# Patient Record
Sex: MALE | Race: WHITE | NOT HISPANIC OR LATINO | URBAN - METROPOLITAN AREA
[De-identification: names, ages, dates, MRNs, and addresses within clinical notes are randomized per-mention and may not be internally consistent; named-entity substitution may affect disease eponyms.]

---

## 2019-11-14 ENCOUNTER — INPATIENT (INPATIENT)
Facility: HOSPITAL | Age: 54
LOS: 6 days | Discharge: NURSING, REHAB OR LONG-TERM CARE | DRG: 66 | End: 2019-11-21
Attending: HOSPITALIST | Admitting: HOSPITALIST
Payer: COMMERCIAL

## 2019-11-14 VITALS
TEMPERATURE: 98 F | RESPIRATION RATE: 16 BRPM | WEIGHT: 184.97 LBS | HEART RATE: 88 BPM | OXYGEN SATURATION: 100 % | SYSTOLIC BLOOD PRESSURE: 158 MMHG | HEIGHT: 66 IN | DIASTOLIC BLOOD PRESSURE: 102 MMHG

## 2019-11-14 DIAGNOSIS — R29.90 UNSPECIFIED SYMPTOMS AND SIGNS INVOLVING THE NERVOUS SYSTEM: ICD-10-CM

## 2019-11-14 DIAGNOSIS — I73.9 PERIPHERAL VASCULAR DISEASE, UNSPECIFIED: ICD-10-CM

## 2019-11-14 DIAGNOSIS — I10 ESSENTIAL (PRIMARY) HYPERTENSION: ICD-10-CM

## 2019-11-14 DIAGNOSIS — Z29.9 ENCOUNTER FOR PROPHYLACTIC MEASURES, UNSPECIFIED: ICD-10-CM

## 2019-11-14 DIAGNOSIS — H66.90 OTITIS MEDIA, UNSPECIFIED, UNSPECIFIED EAR: ICD-10-CM

## 2019-11-14 LAB
ALBUMIN SERPL ELPH-MCNC: 3.5 G/DL — SIGNIFICANT CHANGE UP (ref 3.3–5)
ALP SERPL-CCNC: 63 U/L — SIGNIFICANT CHANGE UP (ref 40–120)
ALT FLD-CCNC: 43 U/L — SIGNIFICANT CHANGE UP (ref 12–78)
ANION GAP SERPL CALC-SCNC: 9 MMOL/L — SIGNIFICANT CHANGE UP (ref 5–17)
AST SERPL-CCNC: 25 U/L — SIGNIFICANT CHANGE UP (ref 15–37)
BASOPHILS # BLD AUTO: 0.02 K/UL — SIGNIFICANT CHANGE UP (ref 0–0.2)
BASOPHILS NFR BLD AUTO: 0.3 % — SIGNIFICANT CHANGE UP (ref 0–2)
BILIRUB SERPL-MCNC: 0.8 MG/DL — SIGNIFICANT CHANGE UP (ref 0.2–1.2)
BUN SERPL-MCNC: 23 MG/DL — SIGNIFICANT CHANGE UP (ref 7–23)
CALCIUM SERPL-MCNC: 8.8 MG/DL — SIGNIFICANT CHANGE UP (ref 8.5–10.1)
CHLORIDE SERPL-SCNC: 108 MMOL/L — SIGNIFICANT CHANGE UP (ref 96–108)
CO2 SERPL-SCNC: 23 MMOL/L — SIGNIFICANT CHANGE UP (ref 22–31)
CREAT SERPL-MCNC: 1.3 MG/DL — SIGNIFICANT CHANGE UP (ref 0.5–1.3)
EOSINOPHIL # BLD AUTO: 0 K/UL — SIGNIFICANT CHANGE UP (ref 0–0.5)
EOSINOPHIL NFR BLD AUTO: 0 % — SIGNIFICANT CHANGE UP (ref 0–6)
GLUCOSE SERPL-MCNC: 267 MG/DL — HIGH (ref 70–99)
HCT VFR BLD CALC: 51.6 % — HIGH (ref 39–50)
HGB BLD-MCNC: 18.4 G/DL — HIGH (ref 13–17)
IMM GRANULOCYTES NFR BLD AUTO: 0.1 % — SIGNIFICANT CHANGE UP (ref 0–1.5)
LIDOCAIN IGE QN: 225 U/L — SIGNIFICANT CHANGE UP (ref 73–393)
LYMPHOCYTES # BLD AUTO: 0.74 K/UL — LOW (ref 1–3.3)
LYMPHOCYTES # BLD AUTO: 9.8 % — LOW (ref 13–44)
MCHC RBC-ENTMCNC: 32.7 PG — SIGNIFICANT CHANGE UP (ref 27–34)
MCHC RBC-ENTMCNC: 35.7 GM/DL — SIGNIFICANT CHANGE UP (ref 32–36)
MCV RBC AUTO: 91.7 FL — SIGNIFICANT CHANGE UP (ref 80–100)
MONOCYTES # BLD AUTO: 0.27 K/UL — SIGNIFICANT CHANGE UP (ref 0–0.9)
MONOCYTES NFR BLD AUTO: 3.6 % — SIGNIFICANT CHANGE UP (ref 2–14)
NEUTROPHILS # BLD AUTO: 6.54 K/UL — SIGNIFICANT CHANGE UP (ref 1.8–7.4)
NEUTROPHILS NFR BLD AUTO: 86.2 % — HIGH (ref 43–77)
NRBC # BLD: 0 /100 WBCS — SIGNIFICANT CHANGE UP (ref 0–0)
PLATELET # BLD AUTO: 214 K/UL — SIGNIFICANT CHANGE UP (ref 150–400)
POTASSIUM SERPL-MCNC: 3.6 MMOL/L — SIGNIFICANT CHANGE UP (ref 3.5–5.3)
POTASSIUM SERPL-SCNC: 3.6 MMOL/L — SIGNIFICANT CHANGE UP (ref 3.5–5.3)
PROT SERPL-MCNC: 7.7 G/DL — SIGNIFICANT CHANGE UP (ref 6–8.3)
RBC # BLD: 5.63 M/UL — SIGNIFICANT CHANGE UP (ref 4.2–5.8)
RBC # FLD: 12 % — SIGNIFICANT CHANGE UP (ref 10.3–14.5)
SODIUM SERPL-SCNC: 140 MMOL/L — SIGNIFICANT CHANGE UP (ref 135–145)
WBC # BLD: 7.58 K/UL — SIGNIFICANT CHANGE UP (ref 3.8–10.5)
WBC # FLD AUTO: 7.58 K/UL — SIGNIFICANT CHANGE UP (ref 3.8–10.5)

## 2019-11-14 PROCEDURE — 70496 CT ANGIOGRAPHY HEAD: CPT | Mod: 26

## 2019-11-14 PROCEDURE — 70498 CT ANGIOGRAPHY NECK: CPT | Mod: 26

## 2019-11-14 PROCEDURE — 99285 EMERGENCY DEPT VISIT HI MDM: CPT

## 2019-11-14 PROCEDURE — 93010 ELECTROCARDIOGRAM REPORT: CPT

## 2019-11-14 PROCEDURE — 99223 1ST HOSP IP/OBS HIGH 75: CPT | Mod: GC

## 2019-11-14 RX ORDER — DIAZEPAM 5 MG
5 TABLET ORAL ONCE
Refills: 0 | Status: DISCONTINUED | OUTPATIENT
Start: 2019-11-14 | End: 2019-11-14

## 2019-11-14 RX ORDER — ASPIRIN/CALCIUM CARB/MAGNESIUM 324 MG
325 TABLET ORAL ONCE
Refills: 0 | Status: COMPLETED | OUTPATIENT
Start: 2019-11-14 | End: 2019-11-14

## 2019-11-14 RX ORDER — MECLIZINE HCL 12.5 MG
25 TABLET ORAL ONCE
Refills: 0 | Status: COMPLETED | OUTPATIENT
Start: 2019-11-14 | End: 2019-11-14

## 2019-11-14 RX ORDER — SODIUM CHLORIDE 9 MG/ML
1000 INJECTION INTRAMUSCULAR; INTRAVENOUS; SUBCUTANEOUS ONCE
Refills: 0 | Status: COMPLETED | OUTPATIENT
Start: 2019-11-14 | End: 2019-11-14

## 2019-11-14 RX ORDER — SODIUM CHLORIDE 9 MG/ML
3 INJECTION INTRAMUSCULAR; INTRAVENOUS; SUBCUTANEOUS ONCE
Refills: 0 | Status: COMPLETED | OUTPATIENT
Start: 2019-11-14 | End: 2019-11-14

## 2019-11-14 RX ORDER — ASPIRIN/CALCIUM CARB/MAGNESIUM 324 MG
325 TABLET ORAL DAILY
Refills: 0 | Status: DISCONTINUED | OUTPATIENT
Start: 2019-11-14 | End: 2019-11-21

## 2019-11-14 RX ORDER — SODIUM CHLORIDE 9 MG/ML
1000 INJECTION INTRAMUSCULAR; INTRAVENOUS; SUBCUTANEOUS
Refills: 0 | Status: DISCONTINUED | OUTPATIENT
Start: 2019-11-14 | End: 2019-11-21

## 2019-11-14 RX ADMIN — Medication 325 MILLIGRAM(S): at 23:02

## 2019-11-14 RX ADMIN — SODIUM CHLORIDE 3 MILLILITER(S): 9 INJECTION INTRAMUSCULAR; INTRAVENOUS; SUBCUTANEOUS at 17:59

## 2019-11-14 RX ADMIN — Medication 25 MILLIGRAM(S): at 17:58

## 2019-11-14 RX ADMIN — Medication 125 MILLIGRAM(S): at 17:58

## 2019-11-14 RX ADMIN — SODIUM CHLORIDE 75 MILLILITER(S): 9 INJECTION INTRAMUSCULAR; INTRAVENOUS; SUBCUTANEOUS at 23:41

## 2019-11-14 RX ADMIN — SODIUM CHLORIDE 1000 MILLILITER(S): 9 INJECTION INTRAMUSCULAR; INTRAVENOUS; SUBCUTANEOUS at 17:59

## 2019-11-14 RX ADMIN — Medication 1 TABLET(S): at 23:02

## 2019-11-14 RX ADMIN — Medication 5 MILLIGRAM(S): at 17:58

## 2019-11-14 NOTE — ED PROVIDER NOTE - CLINICAL SUMMARY MEDICAL DECISION MAKING FREE TEXT BOX
Pt is a 55 yo male who presents to the ED with a cc of dizziness.  PMHx of HTN.  Pt reports that symptoms began on Monday.  He reports that he is experiencing right ear pressure/fullness.  He further reports that he feels dizzy like the room is spinning and when he stands he feels like his gait is unstable.  He states that yesterday he developed nausea with several episodes of vomiting since.  Denies fever, chills, D/C, CP, SOB, abd pain.  Denies ext numbness or weakness.  Pt reports one prior similar episode but he reports that this was self limited and resolved within a short period of time. He was seen at urgent care today and was given one dose of Meclizine.  Symptoms did not improve and so pt was sent to the ED for further work up. Concern for BPV vs CVA.  Will obtain screening labs, CT head and medicate for symptoms.  Pt is not a candidate for TPA as symptoms have been ongoing for days. Dysphagia screen passed at bedside

## 2019-11-14 NOTE — H&P ADULT - NSHPSOCIALHISTORY_GEN_ALL_CORE
Occupation:  at [employer]   Lives with: 2 roommates, rents an apartment    Ambulates without assistance, able to use stairs (prior to episode of dizziness)  Does not have a car - walks to destinations     Substance Use (street drugs): denies  Tobacco Usage:  denies  Alcohol Usage: denies

## 2019-11-14 NOTE — H&P ADULT - ASSESSMENT
54M with PMH of HTN presents with dizziness for 4 days admitted for stroke-like symptoms and vertebral artery stenosis/occlusion.

## 2019-11-14 NOTE — H&P ADULT - NSHPPHYSICALEXAM_GEN_ALL_CORE
Height (cm): 167.64 (11-14 @ 16:46)  Weight (kg): 83.9 (11-14 @ 16:46)  BMI (kg/m2): 29.9 (11-14 @ 16:46)  BSA (m2): 1.93 (11-14 @ 16:46)  Vital Signs Last 24 Hrs  T(C): 36.6 (14 Nov 2019 21:10), Max: 36.7 (14 Nov 2019 16:46)  T(F): 97.8 (14 Nov 2019 21:10), Max: 98.1 (14 Nov 2019 16:46)  HR: 105 (14 Nov 2019 23:44) (83 - 105)  BP: 142/94 (14 Nov 2019 23:44) (142/94 - 158/102)  BP(mean): --  RR: 16 (14 Nov 2019 23:44) (15 - 16)  SpO2: 96% (14 Nov 2019 23:44) (96% - 100%)  [ X] room air   [ ] 02    PHYSICAL EXAM:  GENERAL:  No acute distress  HEAD:  AT/NC  ENMT: EOMI, PERRL, dry mucus membranes, vertical and horizontal bilateral nystagmus noted; ?left upper eyelid droop   NECK:  supple  NERVOUS SYSTEM:  Alert & Oriented X3, CN II-VII grossly intact, vertical and horizontal bilateral nystagmus noted  CHEST/LUNG: Clear to auscultation bilaterally  HEART:  Regular rate and rhythm, No murmurs, rubs, or gallops  ABDOMEN:  soft, nontender, nondistended, positive bowel sounds  EXTREMITIES: No clubbing, cyanosis or edema  SKIN: no venous stasis skin changes Height (cm): 167.64 (11-14 @ 16:46)  Weight (kg): 83.9 (11-14 @ 16:46)  BMI (kg/m2): 29.9 (11-14 @ 16:46)  BSA (m2): 1.93 (11-14 @ 16:46)  Vital Signs Last 24 Hrs  T(C): 36.6 (14 Nov 2019 21:10), Max: 36.7 (14 Nov 2019 16:46)  T(F): 97.8 (14 Nov 2019 21:10), Max: 98.1 (14 Nov 2019 16:46)  HR: 105 (14 Nov 2019 23:44) (83 - 105)  BP: 142/94 (14 Nov 2019 23:44) (142/94 - 158/102)  BP(mean): --  RR: 16 (14 Nov 2019 23:44) (15 - 16)  SpO2: 96% (14 Nov 2019 23:44) (96% - 100%)  [ X] room air   [ ] 02    PHYSICAL EXAM:  GENERAL:  No acute distress  HEAD:  AT/NC  ENMT: EOMI, PERRL, dry mucus membranes, vertical and horizontal bilateral nystagmus noted; ?left upper eyelid droop, R tympanic membrane bulging and red   NECK:  supple  NERVOUS SYSTEM:  Alert & Oriented X3, CN II-VII grossly intact, vertical and horizontal bilateral nystagmus noted  CHEST/LUNG: Clear to auscultation bilaterally  HEART:  Regular rate and rhythm, No murmurs, rubs, or gallops  ABDOMEN:  soft, nontender, nondistended, positive bowel sounds  EXTREMITIES: No clubbing, cyanosis or edema  SKIN: no venous stasis skin changes Height (cm): 167.64 (11-14 @ 16:46)  Weight (kg): 83.9 (11-14 @ 16:46)  BMI (kg/m2): 29.9 (11-14 @ 16:46)  BSA (m2): 1.93 (11-14 @ 16:46)  Vital Signs Last 24 Hrs  T(C): 36.6 (14 Nov 2019 21:10), Max: 36.7 (14 Nov 2019 16:46)  T(F): 97.8 (14 Nov 2019 21:10), Max: 98.1 (14 Nov 2019 16:46)  HR: 105 (14 Nov 2019 23:44) (83 - 105)  BP: 142/94 (14 Nov 2019 23:44) (142/94 - 158/102)  BP(mean): --  RR: 16 (14 Nov 2019 23:44) (15 - 16)  SpO2: 96% (14 Nov 2019 23:44) (96% - 100%)  [ X] room air   [ ] 02    PHYSICAL EXAM:  GENERAL:  No acute distress  HEAD:  AT/NC  ENMT: EOMI, PERRL, dry mucus membranes, vertical and horizontal bilateral nystagmus noted; ?left upper eyelid droop, R tympanic membrane bulging and red   NECK:  supple  NERVOUS SYSTEM:  Alert & Oriented X3, CN II-VII grossly intact, vertical and horizontal bilateral nystagmus noted.    CHEST/LUNG: Clear to auscultation bilaterally  HEART:  Regular rate and rhythm, No murmurs, rubs, or gallops  ABDOMEN:  soft, nontender, nondistended, positive bowel sounds  EXTREMITIES: No clubbing, cyanosis or edema  SKIN: no venous stasis skin changes

## 2019-11-14 NOTE — ED ADULT TRIAGE NOTE - CHIEF COMPLAINT QUOTE
dizziness for the past three days went to urgent care and given a pill one hour ago and dizziness did not go away per urgent care sheet pts gait is unbalanced

## 2019-11-14 NOTE — ED ADULT NURSE REASSESSMENT NOTE - NS ED NURSE REASSESS COMMENT FT1
pt evaluated at bedside by Dr Servin IV inserted blood work drawn and sent to lab as ordered.  IVF initiated medicated as ordered CT scan complete.  awaiting results and dispo. ambulated to bathroom with minimal assistance.

## 2019-11-14 NOTE — H&P ADULT - PROBLEM SELECTOR PLAN 2
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  -management as above

## 2019-11-14 NOTE — ED ADULT NURSE NOTE - CHPI ED NUR SYMPTOMS NEG
no vomiting/no fever/no weakness/no numbness/no blurred vision/no change in level of consciousness/no confusion/no nausea/no loss of consciousness

## 2019-11-14 NOTE — H&P ADULT - PROBLEM SELECTOR PLAN 1
Patient with persistent dizziness for 4days. Out of tPA window  -Admit to tele  -Start IVF 75cc/hr x 12 hours. H/H mildly elevated likely due to hemoconcentration as patient reports vomiting and minimal intake today  -Start ASA 325mg daily  -Follow up MRI head, MRA head and neck  -Follow up hemoglobin A1C, TSH, lipid panel  -Speech and swallow evaluation

## 2019-11-14 NOTE — H&P ADULT - PROBLEM SELECTOR PLAN 3
chronic, stable  -Allow for permissive hypertension, hold home medications metoprolol and amlodipine Patient admits to R ear fullness. R tympanic membrane bulging and red   -s/p 1 dose of Augmentin in the ED. Continue Augmentin BID x 7 days

## 2019-11-14 NOTE — H&P ADULT - PROBLEM SELECTOR PLAN 5
IMPROVE VTE Individual Risk Assessment          RISK                                                          Points  [  ] Previous VTE                                                3  [  ] Thrombophilia                                             2  [  ] Lower limb paralysis                                   2        (unable to hold up >15 seconds)    [  ] Current Cancer                                             2         (within 6 months)  [  ] Immobilization > 24 hrs                              1  [  ] ICU/CCU stay > 24 hours                             1  [  ] Age > 60                                                         1    IMPROVE VTE Score: 0  DVT prophylaxis: SCDs  Fall risk

## 2019-11-14 NOTE — ED PROVIDER NOTE - OBJECTIVE STATEMENT
Pt is a 55 yo male who presents to the ED with a cc of dizziness.  PMHx of HTN.  Pt reports that symptoms began on Monday.  He reports that he is experiencing right ear pressure/fullness.  He further reports that he feels dizzy like the room is spinning and when he stands he feels like his gait is unstable.  He states that yesterday he developed nausea with several episodes of vomiting since.  Denies fever, chills, D/C, CP, SOB, abd pain.  Denies ext numbness or weakness.  Pt reports one prior similar episode but he reports that this was self limited and resolved within a short period of time. He was seen at urgent care today and was given one dose of Meclizine.  Symptoms did not improve and so pt was sent to the ED for further work up.

## 2019-11-14 NOTE — ED ADULT NURSE NOTE - OBJECTIVE STATEMENT
pt c/o feeling dizzy was sent from urgent care for persistent dizziness and "unsteady gait" pt c/o feeling dizzy was sent from urgent care for persistent dizziness and "unsteady gait"  left eyelid is "droopy" when asked pt is unsure if he always has a droopy left lid.

## 2019-11-14 NOTE — H&P ADULT - HISTORY OF PRESENT ILLNESS
54M with PMH of HTN presents with dizziness for 4 days. States he feels like the room is spinning and has an unsteady gait.  Admits to right ear pressure. Patient went to Urgent Care today and was given Meclizine.     In the ED: Temp 98.1, HR 88, /102, RR 16, SpO2 100% RA.   H/H: 18.4/51.6, glucose 267.   CT head: No acute hemorrhage or mass effect.  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  NIH Stroke Scale: 0, EKG: NSR 94 54M with PMH of HTN presents with dizziness for 4 days. States he feels like the room is spinning and has an unsteady gait. Reports intermittent dizziness for a 2 days but progressed to constant dizziness, he was unable to stand without holding unto objects near by. Patient walks to the [facility] for work daily and couldn't walk without reaching and leaning on trees. States similar brief episode of dizziness last week but was able to go to work and symptoms resolved after a minute. Patient works as a  in [employer], skins and cuts fish, also does heavy loading. States diet consists of "eat on the go take out," including [employer], bagels, soda, ice tea, deli food and chinese food. Admits to two episodes of non-bloody, non-bilious emesis and was unable to keep food down today, prompting urgent care evaluation. Patient was given Meclizine at the urgent care and was advised ED eval if symptoms persisted. Denies chest pain, palpitations, SOB, FRENCH, abd pain, c/d. Denies ear pain, sore throat. States his form of exercise is walking, as patient does not have a car.     In the ED: Temp 98.1, HR 88, /102, RR 16, SpO2 100% RA.   H/H: 18.4/51.6, glucose 267.   CT head: No acute hemorrhage or mass effect.  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  NIH Stroke Scale: 0, EKG: NSR 94  Received ASA 325mg, Augmentin x1, 1L NS bolus, Valium 5mg x1, Meclizine 25mg x1, Solu-Medrol 125mg IV x1.

## 2019-11-14 NOTE — H&P ADULT - NSHPREVIEWOFSYSTEMS_GEN_ALL_CORE
CONSTITUTIONAL: No fever, No chills  EYES: No eye pain, visual disturbances, or discharge  ENMT:  No ear pain; No sinus or throat pain  NECK: No pain, No stiffness  RESPIRATORY: No cough, wheezing, No hemoptysis; No shortness of breath  CARDIOVASCULAR: No chest pain, palpitations, leg swelling  GASTROINTESTINAL: No abdominal or epigastric pain. positive nausea, positive vomiting  GENITOURINARY: No dysuria, No frequency, No urgency, No hematuria, or incontinence  NEUROLOGICAL: alert and oriented x 3,  No headaches, positive dizziness, No numbness  SKIN:   No itching, burning, rashes, or lesions   MUSCULOSKELETAL: No joint pain or swelling; No muscle pain, No back pain, No extremity pain  PSYCHIATRIC: No depression, anxiety, mood swings, or difficulty sleeping CONSTITUTIONAL: No fever, No chills  EYES: No eye pain, visual disturbances, or discharge  ENMT:  Right ear feels full, but No ear pain; No sinus or throat pain  NECK: No pain, No stiffness  RESPIRATORY: No cough, wheezing, No hemoptysis; No shortness of breath  CARDIOVASCULAR: No chest pain, palpitations, leg swelling  GASTROINTESTINAL: No abdominal or epigastric pain. positive nausea, positive vomiting  GENITOURINARY: No dysuria, No frequency, No urgency, No hematuria, or incontinence  NEUROLOGICAL: alert and oriented x 3,  No headaches, positive dizziness, No numbness  SKIN:   No itching, burning, rashes, or lesions   MUSCULOSKELETAL: No joint pain or swelling; No muscle pain, No back pain, No extremity pain  PSYCHIATRIC: No depression, anxiety, mood swings, or difficulty sleeping

## 2019-11-14 NOTE — PATIENT PROFILE ADULT - VISION (WITH CORRECTIVE LENSES IF THE PATIENT USUALLY WEARS THEM):
Normal vision: sees adequately in most situations; can see medication labels, newsprint/wears contact lenses

## 2019-11-14 NOTE — ED PROVIDER NOTE - PROGRESS NOTE DETAILS
pt is not a TPA candidate as symptoms have been ongoing for days.  Dysphagia screen passed.  Results of CTA noted questionable occlusion vs stenosis of vertebral artery.  Pt has improvement in symptoms but still reporting unsteady gait.  Will admit for MRI and neurology follow up. ASA given

## 2019-11-14 NOTE — H&P ADULT - PROBLEM SELECTOR PLAN 4
IMPROVE VTE Individual Risk Assessment          RISK                                                          Points  [  ] Previous VTE                                                3  [  ] Thrombophilia                                             2  [  ] Lower limb paralysis                                   2        (unable to hold up >15 seconds)    [  ] Current Cancer                                             2         (within 6 months)  [  ] Immobilization > 24 hrs                              1  [  ] ICU/CCU stay > 24 hours                             1  [  ] Age > 60                                                         1    IMPROVE VTE Score: 0  DVT prophylaxis: SCDs  Fall risk chronic, stable  -Allow for permissive hypertension, hold home medications metoprolol and amlodipine

## 2019-11-15 DIAGNOSIS — R42 DIZZINESS AND GIDDINESS: ICD-10-CM

## 2019-11-15 DIAGNOSIS — E11.9 TYPE 2 DIABETES MELLITUS WITHOUT COMPLICATIONS: ICD-10-CM

## 2019-11-15 LAB
ALBUMIN SERPL ELPH-MCNC: 3.5 G/DL — SIGNIFICANT CHANGE UP (ref 3.3–5)
ALP SERPL-CCNC: 65 U/L — SIGNIFICANT CHANGE UP (ref 40–120)
ALT FLD-CCNC: 38 U/L — SIGNIFICANT CHANGE UP (ref 12–78)
ANION GAP SERPL CALC-SCNC: 11 MMOL/L — SIGNIFICANT CHANGE UP (ref 5–17)
AST SERPL-CCNC: 20 U/L — SIGNIFICANT CHANGE UP (ref 15–37)
BASOPHILS # BLD AUTO: 0.01 K/UL — SIGNIFICANT CHANGE UP (ref 0–0.2)
BASOPHILS NFR BLD AUTO: 0.1 % — SIGNIFICANT CHANGE UP (ref 0–2)
BILIRUB SERPL-MCNC: 0.9 MG/DL — SIGNIFICANT CHANGE UP (ref 0.2–1.2)
BUN SERPL-MCNC: 26 MG/DL — HIGH (ref 7–23)
CALCIUM SERPL-MCNC: 8.8 MG/DL — SIGNIFICANT CHANGE UP (ref 8.5–10.1)
CHLORIDE SERPL-SCNC: 108 MMOL/L — SIGNIFICANT CHANGE UP (ref 96–108)
CHOLEST SERPL-MCNC: 310 MG/DL — HIGH (ref 10–199)
CO2 SERPL-SCNC: 22 MMOL/L — SIGNIFICANT CHANGE UP (ref 22–31)
CREAT SERPL-MCNC: 1.4 MG/DL — HIGH (ref 0.5–1.3)
EOSINOPHIL # BLD AUTO: 0 K/UL — SIGNIFICANT CHANGE UP (ref 0–0.5)
EOSINOPHIL NFR BLD AUTO: 0 % — SIGNIFICANT CHANGE UP (ref 0–6)
GLUCOSE SERPL-MCNC: 304 MG/DL — HIGH (ref 70–99)
HBA1C BLD-MCNC: 9.5 % — HIGH (ref 4–5.6)
HCT VFR BLD CALC: 49.7 % — SIGNIFICANT CHANGE UP (ref 39–50)
HCV AB S/CO SERPL IA: 0.12 S/CO — SIGNIFICANT CHANGE UP (ref 0–0.99)
HCV AB SERPL-IMP: SIGNIFICANT CHANGE UP
HDLC SERPL-MCNC: 49 MG/DL — SIGNIFICANT CHANGE UP
HGB BLD-MCNC: 17.6 G/DL — HIGH (ref 13–17)
IMM GRANULOCYTES NFR BLD AUTO: 0.5 % — SIGNIFICANT CHANGE UP (ref 0–1.5)
LIPID PNL WITH DIRECT LDL SERPL: 239 MG/DL — HIGH
LYMPHOCYTES # BLD AUTO: 0.65 K/UL — LOW (ref 1–3.3)
LYMPHOCYTES # BLD AUTO: 7.1 % — LOW (ref 13–44)
MCHC RBC-ENTMCNC: 32.4 PG — SIGNIFICANT CHANGE UP (ref 27–34)
MCHC RBC-ENTMCNC: 35.4 GM/DL — SIGNIFICANT CHANGE UP (ref 32–36)
MCV RBC AUTO: 91.5 FL — SIGNIFICANT CHANGE UP (ref 80–100)
MONOCYTES # BLD AUTO: 0.05 K/UL — SIGNIFICANT CHANGE UP (ref 0–0.9)
MONOCYTES NFR BLD AUTO: 0.5 % — LOW (ref 2–14)
NEUTROPHILS # BLD AUTO: 8.35 K/UL — HIGH (ref 1.8–7.4)
NEUTROPHILS NFR BLD AUTO: 91.8 % — HIGH (ref 43–77)
NRBC # BLD: 0 /100 WBCS — SIGNIFICANT CHANGE UP (ref 0–0)
PLATELET # BLD AUTO: 222 K/UL — SIGNIFICANT CHANGE UP (ref 150–400)
POTASSIUM SERPL-MCNC: 3.5 MMOL/L — SIGNIFICANT CHANGE UP (ref 3.5–5.3)
POTASSIUM SERPL-SCNC: 3.5 MMOL/L — SIGNIFICANT CHANGE UP (ref 3.5–5.3)
PROT SERPL-MCNC: 7.7 G/DL — SIGNIFICANT CHANGE UP (ref 6–8.3)
RBC # BLD: 5.43 M/UL — SIGNIFICANT CHANGE UP (ref 4.2–5.8)
RBC # FLD: 12 % — SIGNIFICANT CHANGE UP (ref 10.3–14.5)
SODIUM SERPL-SCNC: 141 MMOL/L — SIGNIFICANT CHANGE UP (ref 135–145)
T3 SERPL-MCNC: 62 NG/DL — LOW (ref 80–200)
T4 AB SER-ACNC: 7 UG/DL — SIGNIFICANT CHANGE UP (ref 4.6–12)
TOTAL CHOLESTEROL/HDL RATIO MEASUREMENT: 6.3 RATIO — SIGNIFICANT CHANGE UP (ref 3.4–9.6)
TRIGL SERPL-MCNC: 109 MG/DL — SIGNIFICANT CHANGE UP (ref 10–149)
TSH SERPL-MCNC: 0.64 UIU/ML — SIGNIFICANT CHANGE UP (ref 0.36–3.74)
WBC # BLD: 9.11 K/UL — SIGNIFICANT CHANGE UP (ref 3.8–10.5)
WBC # FLD AUTO: 9.11 K/UL — SIGNIFICANT CHANGE UP (ref 3.8–10.5)

## 2019-11-15 PROCEDURE — 70544 MR ANGIOGRAPHY HEAD W/O DYE: CPT | Mod: 26,59

## 2019-11-15 PROCEDURE — 99233 SBSQ HOSP IP/OBS HIGH 50: CPT | Mod: GC

## 2019-11-15 PROCEDURE — 70549 MR ANGIOGRAPH NECK W/O&W/DYE: CPT | Mod: 26

## 2019-11-15 PROCEDURE — 70553 MRI BRAIN STEM W/O & W/DYE: CPT | Mod: 26

## 2019-11-15 RX ORDER — CARBAMIDE PEROXIDE 81.86 MG/ML
1 SOLUTION/ DROPS AURICULAR (OTIC)
Refills: 0 | Status: DISCONTINUED | OUTPATIENT
Start: 2019-11-15 | End: 2019-11-17

## 2019-11-15 RX ORDER — DEXTROSE 50 % IN WATER 50 %
25 SYRINGE (ML) INTRAVENOUS ONCE
Refills: 0 | Status: DISCONTINUED | OUTPATIENT
Start: 2019-11-15 | End: 2019-11-21

## 2019-11-15 RX ORDER — LISINOPRIL 2.5 MG/1
2.5 TABLET ORAL DAILY
Refills: 0 | Status: DISCONTINUED | OUTPATIENT
Start: 2019-11-15 | End: 2019-11-18

## 2019-11-15 RX ORDER — ATORVASTATIN CALCIUM 80 MG/1
40 TABLET, FILM COATED ORAL AT BEDTIME
Refills: 0 | Status: DISCONTINUED | OUTPATIENT
Start: 2019-11-15 | End: 2019-11-15

## 2019-11-15 RX ORDER — INSULIN LISPRO 100/ML
VIAL (ML) SUBCUTANEOUS AT BEDTIME
Refills: 0 | Status: DISCONTINUED | OUTPATIENT
Start: 2019-11-15 | End: 2019-11-21

## 2019-11-15 RX ORDER — DEXTROSE 50 % IN WATER 50 %
12.5 SYRINGE (ML) INTRAVENOUS ONCE
Refills: 0 | Status: DISCONTINUED | OUTPATIENT
Start: 2019-11-15 | End: 2019-11-21

## 2019-11-15 RX ORDER — INSULIN LISPRO 100/ML
VIAL (ML) SUBCUTANEOUS
Refills: 0 | Status: DISCONTINUED | OUTPATIENT
Start: 2019-11-15 | End: 2019-11-15

## 2019-11-15 RX ORDER — SODIUM CHLORIDE 9 MG/ML
1000 INJECTION, SOLUTION INTRAVENOUS
Refills: 0 | Status: DISCONTINUED | OUTPATIENT
Start: 2019-11-15 | End: 2019-11-21

## 2019-11-15 RX ORDER — GLUCAGON INJECTION, SOLUTION 0.5 MG/.1ML
1 INJECTION, SOLUTION SUBCUTANEOUS ONCE
Refills: 0 | Status: DISCONTINUED | OUTPATIENT
Start: 2019-11-15 | End: 2019-11-21

## 2019-11-15 RX ORDER — INSULIN LISPRO 100/ML
VIAL (ML) SUBCUTANEOUS
Refills: 0 | Status: DISCONTINUED | OUTPATIENT
Start: 2019-11-15 | End: 2019-11-21

## 2019-11-15 RX ORDER — ATORVASTATIN CALCIUM 80 MG/1
80 TABLET, FILM COATED ORAL AT BEDTIME
Refills: 0 | Status: DISCONTINUED | OUTPATIENT
Start: 2019-11-15 | End: 2019-11-21

## 2019-11-15 RX ORDER — INSULIN LISPRO 100/ML
3 VIAL (ML) SUBCUTANEOUS
Refills: 0 | Status: DISCONTINUED | OUTPATIENT
Start: 2019-11-15 | End: 2019-11-17

## 2019-11-15 RX ORDER — LISINOPRIL 2.5 MG/1
2.5 TABLET ORAL DAILY
Refills: 0 | Status: DISCONTINUED | OUTPATIENT
Start: 2019-11-15 | End: 2019-11-15

## 2019-11-15 RX ORDER — MECLIZINE HCL 12.5 MG
25 TABLET ORAL EVERY 8 HOURS
Refills: 0 | Status: DISCONTINUED | OUTPATIENT
Start: 2019-11-15 | End: 2019-11-17

## 2019-11-15 RX ORDER — DEXTROSE 50 % IN WATER 50 %
15 SYRINGE (ML) INTRAVENOUS ONCE
Refills: 0 | Status: DISCONTINUED | OUTPATIENT
Start: 2019-11-15 | End: 2019-11-21

## 2019-11-15 RX ADMIN — Medication 6: at 17:09

## 2019-11-15 RX ADMIN — LISINOPRIL 2.5 MILLIGRAM(S): 2.5 TABLET ORAL at 23:45

## 2019-11-15 RX ADMIN — Medication 25 MILLIGRAM(S): at 17:44

## 2019-11-15 RX ADMIN — CARBAMIDE PEROXIDE 1 DROP(S): 81.86 SOLUTION/ DROPS AURICULAR (OTIC) at 12:53

## 2019-11-15 RX ADMIN — Medication 1 TABLET(S): at 11:04

## 2019-11-15 RX ADMIN — Medication 25 MILLIGRAM(S): at 23:44

## 2019-11-15 RX ADMIN — Medication 4: at 12:49

## 2019-11-15 RX ADMIN — CARBAMIDE PEROXIDE 1 DROP(S): 81.86 SOLUTION/ DROPS AURICULAR (OTIC) at 17:45

## 2019-11-15 RX ADMIN — Medication 1 TABLET(S): at 23:45

## 2019-11-15 RX ADMIN — ATORVASTATIN CALCIUM 80 MILLIGRAM(S): 80 TABLET, FILM COATED ORAL at 23:45

## 2019-11-15 RX ADMIN — Medication 325 MILLIGRAM(S): at 12:49

## 2019-11-15 NOTE — PHYSICAL THERAPY INITIAL EVALUATION ADULT - PATIENT/FAMILY/SIGNIFICANT OTHER GOALS STATEMENT, PT EVAL
Per MBSAQIP requirements; Phone call placed. Left message for patient to call for follow up appointment.
"To feel better"

## 2019-11-15 NOTE — SWALLOW BEDSIDE ASSESSMENT ADULT - SWALLOW EVAL: DIAGNOSIS
Pt was given PO trials of puree, regular solids, and thin liquids via single/consecutive cup sips, self-administered. Pt p/w 1. Functional oral phase marked by adequate bolus retrieval and containment, timely mastication/manipulation of bolus and transfer, and adequate oral clearance post swallow. 2. Functional pharyngeal phase marked by suspected timely swallow onset, +laryngeal elevation to palpation, and no overt s/sx of aspiration. 3. Recommend PO diet initiation of regular solids with thin liquids.

## 2019-11-15 NOTE — PHYSICAL THERAPY INITIAL EVALUATION ADULT - PERTINENT HX OF CURRENT PROBLEM, REHAB EVAL
Pt admitted 11/14 after a 4 day h/o dizziness. Pt states the room is spinning and has had unsteady gait requiring him to lean on things to walk. MR (-) for acute pathology. Pt is suspected to have a right ear infection.

## 2019-11-15 NOTE — SWALLOW BEDSIDE ASSESSMENT ADULT - COMMENTS
Upon arrival, pt sleeping in bed, easily arousable to voice. Pt maintained adequate alertness for remainder of session, followed commands consistently. Pt's vocal quality/intensity deemed WFL. Pt's speech production was fluent with precise contact of labio-lingual articulators.    CT brain: "No acute hemorrhage or mass effect."  MR brain pending.  CXR has not been administered this admission. Pt's WBC is WFL, no fever.

## 2019-11-15 NOTE — DISCHARGE NOTE NURSING/CASE MANAGEMENT/SOCIAL WORK - NSDCPEPTSTRK_GEN_ALL_CORE
Stroke warning signs and symptoms/Signs and symptoms of stroke/Call 911 for stroke/Need for follow up after discharge/Risk factors for stroke/Stroke education booklet/Stroke support groups for patients, families, and friends/Prescribed medications

## 2019-11-15 NOTE — PROGRESS NOTE ADULT - PROBLEM SELECTOR PLAN 2
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  -management as above CTA head and neck: Diffusely hypoplastic right vertebral artery with calcifications, irregularity and intermittent enhancement of the right V4 segment, suspicious for stenosis and/or occlusion. MRI not showing acute disease, likely chronic complete stenosis.   -management as above CTA head and neck: Diffusely hypoplastic right vertebral artery with calcifications, irregularity and intermittent enhancement of the right V4 segment, suspicious for stenosis and/or occlusion. MRI not showing acute disease, likely chronic complete stenosis.

## 2019-11-15 NOTE — PROGRESS NOTE ADULT - PROBLEM SELECTOR PLAN 1
Patient with persistent dizziness for 4days. Out of tPA window  -Admit to tele  -Start IVF 75cc/hr x 12 hours. H/H mildly elevated likely due to hemoconcentration as patient reports vomiting and minimal intake today  -Start ASA 325mg daily  -Follow up MRI head, MRA head and neck  -Follow up hemoglobin A1C, TSH, lipid panel  -Speech and swallow evaluation Patient with persistent dizziness for 4days. Out of tPA window  -Admit to tele  -IVF started 75cc/hr x 12 hours. H/H mildly elevated likely due to hemoconcentration as patient reports vomiting and minimal intake today  -Start ASA 325mg daily  -MRA head and neck shows vertebral artery calcifications and possible stenosis  -Hemoglobin A1C- 9.5  -TSH- 0.64  -Cholesterol- 310  -LDL- 239  -HDL- 49  -Speech and swallow evaluation- recommended regular solids with thin liquids Patient with persistent dizziness for 4days. Out of tPA window  -Admit to tele r/o CVA  -IVF started 75cc/hr x 12 hours. H/H mildly elevated likely due to hemoconcentration as patient reports vomiting and minimal intake today  -Start ASA 325mg daily  -MRA head and neck shows vertebral artery calcifications and near complete stenosis  -MRI showing no acute disease, near complete occlusion likely congenital or chronic  -Hemoglobin A1C- 9.5  -TSH- 0.64  -Cholesterol- 310, LDL- 239, HDL- 49  -Speech and swallow evaluation- recommended regular solids with thin liquids Patient with persistent dizziness for 4days- antivert   -ruled out cva  and tia .   -IVF started 75cc/hr x 12 hours. H/H mildly elevated likely due to hemoconcentration as patient reports vomiting and minimal intake today  -Start ASA 325mg daily  -MRA head and neck shows vertebral artery calcifications and near complete stenosis  -MRI showing no acute disease, near complete occlusion likely congenital or chronic  -Hemoglobin A1C- 9.5  -TSH- 0.64  -Cholesterol- 310, LDL- 239, HDL- 49  -Speech and swallow evaluation- recommended regular solids with thin liquids

## 2019-11-15 NOTE — PROGRESS NOTE ADULT - SUBJECTIVE AND OBJECTIVE BOX
Patient is a 54y old  Male who presents with a chief complaint of possible stroke (14 Nov 2019 22:53)      HPI:  54M with PMH of HTN presents with dizziness for 4 days. States he feels like the room is spinning and has an unsteady gait. Reports intermittent dizziness for a 2 days but progressed to constant dizziness, he was unable to stand without holding unto objects near by. Patient walks to the [facility] for work daily and couldn't walk without reaching and leaning on trees. States similar brief episode of dizziness last week but was able to go to work and symptoms resolved after a minute. Patient works as a  in [employer], skins and cuts fish, also does heavy loading. States diet consists of "eat on the go take out," including 7/11, bagels, soda, ice tea, deli food and chinese food. Admits to two episodes of non-bloody, non-bilious emesis and was unable to keep food down today, prompting urgent care evaluation. Patient was given Meclizine at the urgent care and was advised ED eval if symptoms persisted. Denies chest pain, palpitations, SOB, FRENCH, abd pain, c/d. Denies ear pain, sore throat. States his form of exercise is walking, as patient does not have a car.     In the ED: Temp 98.1, HR 88, /102, RR 16, SpO2 100% RA.   H/H: 18.4/51.6, glucose 267.   CT head: No acute hemorrhage or mass effect.  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  NIH Stroke Scale: 0, EKG: NSR 94  Received ASA 325mg, Augmentin x1, 1L NS bolus, Valium 5mg x1, Meclizine 25mg x1, Solu-Medrol 125mg IV x1. (14 Nov 2019 22:53)      INTERVAL HPI/OVERNIGHT EVENTS:  T(C): 36.9 (11-15-19 @ 07:46), Max: 37 (11-15-19 @ 04:53)  HR: 101 (11-15-19 @ 10:36) (83 - 105)  BP: 154/105 (11-15-19 @ 10:36) (138/93 - 158/102)  RR: 18 (11-15-19 @ 07:46) (15 - 18)  SpO2: 97% (11-15-19 @ 10:36) (92% - 100%)  Wt(kg): --  I&O's Summary    14 Nov 2019 07:01  -  15 Nov 2019 07:00  --------------------------------------------------------  IN: 525 mL / OUT: 0 mL / NET: 525 mL        REVIEW OF SYSTEMS:  CONSTITUTIONAL: No fever, weight loss, or fatigue  EYES: No eye pain, visual disturbances, or discharge  ENMT:  No difficulty hearing, tinnitus, vertigo; No sinus or throat pain  NECK: No pain or stiffness  BREASTS: No pain, no masses,   RESPIRATORY: No cough, wheezing, chills or hemoptysis; No shortness of breath  CARDIOVASCULAR: No chest pain, palpitations, dizziness, or leg swelling  GASTROINTESTINAL: No abdominal or epigastric pain. No nausea, vomiting, or hematemesis; No diarrhea or constipation. No melena or hematochezia.  GENITOURINARY: No dysuria, frequency, hematuria, or incontinence  NEUROLOGICAL: No headaches, memory loss, loss of strength, numbness, or tremors  SKIN: No itching, burning, rashes, or lesions   LYMPH NODES: No enlarged glands  MUSCULOSKELETAL: No joint pain or swelling; No muscle, back, or extremity pain  PSYCHIATRIC: No depression, anxiety, mood swings, or difficulty sleeping  ALLERY No hives or eczema    PHYSICAL EXAM:  GENERAL: NAD, well-groomed, well-developed  HEAD:  Atraumatic, Normocephalic  EYES: EOMI, PERRLA, conjunctiva and sclera clear  ENMT: No tonsillar erythema, exudates, or enlargement; Moist mucous membranes, Good dentition, No lesions  NECK: Supple, No JVD, Normal thyroid  NERVOUS SYSTEM:  Alert & Oriented X3, Good concentration; Motor Strength 5/5 B/L upper and lower extremities; DTRs 2+ intact and symmetric  CHEST/LUNG: Clear to percussion bilaterally; No rales, rhonchi, wheezing, or rubs  HEART: Regular rate and rhythm; No murmurs, rubs, or gallops  ABDOMEN: Soft, Nontender, Nondistended; Bowel sounds present  EXTREMITIES:  2+ Peripheral Pulses, No clubbing, cyanosis, or edema  LYMPH: No lymphadenopathy noted  SKIN: No rashes or lesions    MEDICATIONS  (STANDING):  amoxicillin  875 milliGRAM(s)/clavulanate 1 Tablet(s) Oral two times a day  aspirin 325 milliGRAM(s) Oral daily  atorvastatin 80 milliGRAM(s) Oral at bedtime  carbamide peroxide Otic Solution 1 Drop(s) Left Ear two times a day  dextrose 5%. 1000 milliLiter(s) (50 mL/Hr) IV Continuous <Continuous>  dextrose 50% Injectable 12.5 Gram(s) IV Push once  dextrose 50% Injectable 25 Gram(s) IV Push once  dextrose 50% Injectable 25 Gram(s) IV Push once  insulin lispro (HumaLOG) corrective regimen sliding scale   SubCutaneous Before meals and at bedtime  sodium chloride 0.9%. 1000 milliLiter(s) (75 mL/Hr) IV Continuous <Continuous>    MEDICATIONS  (PRN):  dextrose 40% Gel 15 Gram(s) Oral once PRN Blood Glucose LESS THAN 70 milliGRAM(s)/deciliter  glucagon  Injectable 1 milliGRAM(s) IntraMuscular once PRN Glucose LESS THAN 70 milligrams/deciliter      LABS:                        17.6   9.11  )-----------( 222      ( 15 Nov 2019 04:43 )             49.7     11-15    141  |  108  |  26<H>  ----------------------------<  304<H>  3.5   |  22  |  1.40<H>    Ca    8.8      15 Nov 2019 05:00    TPro  7.7  /  Alb  3.5  /  TBili  0.9  /  DBili  x   /  AST  20  /  ALT  38  /  AlkPhos  65  11-15        CAPILLARY BLOOD GLUCOSE                  RADIOLOGY & ADDITIONAL TESTS:    Imaging Personally Reviewed:       Advance Directives:      Palliative Care: Patient is a 54y old  Male who presents with a chief complaint of possible stroke. Pt was admitted for 4 days of dizziness, during which there was intermittent dizziness for two days and constant dizziness for the next two days. Of note, the pt also has right ear fullness and experienced two episodes of NBNB emesis. No acute events overnight. Pt was seen and examined at bedside. Pt still reports feeling dizzy. Was able to walk through the hallway with assistance. Pt also complains of his right ear feeling full, however, on exam his left ear was impacted with wax. Otherwise, pt denies SOB, tachycardia, abdominal pain, constipation, and diarrhea.  (14 Nov 2019 22:53)      HPI:  54M with PMH of HTN presents with dizziness for 4 days. States he feels like the room is spinning and has an unsteady gait. Reports intermittent dizziness for a 2 days but progressed to constant dizziness, he was unable to stand without holding unto objects near by. Patient walks to the [facility] for work daily and couldn't walk without reaching and leaning on trees. States similar brief episode of dizziness last week but was able to go to work and symptoms resolved after a minute. Patient works as a  in [employer], skins and cuts fish, also does heavy loading. States diet consists of "eat on the go take out," including 7/11, bagels, soda, ice tea, deli food and chinese food. Admits to two episodes of non-bloody, non-bilious emesis and was unable to keep food down today, prompting urgent care evaluation. Patient was given Meclizine at the urgent care and was advised ED eval if symptoms persisted. Denies chest pain, palpitations, SOB, FRENCH, abd pain, c/d. Denies ear pain, sore throat. States his form of exercise is walking, as patient does not have a car.     In the ED: Temp 98.1, HR 88, /102, RR 16, SpO2 100% RA.   H/H: 18.4/51.6, glucose 267.   CT head: No acute hemorrhage or mass effect.  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  NIH Stroke Scale: 0, EKG: NSR 94  Received ASA 325mg, Augmentin x1, 1L NS bolus, Valium 5mg x1, Meclizine 25mg x1, Solu-Medrol 125mg IV x1. (14 Nov 2019 22:53)      INTERVAL HPI/OVERNIGHT EVENTS:  T(C): 36.9 (11-15-19 @ 07:46), Max: 37 (11-15-19 @ 04:53)  HR: 101 (11-15-19 @ 10:36) (83 - 105)  BP: 154/105 (11-15-19 @ 10:36) (138/93 - 158/102)  RR: 18 (11-15-19 @ 07:46) (15 - 18)  SpO2: 97% (11-15-19 @ 10:36) (92% - 100%)  Wt(kg): --  I&O's Summary    14 Nov 2019 07:01  -  15 Nov 2019 07:00  --------------------------------------------------------  IN: 525 mL / OUT: 0 mL / NET: 525 mL        REVIEW OF SYSTEMS:  CONSTITUTIONAL: + dizziness. No fever, weight loss, or fatigue  EYES: No eye pain, visual disturbances, or discharge  ENMT:  +ear fullness, No difficulty hearing, tinnitus, vertigo; No sinus or throat pain  NECK: No pain or stiffness  BREASTS: No pain, no masses,   RESPIRATORY: No cough, wheezing, chills or hemoptysis; No shortness of breath  CARDIOVASCULAR: No chest pain, palpitations, dizziness, or leg swelling  GASTROINTESTINAL: No abdominal or epigastric pain. No nausea, vomiting, or hematemesis; No diarrhea or constipation. No melena or hematochezia.  GENITOURINARY: No dysuria, frequency, hematuria, or incontinence  NEUROLOGICAL: No headaches, memory loss, loss of strength, numbness, or tremors  SKIN: No itching, burning, rashes, or lesions   LYMPH NODES: No enlarged glands  MUSCULOSKELETAL: No joint pain or swelling; No muscle, back, or extremity pain  PSYCHIATRIC: No depression, anxiety, mood swings, or difficulty sleeping  ALLERY No hives or eczema    PHYSICAL EXAM:  GENERAL: NAD, well-groomed, well-developed  HEAD:  Atraumatic, Normocephalic  EYES: EOMI, PERRLA, conjunctiva and sclera clear  ENMT: +wax impaction in left ear. No tonsillar erythema, exudates, or enlargement; Moist mucous membranes, Good dentition, No lesions  NECK: Supple, No JVD, Normal thyroid  NERVOUS SYSTEM:  Alert & Oriented X3, Good concentration; Motor Strength 5/5 B/L upper and lower extremities; DTRs 2+ intact and symmetric  CHEST/LUNG: Clear to percussion bilaterally; No rales, rhonchi, wheezing, or rubs  HEART: Regular rate and rhythm; No murmurs, rubs, or gallops  ABDOMEN: Soft, Nontender, Nondistended; Bowel sounds present  EXTREMITIES:  2+ Peripheral Pulses, No clubbing, cyanosis, or edema  LYMPH: No lymphadenopathy noted  SKIN: No rashes or lesions    MEDICATIONS  (STANDING):  amoxicillin  875 milliGRAM(s)/clavulanate 1 Tablet(s) Oral two times a day  aspirin 325 milliGRAM(s) Oral daily  atorvastatin 80 milliGRAM(s) Oral at bedtime  carbamide peroxide Otic Solution 1 Drop(s) Left Ear two times a day  dextrose 5%. 1000 milliLiter(s) (50 mL/Hr) IV Continuous <Continuous>  dextrose 50% Injectable 12.5 Gram(s) IV Push once  dextrose 50% Injectable 25 Gram(s) IV Push once  dextrose 50% Injectable 25 Gram(s) IV Push once  insulin lispro (HumaLOG) corrective regimen sliding scale   SubCutaneous Before meals and at bedtime  sodium chloride 0.9%. 1000 milliLiter(s) (75 mL/Hr) IV Continuous <Continuous>    MEDICATIONS  (PRN):  dextrose 40% Gel 15 Gram(s) Oral once PRN Blood Glucose LESS THAN 70 milliGRAM(s)/deciliter  glucagon  Injectable 1 milliGRAM(s) IntraMuscular once PRN Glucose LESS THAN 70 milligrams/deciliter      LABS:                        17.6   9.11  )-----------( 222      ( 15 Nov 2019 04:43 )             49.7     11-15    141  |  108  |  26<H>  ----------------------------<  304<H>  3.5   |  22  |  1.40<H>    Ca    8.8      15 Nov 2019 05:00    TPro  7.7  /  Alb  3.5  /  TBili  0.9  /  DBili  x   /  AST  20  /  ALT  38  /  AlkPhos  65  11-15        CAPILLARY BLOOD GLUCOSE                  RADIOLOGY & ADDITIONAL TESTS:    Imaging Personally Reviewed:     < from: CT Angio Neck w/ IV Cont (11.14.19 @ 21:14) >  IMPRESSION:   CT head: No acute hemorrhage or mass effect.    CTA head and neck: Diffusely hypoplastic right vertebral artery with   calcifications, irregularity and intermittent enhancement of the right V4   segment, suspicious for stenosis and/or occlusion.    Consider further evaluation with brain MRI if clinically warranted and if   there are no contraindications. Patient is a 54y old  Male who presents with a chief complaint of possible stroke. 14 Nov 2019 22:53)      HPI:  54M with PMH of HTN presents with dizziness for 4 days. States he feels like the room is spinning and has an unsteady gait. Reports intermittent dizziness for a 2 days but progressed to constant dizziness, he was unable to stand without holding unto objects near by. Patient [facility] the [facility] daily and couldn't walk without reaching and leaning on trees. States similar brief episode of dizziness last week but was able to go to work and symptoms resolved after a minute. Patient works as a  in [employer], skins and cuts fish, also does heavy loading. States diet consists of "eat on the go take out," including 7/11, bagels, soda, ice tea, deli food and chinese food. Admits to two episodes of non-bloody, non-bilious emesis and was unable to keep food down today, prompting urgent care evaluation. Patient was given Meclizine at the urgent care and was advised ED eval if symptoms persisted. Denies chest pain, palpitations, SOB, FRENCH, abd pain, c/d. Denies ear pain, sore throat. States his form of exercise is walking, as patient does not have a car.     In the ED: Temp 98.1, HR 88, /102, RR 16, SpO2 100% RA.   H/H: 18.4/51.6, glucose 267.   CT head: No acute hemorrhage or mass effect.  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  NIH Stroke Scale: 0, EKG: NSR 94  Received ASA 325mg, Augmentin x1, 1L NS bolus, Valium 5mg x1, Meclizine 25mg x1, Solu-Medrol 125mg IV x1. (14 Nov 2019 22:53)      INTERVAL HPI/OVERNIGHT EVENTS:  Pt was admitted for 4 days of dizziness, during which there was intermittent dizziness for two days and constant dizziness for the next two days. Of note, the pt also has right ear fullness and experienced two episodes of NBNB emesis. No acute events overnight. Pt was seen and examined at bedside. Pt still reports feeling dizzy. Was able to walk through the hallway with assistance. Pt also complains of his right ear feeling full, however, on exam his left ear was impacted with wax. Otherwise, pt denies SOB, tachycardia, abdominal pain, constipation, and diarrhea.     T(C): 36.9 (11-15-19 @ 07:46), Max: 37 (11-15-19 @ 04:53)  HR: 101 (11-15-19 @ 10:36) (83 - 105)  BP: 154/105 (11-15-19 @ 10:36) (138/93 - 158/102)  RR: 18 (11-15-19 @ 07:46) (15 - 18)  SpO2: 97% (11-15-19 @ 10:36) (92% - 100%)  Wt(kg): --  I&O's Summary    14 Nov 2019 07:01  -  15 Nov 2019 07:00  --------------------------------------------------------  IN: 525 mL / OUT: 0 mL / NET: 525 mL        REVIEW OF SYSTEMS:  CONSTITUTIONAL: + dizziness. No fever, weight loss, or fatigue  EYES: No eye pain, visual disturbances, or discharge  ENMT:  +ear fullness, No difficulty hearing, tinnitus, vertigo; No sinus or throat pain  NECK: No pain or stiffness  BREASTS: No pain, no masses,   RESPIRATORY: No cough, wheezing, chills or hemoptysis; No shortness of breath  CARDIOVASCULAR: No chest pain, palpitations, dizziness, or leg swelling  GASTROINTESTINAL: No abdominal or epigastric pain. No nausea, vomiting, or hematemesis; No diarrhea or constipation. No melena or hematochezia.  GENITOURINARY: No dysuria, frequency, hematuria, or incontinence  NEUROLOGICAL: No headaches, memory loss, loss of strength, numbness, or tremors  SKIN: No itching, burning, rashes, or lesions   LYMPH NODES: No enlarged glands  MUSCULOSKELETAL: No joint pain or swelling; No muscle, back, or extremity pain  PSYCHIATRIC: No depression, anxiety, mood swings, or difficulty sleeping  ALLERY No hives or eczema    PHYSICAL EXAM:  GENERAL: NAD, well-groomed, well-developed  HEAD:  Atraumatic, Normocephalic  EYES: EOMI, conjunctiva and sclera clear  ENMT: +wax impaction in left ear. No tonsillar erythema, exudates, or enlargement; Moist mucous membranes, Good dentition, No lesions  NECK: Supple, No JVD, Normal thyroid  NERVOUS SYSTEM:  Alert & Oriented X3, Good concentration; Motor Strength 5/5 B/L upper and lower extremities  CHEST/LUNG: Clear to auscultation bilaterally; No rales, rhonchi, wheezing, or rubs  HEART: Regular rate and rhythm; No murmurs, rubs, or gallops  ABDOMEN: Soft, Nontender, Nondistended; Bowel sounds present  EXTREMITIES:  2+ Peripheral Pulses, No clubbing, cyanosis, or edema  LYMPH: No lymphadenopathy noted  SKIN: No rashes or lesions    MEDICATIONS  (STANDING):  amoxicillin  875 milliGRAM(s)/clavulanate 1 Tablet(s) Oral two times a day  aspirin 325 milliGRAM(s) Oral daily  atorvastatin 80 milliGRAM(s) Oral at bedtime  carbamide peroxide Otic Solution 1 Drop(s) Left Ear two times a day  dextrose 5%. 1000 milliLiter(s) (50 mL/Hr) IV Continuous <Continuous>  dextrose 50% Injectable 12.5 Gram(s) IV Push once  dextrose 50% Injectable 25 Gram(s) IV Push once  dextrose 50% Injectable 25 Gram(s) IV Push once  insulin lispro (HumaLOG) corrective regimen sliding scale   SubCutaneous Before meals and at bedtime  sodium chloride 0.9%. 1000 milliLiter(s) (75 mL/Hr) IV Continuous <Continuous>    MEDICATIONS  (PRN):  dextrose 40% Gel 15 Gram(s) Oral once PRN Blood Glucose LESS THAN 70 milliGRAM(s)/deciliter  glucagon  Injectable 1 milliGRAM(s) IntraMuscular once PRN Glucose LESS THAN 70 milligrams/deciliter      LABS:                        17.6   9.11  )-----------( 222      ( 15 Nov 2019 04:43 )             49.7     11-15    141  |  108  |  26<H>  ----------------------------<  304<H>  3.5   |  22  |  1.40<H>    Ca    8.8      15 Nov 2019 05:00        TPro  7.7  /  Alb  3.5  /  TBili  0.9  /  DBili  x   /  AST  20  /  ALT  38  /  AlkPhos  65  11-15        CAPILLARY BLOOD GLUCOSE                  RADIOLOGY & ADDITIONAL TESTS:      Imaging Personally Reviewed:       < end of copied text >  < from: MR Angio Neck w/wo IV Cont (11.15.19 @ 12:44) >  IMPRESSION:    MRI BRAIN: No acute intracranial findings. Mild to moderate chronic   microvascular ischemic disease.    MRA NECK: No vessel narrowing or occlusion in the neck. Dominant left   vertebral artery.    MRA HEAD: Multifocal loss of flow-related signal within the right   intradural vertebral artery, findings compatible with severe   stenosis/near-complete vessel occlusion.   ----------------------------------------------------------------------  NASCET criteria for internal carotid artery stenosis:  Mild: 0% to 49%  Moderate: 50% to 69%  Severe: 70% to 99%  Complete Occlusion          < from: CT Angio Neck w/ IV Cont (11.14.19 @ 21:14) >  IMPRESSION:   CT head: No acute hemorrhage or mass effect.    CTA head and neck: Diffusely hypoplastic right vertebral artery with   calcifications, irregularity and intermittent enhancement of the right V4   segment, suspicious for stenosis and/or occlusion.    Consider further evaluation with brain MRI if clinically warranted and if   there are no contraindications. Patient is a 54y old  Male who presents with a chief complaint of possible stroke. 14 Nov 2019 22:53)      HPI:  54M with PMH of HTN presents with dizziness for 4 days. States he feels like the room is spinning and has an unsteady gait. Reports intermittent dizziness for a 2 days but progressed to constant dizziness, he was unable to stand without holding unto objects near by. [facility] and couldn't walk without reaching and leaning on trees. States similar brief episode of dizziness last week but was able to go to work and symptoms resolved after a minute. Patient works as a  in [employer], skins and cuts fish, also does heavy loading. States diet consists of "eat on the go take out," including 7/11, bagels, soda, ice tea, deli food and chinese food. Admits to two episodes of non-bloody, non-bilious emesis and was unable to keep food down today, prompting urgent care evaluation. Patient was given Meclizine at the urgent care and was advised ED eval if symptoms persisted. Denies chest pain, palpitations, SOB, FRENCH, abd pain, c/d. Denies ear pain, sore throat. States his form of exercise is walking, as patient does not have a car.        admitted for stroke-like symptoms and vertebral artery stenosis/occlusion -     INTERVAL HPI/OVERNIGHT EVENTS:  Pt was admitted for 4 days of dizziness, during which there was intermittent dizziness for two days and constant dizziness for the next two days. Of note, the pt also has right ear fullness and experienced two episodes of NBNB emesis. No acute events overnight. Pt was seen and examined at bedside. Pt still reports feeling dizzy. Was able to walk through the hallway with assistance. Pt also complains of his right ear feeling full, however, on exam his left ear was impacted with wax. Otherwise, pt denies SOB, tachycardia, abdominal pain, constipation, and diarrhea.     T(C): 36.9 (11-15-19 @ 07:46), Max: 37 (11-15-19 @ 04:53)  HR: 101 (11-15-19 @ 10:36) (83 - 105)  BP: 154/105 (11-15-19 @ 10:36) (138/93 - 158/102)  RR: 18 (11-15-19 @ 07:46) (15 - 18)  SpO2: 97% (11-15-19 @ 10:36) (92% - 100%)  Wt(kg): --  I&O's Summary    14 Nov 2019 07:01  -  15 Nov 2019 07:00  --------------------------------------------------------  IN: 525 mL / OUT: 0 mL / NET: 525 mL        REVIEW OF SYSTEMS:  CONSTITUTIONAL: + dizziness. No fever, weight loss, or fatigue  EYES: No eye pain, visual disturbances, or discharge  ENMT:  +ear fullness, No difficulty hearing, tinnitus, +  vertigo; No sinus or throat pain  NECK: No pain or stiffness  RESPIRATORY: No cough, wheezing, chills ; No shortness of breath  CARDIOVASCULAR: No chest pain, palpitations, dizziness, or leg swelling  GASTROINTESTINAL: No abdominal or epigastric pain. No nausea, vomiting, No diarrhea or constipation.  GENITOURINARY: No dysuria, frequency, hematuria, or incontinence  NEUROLOGICAL: No headaches, memory loss, loss of strength, numbness, or tremors  SKIN: No itching, burning, rashes, or lesions   MUSCULOSKELETAL: No joint pain or swelling; No muscle, back, or extremity pain      PHYSICAL EXAM:  GENERAL: NAD, well-groomed, well-developed  HEAD:  Atraumatic, Normocephalic  EYES: EOMI, conjunctiva and sclera clear  ENMT: +wax impaction in left ear. Moist mucous membranes, No lesions  NECK: Supple, No JVD,   NERVOUS SYSTEM:  Alert & Oriented X3, Good concentration; Motor Strength 5/5 B/L upper and lower extremities  CHEST/LUNG:  auscultation bilaterally; No rales, rhonchi, wheezing,   HEART: Regular rate and rhythm; No murmurs, no tachy   ABDOMEN: Soft, Nontender, Nondistended; Bowel sounds present  EXTREMITIES:  2+ Peripheral Pulses, No clubbing, cyanosis, or edema  SKIN: No rashes or lesions    MEDICATIONS  (STANDING):  amoxicillin  875 milliGRAM(s)/clavulanate 1 Tablet(s) Oral two times a day  aspirin 325 milliGRAM(s) Oral daily  atorvastatin 80 milliGRAM(s) Oral at bedtime  carbamide peroxide Otic Solution 1 Drop(s) Left Ear two times a day  dextrose 5%. 1000 milliLiter(s) (50 mL/Hr) IV Continuous <Continuous>  dextrose 50% Injectable 12.5 Gram(s) IV Push once  dextrose 50% Injectable 25 Gram(s) IV Push once  dextrose 50% Injectable 25 Gram(s) IV Push once  insulin lispro (HumaLOG) corrective regimen sliding scale   SubCutaneous Before meals and at bedtime  sodium chloride 0.9%. 1000 milliLiter(s) (75 mL/Hr) IV Continuous <Continuous>    MEDICATIONS  (PRN):  dextrose 40% Gel 15 Gram(s) Oral once PRN Blood Glucose LESS THAN 70 milliGRAM(s)/deciliter  glucagon  Injectable 1 milliGRAM(s) IntraMuscular once PRN Glucose LESS THAN 70 milligrams/deciliter      LABS:                        17.6   9.11  )-----------( 222      ( 15 Nov 2019 04:43 )             49.7     11-15    141  |  108  |  26<H>  ----------------------------<  304<H>  3.5   |  22  |  1.40<H>    Ca    8.8      15 Nov 2019 05:00        TPro  7.7  /  Alb  3.5  /  TBili  0.9  /  DBili  x   /  AST  20  /  ALT  38  /  AlkPhos  65  11-15        CAPILLARY BLOOD GLUCOSE                  RADIOLOGY & ADDITIONAL TESTS:      Imaging Personally Reviewed:       < end of copied text >  < from: MR Angio Neck w/wo IV Cont (11.15.19 @ 12:44) >  IMPRESSION:    MRI BRAIN: No acute intracranial findings. Mild to moderate chronic   microvascular ischemic disease.    MRA NECK: No vessel narrowing or occlusion in the neck. Dominant left   vertebral artery.    MRA HEAD: Multifocal loss of flow-related signal within the right   intradural vertebral artery, findings compatible with severe   stenosis/near-complete vessel occlusion.   ----------------------------------------------------------------------  NASCET criteria for internal carotid artery stenosis:  Mild: 0% to 49%  Moderate: 50% to 69%  Severe: 70% to 99%  Complete Occlusion          < from: CT Angio Neck w/ IV Cont (11.14.19 @ 21:14) >  IMPRESSION:   CT head: No acute hemorrhage or mass effect.    CTA head and neck: Diffusely hypoplastic right vertebral artery with   calcifications, irregularity and intermittent enhancement of the right V4   segment, suspicious for stenosis and/or occlusion.    Consider further evaluation with brain MRI if clinically warranted and if   there are no contraindications.

## 2019-11-15 NOTE — DISCHARGE NOTE NURSING/CASE MANAGEMENT/SOCIAL WORK - NSDCPELOVENOX_GEN_ALL_CORE
Enoxaparin/Lovenox - Dietary Advice/Enoxaparin/Lovenox - Follow up monitoring/Enoxaparin/Lovenox - Potential for adverse drug reactions and interactions/Enoxaparin/Lovenox - Compliance

## 2019-11-15 NOTE — PROGRESS NOTE ADULT - PROBLEM SELECTOR PLAN 4
chronic, stable  -Allow for permissive hypertension, hold home medications metoprolol and amlodipine HbA1c 9.5  no History of DM, patient denies family history  Moderate dose Insulin sliding scale, accuchecks, hypoglycemia protocol HbA1c 9.5 - new dm type2 controlled   no History of DM, patient denies family history  Moderate dose Insulin sliding scale, accuchecks, hypoglycemia protocol

## 2019-11-15 NOTE — PROGRESS NOTE ADULT - PROBLEM SELECTOR PLAN 5
IMPROVE VTE Individual Risk Assessment          RISK                                                          Points  [  ] Previous VTE                                                3  [  ] Thrombophilia                                             2  [  ] Lower limb paralysis                                   2        (unable to hold up >15 seconds)    [  ] Current Cancer                                             2         (within 6 months)  [  ] Immobilization > 24 hrs                              1  [  ] ICU/CCU stay > 24 hours                             1  [  ] Age > 60                                                         1    IMPROVE VTE Score: 0  DVT prophylaxis: SCDs  Fall risk Patient admits to R ear fullness. R tympanic membrane bulging and red on admission  - repeat exam with clear TM, no fullness  -s/p 1 dose of Augmentin in the ED. Continue Augmentin BID x 7 days  -Ordered debrox- 1 drop twice daily to L ear wax impaction

## 2019-11-15 NOTE — PHYSICAL THERAPY INITIAL EVALUATION ADULT - ADDITIONAL COMMENTS
Pt independent in all ADL and amb. Pt took train to work, does not currently drive Pt independent in all ADL and amb. Pt took train to work, does not currently drive. Mobility score = 18, MRS= 3

## 2019-11-15 NOTE — DISCHARGE NOTE NURSING/CASE MANAGEMENT/SOCIAL WORK - PATIENT PORTAL LINK FT
You can access the FollowMyHealth Patient Portal offered by [facility] by registering at the following website: [email or web address]. By joining [facility] [employer] [facility]’s FollowMyHealth portal, you will also be able to view your health information using other applications (apps) compatible with our system.

## 2019-11-15 NOTE — SWALLOW BEDSIDE ASSESSMENT ADULT - SLP PERTINENT HISTORY OF CURRENT PROBLEM
Per charting, 54M with PMH of HTN presents with dizziness for 4 days admitted for stroke-like symptoms and vertebral artery stenosis/occlusion.

## 2019-11-15 NOTE — PROGRESS NOTE ADULT - PROBLEM SELECTOR PLAN 6
chronic, stable  -Allow for permissive hypertension, hold home medications metoprolol and amlodipine chronic, stable  cont bp meds chronic, stable  - Started Lisinopril 2.5 daily

## 2019-11-15 NOTE — SWALLOW BEDSIDE ASSESSMENT ADULT - SWALLOW EVAL: RECOMMENDED FEEDING/EATING TECHNIQUES
position upright (90 degrees)/maintain upright posture during/after eating for 30 mins/alternate food with liquid/oral hygiene/allow for swallow between intakes

## 2019-11-15 NOTE — PROGRESS NOTE ADULT - PROBLEM SELECTOR PLAN 3
Patient admits to R ear fullness. R tympanic membrane bulging and red   -s/p 1 dose of Augmentin in the ED. Continue Augmentin BID x 7 days Patient admits to R ear fullness. R tympanic membrane bulging and red   -s/p 1 dose of Augmentin in the ED. Continue Augmentin BID x 7 days  -Ordered debrox- 1 drop twice daily likely 2/2 vertebral artery disease, could be BPPV keeping Meniere's in mind, hearing loss, vertigo symptoms in R ear  - continue antivert for vertigo. likely 2/2 vertebral artery disease, could be BPPV keeping Meniere's in mind, hearing loss, vertigo symptoms in R ear  - continue Antivert for vertigo.

## 2019-11-15 NOTE — SWALLOW BEDSIDE ASSESSMENT ADULT - ASR SWALLOW ASPIRATION MONITOR
position upright (90Y)/change of breathing pattern/gurgly voice/oral hygiene/pneumonia/upper respiratory infection/fever/throat clearing/cough

## 2019-11-16 LAB
ANION GAP SERPL CALC-SCNC: 9 MMOL/L — SIGNIFICANT CHANGE UP (ref 5–17)
BUN SERPL-MCNC: 35 MG/DL — HIGH (ref 7–23)
CALCIUM SERPL-MCNC: 8.2 MG/DL — LOW (ref 8.5–10.1)
CHLORIDE SERPL-SCNC: 108 MMOL/L — SIGNIFICANT CHANGE UP (ref 96–108)
CO2 SERPL-SCNC: 24 MMOL/L — SIGNIFICANT CHANGE UP (ref 22–31)
CREAT SERPL-MCNC: 1.3 MG/DL — SIGNIFICANT CHANGE UP (ref 0.5–1.3)
GLUCOSE SERPL-MCNC: 245 MG/DL — HIGH (ref 70–99)
HCT VFR BLD CALC: 49 % — SIGNIFICANT CHANGE UP (ref 39–50)
HGB BLD-MCNC: 16.9 G/DL — SIGNIFICANT CHANGE UP (ref 13–17)
MCHC RBC-ENTMCNC: 32.3 PG — SIGNIFICANT CHANGE UP (ref 27–34)
MCHC RBC-ENTMCNC: 34.5 GM/DL — SIGNIFICANT CHANGE UP (ref 32–36)
MCV RBC AUTO: 93.5 FL — SIGNIFICANT CHANGE UP (ref 80–100)
NRBC # BLD: 0 /100 WBCS — SIGNIFICANT CHANGE UP (ref 0–0)
PLATELET # BLD AUTO: 217 K/UL — SIGNIFICANT CHANGE UP (ref 150–400)
POTASSIUM SERPL-MCNC: 3.4 MMOL/L — LOW (ref 3.5–5.3)
POTASSIUM SERPL-SCNC: 3.4 MMOL/L — LOW (ref 3.5–5.3)
RBC # BLD: 5.24 M/UL — SIGNIFICANT CHANGE UP (ref 4.2–5.8)
RBC # FLD: 12 % — SIGNIFICANT CHANGE UP (ref 10.3–14.5)
SODIUM SERPL-SCNC: 141 MMOL/L — SIGNIFICANT CHANGE UP (ref 135–145)
WBC # BLD: 13.41 K/UL — HIGH (ref 3.8–10.5)
WBC # FLD AUTO: 13.41 K/UL — HIGH (ref 3.8–10.5)

## 2019-11-16 PROCEDURE — 99233 SBSQ HOSP IP/OBS HIGH 50: CPT | Mod: GC

## 2019-11-16 RX ORDER — METFORMIN HYDROCHLORIDE 850 MG/1
1 TABLET ORAL
Qty: 60 | Refills: 0
Start: 2019-11-16 | End: 2019-12-15

## 2019-11-16 RX ORDER — INSULIN NPH HUM/REG INSULIN HM 70-30/ML
6 VIAL (ML) SUBCUTANEOUS
Qty: 27 | Refills: 0
Start: 2019-11-16 | End: 2019-12-15

## 2019-11-16 RX ORDER — METFORMIN HYDROCHLORIDE 850 MG/1
1 TABLET ORAL
Qty: 30 | Refills: 0
Start: 2019-11-16 | End: 2019-12-15

## 2019-11-16 RX ORDER — METOPROLOL TARTRATE 50 MG
50 TABLET ORAL DAILY
Refills: 0 | Status: DISCONTINUED | OUTPATIENT
Start: 2019-11-16 | End: 2019-11-17

## 2019-11-16 RX ORDER — POTASSIUM CHLORIDE 20 MEQ
40 PACKET (EA) ORAL ONCE
Refills: 0 | Status: COMPLETED | OUTPATIENT
Start: 2019-11-16 | End: 2019-11-16

## 2019-11-16 RX ADMIN — ATORVASTATIN CALCIUM 80 MILLIGRAM(S): 80 TABLET, FILM COATED ORAL at 21:13

## 2019-11-16 RX ADMIN — Medication 4: at 08:35

## 2019-11-16 RX ADMIN — Medication 40 MILLIEQUIVALENT(S): at 19:22

## 2019-11-16 RX ADMIN — Medication 1 TABLET(S): at 17:52

## 2019-11-16 RX ADMIN — Medication 3 UNIT(S): at 17:48

## 2019-11-16 RX ADMIN — Medication 25 MILLIGRAM(S): at 21:13

## 2019-11-16 RX ADMIN — CARBAMIDE PEROXIDE 1 DROP(S): 81.86 SOLUTION/ DROPS AURICULAR (OTIC) at 07:13

## 2019-11-16 RX ADMIN — Medication 50 MILLIGRAM(S): at 17:52

## 2019-11-16 RX ADMIN — Medication 2: at 12:56

## 2019-11-16 RX ADMIN — Medication 25 MILLIGRAM(S): at 07:08

## 2019-11-16 RX ADMIN — Medication 3 UNIT(S): at 08:36

## 2019-11-16 RX ADMIN — Medication 3 UNIT(S): at 12:57

## 2019-11-16 RX ADMIN — Medication 25 MILLIGRAM(S): at 13:00

## 2019-11-16 RX ADMIN — CARBAMIDE PEROXIDE 1 DROP(S): 81.86 SOLUTION/ DROPS AURICULAR (OTIC) at 17:55

## 2019-11-16 RX ADMIN — LISINOPRIL 2.5 MILLIGRAM(S): 2.5 TABLET ORAL at 07:08

## 2019-11-16 RX ADMIN — Medication 325 MILLIGRAM(S): at 13:00

## 2019-11-16 RX ADMIN — Medication 1 TABLET(S): at 07:08

## 2019-11-16 RX ADMIN — Medication 4: at 17:48

## 2019-11-16 NOTE — PROGRESS NOTE ADULT - SUBJECTIVE AND OBJECTIVE BOX
Neurology follow up note  COVERING DR MICHAEL GIMENEZ54yMale      Interval History:    Patient still with vertigo overall better     MEDICATIONS    amoxicillin  875 milliGRAM(s)/clavulanate 1 Tablet(s) Oral two times a day  aspirin 325 milliGRAM(s) Oral daily  atorvastatin 80 milliGRAM(s) Oral at bedtime  carbamide peroxide Otic Solution 1 Drop(s) Left Ear two times a day  dextrose 40% Gel 15 Gram(s) Oral once PRN  dextrose 5%. 1000 milliLiter(s) IV Continuous <Continuous>  dextrose 50% Injectable 12.5 Gram(s) IV Push once  dextrose 50% Injectable 25 Gram(s) IV Push once  dextrose 50% Injectable 25 Gram(s) IV Push once  glucagon  Injectable 1 milliGRAM(s) IntraMuscular once PRN  insulin lispro (HumaLOG) corrective regimen sliding scale   SubCutaneous three times a day before meals  insulin lispro (HumaLOG) corrective regimen sliding scale   SubCutaneous at bedtime  insulin lispro Injectable (HumaLOG) 3 Unit(s) SubCutaneous three times a day with meals  lisinopril 2.5 milliGRAM(s) Oral daily  meclizine 25 milliGRAM(s) Oral every 8 hours  sodium chloride 0.9%. 1000 milliLiter(s) IV Continuous <Continuous>      Allergies    No Known Allergies    Intolerances            Vital Signs Last 24 Hrs  T(C): 36.9 (16 Nov 2019 12:11), Max: 36.9 (16 Nov 2019 12:11)  T(F): 98.4 (16 Nov 2019 12:11), Max: 98.4 (16 Nov 2019 12:11)  HR: 97 (16 Nov 2019 12:11) (76 - 97)  BP: 149/109 (16 Nov 2019 12:11) (138/96 - 165/99)  BP(mean): --  RR: 18 (16 Nov 2019 12:11) (18 - 19)  SpO2: 95% (16 Nov 2019 12:11) (94% - 95%)      REVIEW OF SYSTEMS:     Constitutional: No fever, chills, fatigue, weakness  Eyes: no eye pain, visual disturbances, or discharge  ENT:  No difficulty hearing, tinnitus, vertigo; No sinus or throat pain  Neck: No pain or stiffness  Respiratory: No cough, dyspnea, wheezing   Cardiovascular: No chest pain, palpitations,   Gastrointestinal: No abdominal or epigastric pain. No nausea, vomiting  No diarrhea or constipation.   Genitourinary: No dysuria, frequency, hematuria or incontinence  Neurological: No headaches, lightheadedness, less vertigo, numbness or tremors  Psychiatric: No depression, anxiety, mood swings or difficulty sleeping  Musculoskeletal: No joint pain or swelling; No muscle, back or extremity pain  Skin: No itching, burning, rashes or lesions   Lymph Nodes: No enlarged glands  Endocrine: No heat or cold intolerance; No hair loss   Allergy and Immunologic: No hives or eczema    On Neurological Examination:    Mental Status - Patient is alert, awake, oriented X3.       Follow simple commands  Follow complex commands    Speech -   Fluent           Cranial Nerves - Pupils 3 mm equal and reactive to light,   extraocular eye movements intact.   smile symmetric  intact bilateral NLF    Motor Exam -   Right upper 4/5  Left upper 4/5  Right lower 4/5  Left lower  4/5    Muscle tone - is normal all over.  No asymmetry is seen.      Sensory    Bilateral intact to light touch    GENERAL Exam: Nontoxic , No Acute Distress   	  HEENT:  normocephalic, atraumatic  		  LUNGS: Clear bilaterally    	  HEART: Normal S1S2   No murmur RRR        	  GI/ ABDOMEN:  Soft  Non tender    EXTREMITIES:   No Edema  No Clubbing  No Cyanosis     MUSCULOSKELETAL: Normal Range of Motion  	   SKIN: Normal  No Ecchymosis               LABS:  CBC Full  -  ( 16 Nov 2019 07:03 )  WBC Count : 13.41 K/uL  RBC Count : 5.24 M/uL  Hemoglobin : 16.9 g/dL  Hematocrit : 49.0 %  Platelet Count - Automated : 217 K/uL  Mean Cell Volume : 93.5 fl  Mean Cell Hemoglobin : 32.3 pg  Mean Cell Hemoglobin Concentration : 34.5 gm/dL  Auto Neutrophil # : x  Auto Lymphocyte # : x  Auto Monocyte # : x  Auto Eosinophil # : x  Auto Basophil # : x  Auto Neutrophil % : x  Auto Lymphocyte % : x  Auto Monocyte % : x  Auto Eosinophil % : x  Auto Basophil % : x      11-16    141  |  108  |  35<H>  ----------------------------<  245<H>  3.4<L>   |  24  |  1.30    Ca    8.2<L>      16 Nov 2019 07:03    TPro  7.7  /  Alb  3.5  /  TBili  0.9  /  DBili  x   /  AST  20  /  ALT  38  /  AlkPhos  65  11-15    Hemoglobin A1C:     LIVER FUNCTIONS - ( 15 Nov 2019 05:00 )  Alb: 3.5 g/dL / Pro: 7.7 g/dL / ALK PHOS: 65 U/L / ALT: 38 U/L / AST: 20 U/L / GGT: x           Vitamin B12         RADIOLOGY  ASSESSMENT AND PLAN     vertigo mri negative inner ear dysfunction  Antivert 25 tid  antiemetics   mra noted antiplatelets and statin   NS follow up       Physical therapy evaluation if possible and as tolerated .  OOB to chair/ambulation with assistance only.    40 minutes spent on total encounter; more than 50% of the visit was spent counseling and/or coordinating care by the attending physician.

## 2019-11-16 NOTE — PROGRESS NOTE ADULT - PROBLEM SELECTOR PLAN 2
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 MRI not showing acute disease, likely chronic complete stenosis.

## 2019-11-16 NOTE — PROGRESS NOTE ADULT - PROBLEM SELECTOR PLAN 4
HbA1c 9.5 - new dm type2 controlled   no History of DM, patient denies family history  Moderate dose Insulin sliding scale, accuchecks, hypoglycemia protocol

## 2019-11-16 NOTE — PROGRESS NOTE ADULT - PROBLEM SELECTOR PLAN 5
Patient admits to R ear fullness. R tympanic membrane bulging and red on admission  - repeat exam with clear TM, no fullness  -s/p 1 dose of Augmentin in the ED. Continue Augmentin BID x 7 days  -Ordered debrox- 1 drop twice daily to L ear wax impaction

## 2019-11-16 NOTE — PROGRESS NOTE ADULT - SUBJECTIVE AND OBJECTIVE BOX
Patient is a 54y old  Male who presents with a chief complaint of possible stroke (16 Nov 2019 14:04)      HPI:  54M with PMH of HTN presents with dizziness for 4 days. States he feels like the room is spinning and has an unsteady gait. Reports intermittent dizziness for a 2 days but progressed to constant dizziness, he was unable to stand without holding unto objects near by. Patient walks to the [facility] for work daily and couldn't walk without reaching and leaning on trees. States similar brief episode of dizziness last week but was able to go to work and symptoms resolved after a minute. Patient works as a  in [employer], skins and cuts fish, also does heavy loading. States diet consists of "eat on the go take out," including 7/11, bagels, soda, ice tea, deli food and chinese food. Admits to two episodes of non-bloody, non-bilious emesis and was unable to keep food down today, prompting urgent care evaluation. Patient was given Meclizine at the urgent care and was advised ED eval if symptoms persisted. Denies chest pain, palpitations, SOB, FRENCH, abd pain, c/d. Denies ear pain, sore throat. States his form of exercise is walking, as patient does not have a car.      admitted for stroke-like symptoms and vertebral artery stenosis/occlusion.-   INTERVAL HPI/OVERNIGHT EVENTS:  . Pt was seen and examined at bedside.  alert awake  Pt still reports feeling dizzy.   Was able to walk through the hallway with assistance.   INTERVAL HPI/OVERNIGHT EVENTS:  T(C): 36.9 (11-16-19 @ 12:11), Max: 36.9 (11-16-19 @ 12:11)  HR: 97 (11-16-19 @ 12:11) (76 - 97)  BP: 149/109 (11-16-19 @ 12:11) (138/96 - 165/99)  RR: 18 (11-16-19 @ 12:11) (18 - 19)  SpO2: 95% (11-16-19 @ 12:11) (94% - 95%)  Wt(kg): --  I&O's Summary          REVIEW OF SYSTEMS:  CONSTITUTIONAL: + dizziness. No fever, weight loss, or fatigue  EYES: No eye pain, visual disturbances, or discharge  ENMT:  +ear fullness, No difficulty hearing, tinnitus, +  vertigo; No sinus or throat pain  NECK: No pain or stiffness  RESPIRATORY: No cough, wheezing, chills ; No shortness of breath  CARDIOVASCULAR: No chest pain, palpitations, dizziness, or leg swelling  GASTROINTESTINAL: No abdominal or epigastric pain. No nausea, vomiting, No diarrhea or constipation.  GENITOURINARY: No dysuria, frequency, hematuria, or incontinence  NEUROLOGICAL: No headaches, memory loss, loss of strength, numbness, or tremors  SKIN: No itching, burning, rashes, or lesions   MUSCULOSKELETAL: No joint pain or swelling; No muscle, back, or extremity pain      PHYSICAL EXAM:  GENERAL: NAD, well-groomed, well-developed  HEAD:  Atraumatic, Normocephalic  EYES: EOMI, conjunctiva and sclera clear  ENMT: +wax impaction in left ear. Moist mucous membranes, No lesions  NECK: Supple, No JVD,   NERVOUS SYSTEM:  Alert & Oriented X3, Good concentration; Motor Strength 5/5 B/L upper and lower extremities  CHEST/LUNG:  auscultation bilaterally; No rales, rhonchi, wheezing,   HEART: Regular rate and rhythm; No murmurs, no tachy   ABDOMEN: Soft, Nontender, Nondistended; Bowel sounds present  EXTREMITIES:  2+ Peripheral Pulses, No clubbing, cyanosis, or edema  SKIN: No rashes or lesions      MEDICATIONS  (STANDING):  amoxicillin  875 milliGRAM(s)/clavulanate 1 Tablet(s) Oral two times a day  aspirin 325 milliGRAM(s) Oral daily  atorvastatin 80 milliGRAM(s) Oral at bedtime  carbamide peroxide Otic Solution 1 Drop(s) Left Ear two times a day  dextrose 5%. 1000 milliLiter(s) (50 mL/Hr) IV Continuous <Continuous>  dextrose 50% Injectable 12.5 Gram(s) IV Push once  dextrose 50% Injectable 25 Gram(s) IV Push once  dextrose 50% Injectable 25 Gram(s) IV Push once  insulin lispro (HumaLOG) corrective regimen sliding scale   SubCutaneous three times a day before meals  insulin lispro (HumaLOG) corrective regimen sliding scale   SubCutaneous at bedtime  insulin lispro Injectable (HumaLOG) 3 Unit(s) SubCutaneous three times a day with meals  lisinopril 2.5 milliGRAM(s) Oral daily  meclizine 25 milliGRAM(s) Oral every 8 hours  sodium chloride 0.9%. 1000 milliLiter(s) (75 mL/Hr) IV Continuous <Continuous>    MEDICATIONS  (PRN):  dextrose 40% Gel 15 Gram(s) Oral once PRN Blood Glucose LESS THAN 70 milliGRAM(s)/deciliter  glucagon  Injectable 1 milliGRAM(s) IntraMuscular once PRN Glucose LESS THAN 70 milligrams/deciliter      LABS:                        16.9   13.41 )-----------( 217      ( 16 Nov 2019 07:03 )             49.0     11-16    141  |  108  |  35<H>  ----------------------------<  245<H>  3.4<L>   |  24  |  1.30    Ca    8.2<L>      16 Nov 2019 07:03    TPro  7.7  /  Alb  3.5  /  TBili  0.9  /  DBili  x   /  AST  20  /  ALT  38  /  AlkPhos  65  11-15        CAPILLARY BLOOD GLUCOSE      POCT Blood Glucose.: 193 mg/dL (16 Nov 2019 12:04)  POCT Blood Glucose.: 226 mg/dL (16 Nov 2019 08:22)  POCT Blood Glucose.: 243 mg/dL (15 Nov 2019 22:35)  POCT Blood Glucose.: 294 mg/dL (15 Nov 2019 16:45)              RADIOLOGY & ADDITIONAL TESTS:    Imaging Personally Reviewed:   no new  test     Advance Directives:  full code     Palliative Care:

## 2019-11-17 PROCEDURE — 99233 SBSQ HOSP IP/OBS HIGH 50: CPT | Mod: GC

## 2019-11-17 RX ORDER — CARBAMIDE PEROXIDE 81.86 MG/ML
1 SOLUTION/ DROPS AURICULAR (OTIC)
Refills: 0 | Status: DISCONTINUED | OUTPATIENT
Start: 2019-11-17 | End: 2019-11-21

## 2019-11-17 RX ORDER — CARBAMIDE PEROXIDE 81.86 MG/ML
1 SOLUTION/ DROPS AURICULAR (OTIC)
Refills: 0 | Status: DISCONTINUED | OUTPATIENT
Start: 2019-11-17 | End: 2019-11-17

## 2019-11-17 RX ORDER — INSULIN LISPRO 100/ML
4 VIAL (ML) SUBCUTANEOUS
Refills: 0 | Status: DISCONTINUED | OUTPATIENT
Start: 2019-11-17 | End: 2019-11-21

## 2019-11-17 RX ORDER — METOPROLOL TARTRATE 50 MG
50 TABLET ORAL
Refills: 0 | Status: DISCONTINUED | OUTPATIENT
Start: 2019-11-17 | End: 2019-11-18

## 2019-11-17 RX ORDER — MECLIZINE HCL 12.5 MG
37.5 TABLET ORAL EVERY 8 HOURS
Refills: 0 | Status: DISCONTINUED | OUTPATIENT
Start: 2019-11-17 | End: 2019-11-21

## 2019-11-17 RX ORDER — MECLIZINE HCL 12.5 MG
35.5 TABLET ORAL EVERY 8 HOURS
Refills: 0 | Status: DISCONTINUED | OUTPATIENT
Start: 2019-11-17 | End: 2019-11-17

## 2019-11-17 RX ADMIN — Medication 4 UNIT(S): at 17:34

## 2019-11-17 RX ADMIN — ATORVASTATIN CALCIUM 80 MILLIGRAM(S): 80 TABLET, FILM COATED ORAL at 22:13

## 2019-11-17 RX ADMIN — Medication 50 MILLIGRAM(S): at 05:02

## 2019-11-17 RX ADMIN — Medication 37.5 MILLIGRAM(S): at 22:13

## 2019-11-17 RX ADMIN — Medication 37.5 MILLIGRAM(S): at 15:22

## 2019-11-17 RX ADMIN — Medication 1 TABLET(S): at 17:38

## 2019-11-17 RX ADMIN — CARBAMIDE PEROXIDE 1 DROP(S): 81.86 SOLUTION/ DROPS AURICULAR (OTIC) at 17:39

## 2019-11-17 RX ADMIN — Medication 3 UNIT(S): at 12:41

## 2019-11-17 RX ADMIN — Medication 4: at 12:40

## 2019-11-17 RX ADMIN — Medication 50 MILLIGRAM(S): at 19:00

## 2019-11-17 RX ADMIN — Medication 3 UNIT(S): at 08:21

## 2019-11-17 RX ADMIN — Medication 1 TABLET(S): at 05:02

## 2019-11-17 RX ADMIN — Medication 2: at 17:35

## 2019-11-17 RX ADMIN — Medication 25 MILLIGRAM(S): at 05:02

## 2019-11-17 RX ADMIN — Medication 4: at 08:21

## 2019-11-17 RX ADMIN — CARBAMIDE PEROXIDE 1 DROP(S): 81.86 SOLUTION/ DROPS AURICULAR (OTIC) at 05:01

## 2019-11-17 RX ADMIN — Medication 325 MILLIGRAM(S): at 12:15

## 2019-11-17 RX ADMIN — LISINOPRIL 2.5 MILLIGRAM(S): 2.5 TABLET ORAL at 05:02

## 2019-11-17 NOTE — PROGRESS NOTE ADULT - PROBLEM SELECTOR PLAN 5
Patient admits to R ear fullness. R tympanic membrane bulging and red on admission  - repeat exam with clear TM, no fullness  -s/p 1 dose of Augmentin in the ED. Continue Augmentin BID x 7 days  -Ordered debrox- 1 drop twice daily to L and rt  ear wax impaction

## 2019-11-17 NOTE — PROGRESS NOTE ADULT - PROBLEM SELECTOR PLAN 4
HbA1c 9.5 - new dm type2 un controlled   no History of DM, patient denies family history , on Humalog dose adjusted   Moderate dose Insulin sliding scale, accuchecks, hypoglycemia protocol

## 2019-11-17 NOTE — PROGRESS NOTE ADULT - SUBJECTIVE AND OBJECTIVE BOX
Neurology follow up note  COVERING DR MICHAEL GIMENEZ54yMale      Interval History:    Patient feels ok no new complaints.    MEDICATIONS    amoxicillin  875 milliGRAM(s)/clavulanate 1 Tablet(s) Oral two times a day  aspirin 325 milliGRAM(s) Oral daily  atorvastatin 80 milliGRAM(s) Oral at bedtime  carbamide peroxide Otic Solution 1 Drop(s) Both Ears two times a day  dextrose 40% Gel 15 Gram(s) Oral once PRN  dextrose 5%. 1000 milliLiter(s) IV Continuous <Continuous>  dextrose 50% Injectable 12.5 Gram(s) IV Push once  dextrose 50% Injectable 25 Gram(s) IV Push once  dextrose 50% Injectable 25 Gram(s) IV Push once  glucagon  Injectable 1 milliGRAM(s) IntraMuscular once PRN  insulin lispro (HumaLOG) corrective regimen sliding scale   SubCutaneous three times a day before meals  insulin lispro (HumaLOG) corrective regimen sliding scale   SubCutaneous at bedtime  insulin lispro Injectable (HumaLOG) 4 Unit(s) SubCutaneous three times a day with meals  lisinopril 2.5 milliGRAM(s) Oral daily  meclizine 37.5 milliGRAM(s) Oral every 8 hours  metoprolol tartrate 50 milliGRAM(s) Oral two times a day  sodium chloride 0.9%. 1000 milliLiter(s) IV Continuous <Continuous>      Allergies    No Known Allergies    Intolerances            Vital Signs Last 24 Hrs  T(C): 36.7 (17 Nov 2019 11:58), Max: 36.9 (16 Nov 2019 20:01)  T(F): 98 (17 Nov 2019 11:58), Max: 98.4 (16 Nov 2019 20:01)  HR: 71 (17 Nov 2019 11:58) (70 - 100)  BP: 148/108 (17 Nov 2019 11:58) (148/103 - 154/104)  BP(mean): --  RR: 19 (17 Nov 2019 11:58) (17 - 19)  SpO2: 97% (17 Nov 2019 11:58) (95% - 97%)      REVIEW OF SYSTEMS:     Constitutional: No fever, chills, fatigue, weakness  Eyes: no eye pain, visual disturbances, or discharge  ENT:  No difficulty hearing, tinnitus, vertigo; No sinus or throat pain  Neck: No pain or stiffness  Respiratory: No cough, dyspnea, wheezing   Cardiovascular: No chest pain, palpitations,   Gastrointestinal: No abdominal or epigastric pain. No nausea, vomiting  No diarrhea or constipation.   Genitourinary: No dysuria, frequency, hematuria or incontinence  Neurological: No headaches, lightheadedness, less vertigo, numbness or tremors  Psychiatric: No depression, anxiety, mood swings or difficulty sleeping  Musculoskeletal: No joint pain or swelling; No muscle, back or extremity pain  Skin: No itching, burning, rashes or lesions   Lymph Nodes: No enlarged glands  Endocrine: No heat or cold intolerance; No hair loss   Allergy and Immunologic: No hives or eczema    On Neurological Examination:    Mental Status - Patient is alert, awake, oriented X3.       Follow simple commands  Follow complex commands    Speech -   Fluent           Cranial Nerves - Pupils 3 mm equal and reactive to light,   extraocular eye movements intact.   smile symmetric  intact bilateral NLF    Motor Exam -   Right upper 4/5  Left upper 4/5  Right lower 4/5  Left lower  4/5  gait was able to walk with me no significant ataxia     Muscle tone - is normal all over.  No asymmetry is seen.      Sensory    Bilateral intact to light touch    GENERAL Exam: Nontoxic , No Acute Distress   	  HEENT:  normocephalic, atraumatic  		  LUNGS: Clear bilaterally    	  HEART: Normal S1S2   No murmur RRR        	  GI/ ABDOMEN:  Soft  Non tender    EXTREMITIES:   No Edema  No Clubbing  No Cyanosis     MUSCULOSKELETAL: Normal Range of Motion  	   SKIN: Normal  No Ecchymosis             LABS:  CBC Full  -  ( 16 Nov 2019 07:03 )  WBC Count : 13.41 K/uL  RBC Count : 5.24 M/uL  Hemoglobin : 16.9 g/dL  Hematocrit : 49.0 %  Platelet Count - Automated : 217 K/uL  Mean Cell Volume : 93.5 fl  Mean Cell Hemoglobin : 32.3 pg  Mean Cell Hemoglobin Concentration : 34.5 gm/dL  Auto Neutrophil # : x  Auto Lymphocyte # : x  Auto Monocyte # : x  Auto Eosinophil # : x  Auto Basophil # : x  Auto Neutrophil % : x  Auto Lymphocyte % : x  Auto Monocyte % : x  Auto Eosinophil % : x  Auto Basophil % : x      11-16    141  |  108  |  35<H>  ----------------------------<  245<H>  3.4<L>   |  24  |  1.30    Ca    8.2<L>      16 Nov 2019 07:03      Hemoglobin A1C:       Vitamin B12         RADIOLOGY    ASSESSMENT AND PLAN     vertigo mri negative inner ear dysfunction  increase Antivert 37.5 tid  antiemetics   mra noted antiplatelets and statin   NS follow up   will need ENT follow up     Physical therapy evaluation if possible and as tolerated .  OOB to chair/ambulation with assistance only.    40 minutes spent on total encounter; more than 50% of the visit was spent counseling and/or coordinating care by the attending physician.

## 2019-11-17 NOTE — PROGRESS NOTE ADULT - PROBLEM SELECTOR PLAN 3
likely 2/2 vertebral artery disease, could be BPPV   - continue Antivert for vertigo  increased dose  today .

## 2019-11-17 NOTE — PROGRESS NOTE ADULT - PROBLEM SELECTOR PLAN 1
Patient with persistent dizziness for 4days- Antivert now on 37.5 mg tid.  -ruled out cva  and tia .   -IVF started 75cc/hr x 12 hours. H/H mildly elevated likely due to hemoconcentration as patient reports vomiting and minimal intake today  -Start ASA 325mg daily  -MRA head and neck shows vertebral artery calcifications and near complete stenosis  -MRI showing no acute disease, near complete occlusion likely congenital or chronic  -Hemoglobin A1C- 9.5  -TSH- 0.64  -Cholesterol- 310, LDL- 239, HDL- 49  -Speech and swallow evaluation- recommended regular solids with thin liquids

## 2019-11-17 NOTE — PROGRESS NOTE ADULT - SUBJECTIVE AND OBJECTIVE BOX
Patient is a 54y old  Male who presents with a chief complaint of possible stroke (16 Nov 2019 14:53)      HPI:  54M with PMH of HTN presents with dizziness for 4 days. States he feels like the room is spinning and has an unsteady gait. Reports intermittent dizziness for a 2 days but progressed to constant dizziness, he was unable to stand without holding unto objects near by. Patient walks to the [facility] for work daily and couldn't walk without reaching and leaning on trees. States similar brief episode of dizziness last week but was able to go to work and symptoms resolved after a minute. Patient works as a  in [employer], skins and cuts fish, also does heavy loading. States diet consists of "eat on the go take out," including 7/11, bagels, soda, ice tea, deli food and chinese food. Admits to two episodes of non-bloody, non-bilious emesis and was unable to keep food down today, prompting urgent care evaluation. Patient was given Meclizine at the urgent care and was advised ED eval if symptoms persisted. Denies chest pain, palpitations, SOB, FRENCH, abd pain, c/d. Denies ear pain, sore throat. States his form of exercise is walking, as patient does not have a car.        admitted for stroke-like symptoms and vertebral artery stenosis/occlusion.-   INTERVAL HPI/OVERNIGHT EVENTS:  . Pt was seen and examined at bedside.  alert awake  Pt still reports feeling   dizzy but did try to walk , no headache , no pass out , no distress , tolerating po .   INTERVAL HPI/OVERNIGHT EVENTS:  T(C): 36.7 (11-17-19 @ 11:58), Max: 36.9 (11-16-19 @ 20:01)  HR: 71 (11-17-19 @ 11:58) (70 - 100)  BP: 148/108 (11-17-19 @ 11:58) (148/103 - 154/104)  RR: 19 (11-17-19 @ 11:58) (17 - 19)  SpO2: 97% (11-17-19 @ 11:58) (95% - 97%)  Wt(kg): --  I&O's Summary              REVIEW OF SYSTEMS:  CONSTITUTIONAL: + dizziness. No fever, weight loss, or fatigue  EYES: No eye pain, visual disturbances, or discharge  ENMT:  +ear fullness, No difficulty hearing, tinnitus, +  vertigo; No sinus or throat pain  NECK: No pain or stiffness  RESPIRATORY: No cough, wheezing, chills ; No shortness of breath  CARDIOVASCULAR: No chest pain, palpitations, dizziness, or leg swelling  GASTROINTESTINAL: No abdominal or epigastric pain. No nausea, vomiting, No diarrhea or constipation.  GENITOURINARY: No dysuria, frequency, hematuria, or incontinence  NEUROLOGICAL: No headaches, memory loss, loss of strength, numbness, or tremors  SKIN: No itching, burning, rashes, or lesions   MUSCULOSKELETAL: No joint pain or swelling; No muscle, back, or extremity pain      PHYSICAL EXAM:  GENERAL: NAD, well-groomed, well-developed  HEAD:  Atraumatic, Normocephalic  EYES: EOMI, conjunctiva and sclera clear  ENMT: +wax impaction in left ear. Moist mucous membranes, No lesions  NECK: Supple, No JVD,   NERVOUS SYSTEM:  Alert & Oriented X3, Good concentration; Motor Strength 5/5 B/L upper and lower extremities  CHEST/LUNG:  auscultation bilaterally; No rales, rhonchi, wheezing,   HEART: Regular rate and rhythm; No murmurs, no tachy   ABDOMEN: Soft, Nontender, Nondistended; Bowel sounds present  EXTREMITIES:  2+ Peripheral Pulses, No clubbing, cyanosis, or edema  SKIN: No rashes or lesions        MEDICATIONS  (STANDING):  amoxicillin  875 milliGRAM(s)/clavulanate 1 Tablet(s) Oral two times a day  aspirin 325 milliGRAM(s) Oral daily  atorvastatin 80 milliGRAM(s) Oral at bedtime  carbamide peroxide Otic Solution 1 Drop(s) Left Ear two times a day  dextrose 5%. 1000 milliLiter(s) (50 mL/Hr) IV Continuous <Continuous>  dextrose 50% Injectable 12.5 Gram(s) IV Push once  dextrose 50% Injectable 25 Gram(s) IV Push once  dextrose 50% Injectable 25 Gram(s) IV Push once  insulin lispro (HumaLOG) corrective regimen sliding scale   SubCutaneous three times a day before meals  insulin lispro (HumaLOG) corrective regimen sliding scale   SubCutaneous at bedtime  insulin lispro Injectable (HumaLOG) 3 Unit(s) SubCutaneous three times a day with meals  lisinopril 2.5 milliGRAM(s) Oral daily  meclizine 25 milliGRAM(s) Oral every 8 hours  metoprolol succinate ER 50 milliGRAM(s) Oral daily  sodium chloride 0.9%. 1000 milliLiter(s) (75 mL/Hr) IV Continuous <Continuous>    MEDICATIONS  (PRN):  dextrose 40% Gel 15 Gram(s) Oral once PRN Blood Glucose LESS THAN 70 milliGRAM(s)/deciliter  glucagon  Injectable 1 milliGRAM(s) IntraMuscular once PRN Glucose LESS THAN 70 milligrams/deciliter      LABS:                        16.9   13.41 )-----------( 217      ( 16 Nov 2019 07:03 )             49.0     11-16    141  |  108  |  35<H>  ----------------------------<  245<H>  3.4<L>   |  24  |  1.30    Ca    8.2<L>      16 Nov 2019 07:03          CAPILLARY BLOOD GLUCOSE      POCT Blood Glucose.: 227 mg/dL (17 Nov 2019 12:35)  POCT Blood Glucose.: 219 mg/dL (17 Nov 2019 07:59)  POCT Blood Glucose.: 224 mg/dL (16 Nov 2019 22:09)  POCT Blood Glucose.: 219 mg/dL (16 Nov 2019 17:07)              RADIOLOGY & ADDITIONAL TESTS:    Imaging Personally Reviewed:   no new test     Advance Directives:    full code   Palliative Care:  appropriate

## 2019-11-18 LAB
ANION GAP SERPL CALC-SCNC: 6 MMOL/L — SIGNIFICANT CHANGE UP (ref 5–17)
BASOPHILS # BLD AUTO: 0.04 K/UL — SIGNIFICANT CHANGE UP (ref 0–0.2)
BASOPHILS NFR BLD AUTO: 0.4 % — SIGNIFICANT CHANGE UP (ref 0–2)
BUN SERPL-MCNC: 35 MG/DL — HIGH (ref 7–23)
CALCIUM SERPL-MCNC: 8.9 MG/DL — SIGNIFICANT CHANGE UP (ref 8.5–10.1)
CHLORIDE SERPL-SCNC: 107 MMOL/L — SIGNIFICANT CHANGE UP (ref 96–108)
CO2 SERPL-SCNC: 26 MMOL/L — SIGNIFICANT CHANGE UP (ref 22–31)
CREAT SERPL-MCNC: 1.4 MG/DL — HIGH (ref 0.5–1.3)
EOSINOPHIL # BLD AUTO: 0.15 K/UL — SIGNIFICANT CHANGE UP (ref 0–0.5)
EOSINOPHIL NFR BLD AUTO: 1.4 % — SIGNIFICANT CHANGE UP (ref 0–6)
GLUCOSE SERPL-MCNC: 225 MG/DL — HIGH (ref 70–99)
HCT VFR BLD CALC: 49.1 % — SIGNIFICANT CHANGE UP (ref 39–50)
HGB BLD-MCNC: 17.4 G/DL — HIGH (ref 13–17)
IMM GRANULOCYTES NFR BLD AUTO: 0.8 % — SIGNIFICANT CHANGE UP (ref 0–1.5)
LYMPHOCYTES # BLD AUTO: 2.34 K/UL — SIGNIFICANT CHANGE UP (ref 1–3.3)
LYMPHOCYTES # BLD AUTO: 21.3 % — SIGNIFICANT CHANGE UP (ref 13–44)
MCHC RBC-ENTMCNC: 32.8 PG — SIGNIFICANT CHANGE UP (ref 27–34)
MCHC RBC-ENTMCNC: 35.4 GM/DL — SIGNIFICANT CHANGE UP (ref 32–36)
MCV RBC AUTO: 92.5 FL — SIGNIFICANT CHANGE UP (ref 80–100)
MONOCYTES # BLD AUTO: 0.81 K/UL — SIGNIFICANT CHANGE UP (ref 0–0.9)
MONOCYTES NFR BLD AUTO: 7.4 % — SIGNIFICANT CHANGE UP (ref 2–14)
NEUTROPHILS # BLD AUTO: 7.58 K/UL — HIGH (ref 1.8–7.4)
NEUTROPHILS NFR BLD AUTO: 68.7 % — SIGNIFICANT CHANGE UP (ref 43–77)
NRBC # BLD: 0 /100 WBCS — SIGNIFICANT CHANGE UP (ref 0–0)
PLATELET # BLD AUTO: 195 K/UL — SIGNIFICANT CHANGE UP (ref 150–400)
POTASSIUM SERPL-MCNC: 4 MMOL/L — SIGNIFICANT CHANGE UP (ref 3.5–5.3)
POTASSIUM SERPL-SCNC: 4 MMOL/L — SIGNIFICANT CHANGE UP (ref 3.5–5.3)
RBC # BLD: 5.31 M/UL — SIGNIFICANT CHANGE UP (ref 4.2–5.8)
RBC # FLD: 11.8 % — SIGNIFICANT CHANGE UP (ref 10.3–14.5)
SODIUM SERPL-SCNC: 139 MMOL/L — SIGNIFICANT CHANGE UP (ref 135–145)
WBC # BLD: 11.01 K/UL — HIGH (ref 3.8–10.5)
WBC # FLD AUTO: 11.01 K/UL — HIGH (ref 3.8–10.5)

## 2019-11-18 PROCEDURE — 70551 MRI BRAIN STEM W/O DYE: CPT | Mod: 26

## 2019-11-18 PROCEDURE — 99233 SBSQ HOSP IP/OBS HIGH 50: CPT | Mod: GC

## 2019-11-18 RX ORDER — INSULIN NPH HUM/REG INSULIN HM 70-30/ML
6 VIAL (ML) SUBCUTANEOUS
Qty: 27 | Refills: 0
Start: 2019-11-18 | End: 2019-12-17

## 2019-11-18 RX ORDER — ENOXAPARIN SODIUM 100 MG/ML
40 INJECTION SUBCUTANEOUS DAILY
Refills: 0 | Status: DISCONTINUED | OUTPATIENT
Start: 2019-11-18 | End: 2019-11-21

## 2019-11-18 RX ADMIN — Medication 4 UNIT(S): at 17:28

## 2019-11-18 RX ADMIN — Medication 2: at 12:16

## 2019-11-18 RX ADMIN — ATORVASTATIN CALCIUM 80 MILLIGRAM(S): 80 TABLET, FILM COATED ORAL at 21:25

## 2019-11-18 RX ADMIN — Medication 1 TABLET(S): at 17:30

## 2019-11-18 RX ADMIN — Medication 325 MILLIGRAM(S): at 12:18

## 2019-11-18 RX ADMIN — Medication 4: at 08:08

## 2019-11-18 RX ADMIN — Medication 37.5 MILLIGRAM(S): at 15:34

## 2019-11-18 RX ADMIN — Medication 1 TABLET(S): at 05:37

## 2019-11-18 RX ADMIN — CARBAMIDE PEROXIDE 1 DROP(S): 81.86 SOLUTION/ DROPS AURICULAR (OTIC) at 05:37

## 2019-11-18 RX ADMIN — Medication 50 MILLIGRAM(S): at 05:37

## 2019-11-18 RX ADMIN — LISINOPRIL 2.5 MILLIGRAM(S): 2.5 TABLET ORAL at 05:37

## 2019-11-18 RX ADMIN — CARBAMIDE PEROXIDE 1 DROP(S): 81.86 SOLUTION/ DROPS AURICULAR (OTIC) at 17:31

## 2019-11-18 RX ADMIN — Medication 4 UNIT(S): at 12:17

## 2019-11-18 RX ADMIN — Medication 2: at 17:27

## 2019-11-18 RX ADMIN — Medication 37.5 MILLIGRAM(S): at 21:25

## 2019-11-18 RX ADMIN — Medication 37.5 MILLIGRAM(S): at 05:37

## 2019-11-18 RX ADMIN — Medication 4 UNIT(S): at 08:09

## 2019-11-18 NOTE — PROGRESS NOTE ADULT - PROBLEM SELECTOR PLAN 4
HbA1c 9.5 - new dm type2 un controlled   no History of DM, patient denies family history , on Humalog dose adjusted   Moderate dose Insulin sliding scale, accuchecks, hypoglycemia protocol  - f/u DM NP consult

## 2019-11-18 NOTE — CHART NOTE - NSCHARTNOTEFT_GEN_A_CORE
Called by RN to evaluate patient who had purell accidentally splashed in his left eye. Patient seen and examined at bedside. Denies any pain or burning in the eye. RN states she flushed it out with normal saline for at least 15 minutes. Denies any blurred vision.    T(C): 36.7 (11-19-19 @ 23:48), Max: 37.1 (11-19-19 @ 19:58)  HR: 78 (11-19-19 @ 23:48) (72 - 90)  BP: 152/114 (11-19-19 @ 23:48) (134/92 - 156/90)  RR: 17 (11-19-19 @ 23:48) (16 - 20)  SpO2: 96% (11-19-19 @ 23:48) (93% - 96%)  Wt(kg): --    Physical Exam:  Gen: well appearing, NAD  HEENT: NCAT, PEERLA b/l, EOMI b/l, left eye with diffuse conjunctival erythema, no discharge  Cardio: regular rate and rhythm, +s1s2, no murmurs, rubs, or gallops  Pulm: CTA b/l, no wheezes, rales or rhonchi    Assessment/Plan  54M with PMH of HTN presents with dizziness for 4 days admitted for stroke-like symptoms and vertebral artery stenosis/occlusion  with new  subacute   vs acute  rt cerebellar  ischemic cva     1. Eye irritation  - mild diffuse conjunctival erythema in left eye due to irritation from purell  - no pain or difficulty opening eye, doubt any corneal abrasions, no need for topical antibiotics at this time  - eye was thoroughly irrigated with normal saline after incident by RN, no need for further irrigation  - will give artificial tear eye drops for affected eye  - RN to continue to monitor and call with any changes

## 2019-11-18 NOTE — PROGRESS NOTE ADULT - PROBLEM SELECTOR PLAN 3
likely 2/2 vertebral artery disease, could be BPPV   - continue Antivert for vertigo  - ENT consulted

## 2019-11-18 NOTE — PROGRESS NOTE ADULT - PROBLEM SELECTOR PLAN 1
Patient with persistent dizziness for 4days for prior  to admission - Antivert 37.5 mg tid.  -ruled out cva  and tia  by 1st mri    but persistent  dizziness and vertigo   so  repeated second mri   brain and mra   found  acute vs subacute rt cerebellar cva -  nih 2 ,   , Start ASA 325mg daily , neurocheck  , echo , dvt prophy , pt evaluation , lipid profile and hba1c done   -MRA head and neck shows vertebral artery calcifications and near complete stenosis  -MRI showing no acute disease, near complete occlusion likely congenital or chronic  -Hemoglobin A1C- 9.5  -TSH- 0.64  -Cholesterol- 310, LDL- 239, HDL- 49  -Speech and swallow evaluation- recommended regular solids with thin liquids

## 2019-11-18 NOTE — PROGRESS NOTE ADULT - PROBLEM SELECTOR PLAN 1
Patient with persistent dizziness for 4days- Antivert now on 37.5 mg tid.  -IVF started 75cc/hr x 12 hours(11/14). H/H mildly elevated likely due to hemoconcentration as patient reports vomiting and minimal intake today  -c/w ASA 325mg daily  -MRA head and neck shows vertebral artery calcifications and near complete stenosis  -MRI showing no acute disease, near complete occlusion likely congenital or chronic  -Hemoglobin A1C- 9.5  -TSH- 0.64  -Cholesterol- 310, LDL- 239, HDL- 49  -Speech and swallow evaluation- recommended regular solids with thin liquids  -F/U repeat MRI due to worsening gait  -PT Eval: ERMA

## 2019-11-18 NOTE — PROGRESS NOTE ADULT - SUBJECTIVE AND OBJECTIVE BOX
Patient is a 54y old  Male who presents with a chief complaint of possible stroke (18 Nov 2019 11:58)    HPI:  54M with PMH of HTN presents with dizziness for 4 days. States he feels like the room is spinning and has an unsteady gait. Reports intermittent dizziness for a 2 days but progressed to constant dizziness, he was unable to stand without holding unto objects near by. Patient walks to the [employer] for work daily and couldn't walk without reaching and leaning on trees. States similar brief episode of dizziness last week but was able to go to work and symptoms resolved after a minute. Patient works as a  in [employer], skins and cuts fish, also does heavy loading. States diet consists of "eat on the go take out," including 7/11, bagels, soda, ice tea, deli food and chinese food. Admits to two episodes of non-bloody, non-bilious emesis and was unable to keep food down today, prompting urgent care evaluation. Patient was given Meclizine at the urgent care and was advised ED eval if symptoms persisted. Denies chest pain, palpitations, SOB, FRENCH, abd pain, c/d. Denies ear pain, sore throat. States his form of exercise is walking, as patient does not have a car.       admitted for stroke-like symptoms and vertebral artery stenosis/occlusion.-    INTERVAL HPI/OVERNIGHT EVENTS: Pt seen and examined at bedside. Pt states he feels some improvement of his dizziness and unsteady gait since being admitted, but feels the same as he did yesterday. Admits to ongoing right ear decreased hearing that has not improved. Denies cp, sob, weakness, n/v, fevers/chills, headache. tolerating PO    MEDICATIONS  (STANDING):  amoxicillin  875 milliGRAM(s)/clavulanate 1 Tablet(s) Oral two times a day  aspirin 325 milliGRAM(s) Oral daily  atorvastatin 80 milliGRAM(s) Oral at bedtime  carbamide peroxide Otic Solution 1 Drop(s) Both Ears two times a day  dextrose 5%. 1000 milliLiter(s) (50 mL/Hr) IV Continuous <Continuous>  dextrose 50% Injectable 12.5 Gram(s) IV Push once  dextrose 50% Injectable 25 Gram(s) IV Push once  dextrose 50% Injectable 25 Gram(s) IV Push once  insulin lispro (HumaLOG) corrective regimen sliding scale   SubCutaneous three times a day before meals  insulin lispro (HumaLOG) corrective regimen sliding scale   SubCutaneous at bedtime  insulin lispro Injectable (HumaLOG) 4 Unit(s) SubCutaneous three times a day with meals  lisinopril 2.5 milliGRAM(s) Oral daily  meclizine 37.5 milliGRAM(s) Oral every 8 hours  metoprolol tartrate 50 milliGRAM(s) Oral two times a day  sodium chloride 0.9%. 1000 milliLiter(s) (75 mL/Hr) IV Continuous <Continuous>    MEDICATIONS  (PRN):  dextrose 40% Gel 15 Gram(s) Oral once PRN Blood Glucose LESS THAN 70 milliGRAM(s)/deciliter  glucagon  Injectable 1 milliGRAM(s) IntraMuscular once PRN Glucose LESS THAN 70 milligrams/deciliter      Allergies    No Known Allergies    Intolerances        REVIEW OF SYSTEMS:  CONSTITUTIONAL: No fever or chills  HEENT:  No headache, no sore throat  RESPIRATORY: No cough, wheezing, or shortness of breath  CARDIOVASCULAR: No chest pain, palpitations  GASTROINTESTINAL: No abd pain, nausea, vomiting, or diarrhea  GENITOURINARY: No dysuria, frequency, or hematuria  NEUROLOGICAL: no focal weakness or dizziness  MUSCULOSKELETAL: no myalgias     Vital Signs Last 24 Hrs  T(C): 36.3 (18 Nov 2019 11:24), Max: 36.8 (17 Nov 2019 16:02)  T(F): 97.3 (18 Nov 2019 11:24), Max: 98.3 (17 Nov 2019 16:02)  HR: 61 (18 Nov 2019 11:24) (61 - 72)  BP: 158/103 (18 Nov 2019 11:26) (140/96 - 158/104)  BP(mean): --  RR: 20 (18 Nov 2019 11:26) (18 - 20)  SpO2: 95% (18 Nov 2019 11:26) (94% - 95%)    PHYSICAL EXAM:  GENERAL: NAD  HEENT:  anicteric, moist mucous membranes  CHEST/LUNG:  CTA b/l, no rales, wheezes, or rhonchi  HEART:  RRR, S1, S2  ABDOMEN:  BS+, soft, nontender, nondistended  EXTREMITIES: no edema, cyanosis, or calf tenderness  NERVOUS SYSTEM: answers questions and follows commands appropriately    LABS:                        17.4   11.01 )-----------( 195      ( 18 Nov 2019 05:12 )             49.1     CBC Full  -  ( 18 Nov 2019 05:12 )  WBC Count : 11.01 K/uL  Hemoglobin : 17.4 g/dL  Hematocrit : 49.1 %  Platelet Count - Automated : 195 K/uL  Mean Cell Volume : 92.5 fl  Mean Cell Hemoglobin : 32.8 pg  Mean Cell Hemoglobin Concentration : 35.4 gm/dL  Auto Neutrophil # : 7.58 K/uL  Auto Lymphocyte # : 2.34 K/uL  Auto Monocyte # : 0.81 K/uL  Auto Eosinophil # : 0.15 K/uL  Auto Basophil # : 0.04 K/uL  Auto Neutrophil % : 68.7 %  Auto Lymphocyte % : 21.3 %  Auto Monocyte % : 7.4 %  Auto Eosinophil % : 1.4 %  Auto Basophil % : 0.4 %    18 Nov 2019 05:12    139    |  107    |  35     ----------------------------<  225    4.0     |  26     |  1.40     Ca    8.9        18 Nov 2019 05:12          CAPILLARY BLOOD GLUCOSE      POCT Blood Glucose.: 168 mg/dL (18 Nov 2019 11:57)  POCT Blood Glucose.: 208 mg/dL (18 Nov 2019 07:57)  POCT Blood Glucose.: 242 mg/dL (17 Nov 2019 21:41)  POCT Blood Glucose.: 154 mg/dL (17 Nov 2019 16:43)          RADIOLOGY & ADDITIONAL TESTS:    Personally reviewed.     Consultant(s) Notes Reviewed:  [x] YES  [ ] NO Patient is a 54y old  Male who presents with a chief complaint of possible stroke (18 Nov 2019 11:58)    HPI:  54M with PMH of HTN presents with dizziness for 4 days. States he feels like the room is spinning and has an unsteady gait. Reports intermittent dizziness for a 2 days but progressed to constant dizziness, he was unable to stand without holding unto objects near by. [facility] and couldn't walk without reaching and leaning on trees. States similar brief episode of dizziness last week but was able to go to work and symptoms resolved after a minute. Patient works as a  in [employer], skins and cuts fish, also does heavy loading. States diet consists of "eat on the go take out," including 7/11, bagels, soda, ice tea, deli food and chinese food. Admits to two episodes of non-bloody, non-bilious emesis and was unable to keep food down today, prompting urgent care evaluation. Patient was given Meclizine at the urgent care and was advised ED eval if symptoms persisted. Denies chest pain, palpitations, SOB, FRENCH, abd pain, c/d. Denies ear pain, sore throat. States his form of exercise is walking, as patient does not have a car.       admitted for stroke-like symptoms and vertebral artery stenosis/occlusion.-    INTERVAL HPI/OVERNIGHT EVENTS: Pt seen and examined at bedside. Pt states he feels some improvement of his dizziness and unsteady gait since being admitted, but feels the same as he did yesterday. Admits to ongoing right ear decreased hearing that has not improved. Gait unstable. Denies cp, sob, weakness, n/v, fevers/chills, headache. Tolerating PO    MEDICATIONS  (STANDING):  amoxicillin  875 milliGRAM(s)/clavulanate 1 Tablet(s) Oral two times a day  aspirin 325 milliGRAM(s) Oral daily  atorvastatin 80 milliGRAM(s) Oral at bedtime  carbamide peroxide Otic Solution 1 Drop(s) Both Ears two times a day  dextrose 5%. 1000 milliLiter(s) (50 mL/Hr) IV Continuous <Continuous>  dextrose 50% Injectable 12.5 Gram(s) IV Push once  dextrose 50% Injectable 25 Gram(s) IV Push once  dextrose 50% Injectable 25 Gram(s) IV Push once  insulin lispro (HumaLOG) corrective regimen sliding scale   SubCutaneous three times a day before meals  insulin lispro (HumaLOG) corrective regimen sliding scale   SubCutaneous at bedtime  insulin lispro Injectable (HumaLOG) 4 Unit(s) SubCutaneous three times a day with meals  lisinopril 2.5 milliGRAM(s) Oral daily  meclizine 37.5 milliGRAM(s) Oral every 8 hours  metoprolol tartrate 50 milliGRAM(s) Oral two times a day  sodium chloride 0.9%. 1000 milliLiter(s) (75 mL/Hr) IV Continuous <Continuous>    MEDICATIONS  (PRN):  dextrose 40% Gel 15 Gram(s) Oral once PRN Blood Glucose LESS THAN 70 milliGRAM(s)/deciliter  glucagon  Injectable 1 milliGRAM(s) IntraMuscular once PRN Glucose LESS THAN 70 milligrams/deciliter      Allergies    No Known Allergies    Intolerances        REVIEW OF SYSTEMS:  CONSTITUTIONAL: No fever or chills  HEENT:  No headache, no sore throat, admits continuing right ear hearing loss  RESPIRATORY: No cough, wheezing, or shortness of breath  CARDIOVASCULAR: No chest pain, palpitations  GASTROINTESTINAL: No abd pain, nausea, vomiting, or diarrhea  GENITOURINARY: No dysuria, frequency, or hematuria  NEUROLOGICAL: admits dizziness and unstable gait, denies focal weakness  MUSCULOSKELETAL: no myalgias     Vital Signs Last 24 Hrs  T(C): 36.3 (18 Nov 2019 11:24), Max: 36.8 (17 Nov 2019 16:02)  T(F): 97.3 (18 Nov 2019 11:24), Max: 98.3 (17 Nov 2019 16:02)  HR: 61 (18 Nov 2019 11:24) (61 - 72)  BP: 158/103 (18 Nov 2019 11:26) (140/96 - 158/104)  BP(mean): --  RR: 20 (18 Nov 2019 11:26) (18 - 20)  SpO2: 95% (18 Nov 2019 11:26) (94% - 95%)    PHYSICAL EXAM:  GENERAL: NAD  HEENT:  anicteric, moist mucous membranes  CHEST/LUNG:  CTA b/l, no rales, wheezes, or rhonchi  HEART:  RRR, S1, S2  ABDOMEN:  BS+, soft, nontender, nondistended  EXTREMITIES: no edema, cyanosis, or calf tenderness  NERVOUS SYSTEM: CNII-XII grossly intact; Muscle strength 5/5 UE and LE b/l, answers questions and follows commands appropriately    LABS:                        17.4   11.01 )-----------( 195      ( 18 Nov 2019 05:12 )             49.1     CBC Full  -  ( 18 Nov 2019 05:12 )  WBC Count : 11.01 K/uL  Hemoglobin : 17.4 g/dL  Hematocrit : 49.1 %  Platelet Count - Automated : 195 K/uL  Mean Cell Volume : 92.5 fl  Mean Cell Hemoglobin : 32.8 pg  Mean Cell Hemoglobin Concentration : 35.4 gm/dL  Auto Neutrophil # : 7.58 K/uL  Auto Lymphocyte # : 2.34 K/uL  Auto Monocyte # : 0.81 K/uL  Auto Eosinophil # : 0.15 K/uL  Auto Basophil # : 0.04 K/uL  Auto Neutrophil % : 68.7 %  Auto Lymphocyte % : 21.3 %  Auto Monocyte % : 7.4 %  Auto Eosinophil % : 1.4 %  Auto Basophil % : 0.4 %    18 Nov 2019 05:12    139    |  107    |  35     ----------------------------<  225    4.0     |  26     |  1.40     Ca    8.9        18 Nov 2019 05:12          CAPILLARY BLOOD GLUCOSE      POCT Blood Glucose.: 168 mg/dL (18 Nov 2019 11:57)  POCT Blood Glucose.: 208 mg/dL (18 Nov 2019 07:57)  POCT Blood Glucose.: 242 mg/dL (17 Nov 2019 21:41)  POCT Blood Glucose.: 154 mg/dL (17 Nov 2019 16:43)          RADIOLOGY & ADDITIONAL TESTS:    Personally reviewed.     Consultant(s) Notes Reviewed:  [x] YES  [ ] NO

## 2019-11-18 NOTE — DIETITIAN INITIAL EVALUATION ADULT. - ADD RECOMMEND
Pt provided with written and verbal nutrition education on heart healthy consistent carbohydrate diet. Topics discussed include: which foods contain carbohydrates, reading food labels, inclusion of whole grains and fiber, plate method, checking fingersticks regularly with goal  mg/dl, and limiting concentrated sweets. Pt with poor teach back at this time given newly diagnosed DM. Could benefit from working with Diabetes NP in house as needs extensive teaching. Pt provided with written and verbal nutrition education on heart healthy consistent carbohydrate diet. Topics discussed include: which foods contain carbohydrates, reading food labels, inclusion of whole grains and fiber, plate method, checking fingersticks regularly with goal  mg/dl, and limiting concentrated sweets. Pt with poor teach back at this time given newly diagnosed DM. Could benefit from working with Diabetes NP in house as needs extensive teaching. MD Thompson made aware.

## 2019-11-18 NOTE — DIETITIAN INITIAL EVALUATION ADULT. - ENERGY NEEDS
Wt: 185 pounds, Ht: 66 inches, BMI: 29.9 kg/m2, IBW: 142 pounds  +/-10%, %IBW: 130%  no edema or pressure injuries

## 2019-11-18 NOTE — PROGRESS NOTE ADULT - SUBJECTIVE AND OBJECTIVE BOX
Neurology Follow up note    ERASTO [id number]    HPI:  54M with PMH of HTN presents with dizziness for 4 days. States he feels like the room is spinning and has an unsteady gait. Reports intermittent dizziness for a 2 days but progressed to constant dizziness, he was unable to stand without holding unto objects near by. Patient walks to the [facility] for work daily and couldn't walk without reaching and leaning on trees. States similar brief episode of dizziness last week but was able to go to work and symptoms resolved after a minute. Patient works as a  in [employer], skins and cuts fish, also does heavy loading. States diet consists of "eat on the go take out," including 7/11, bagels, soda, ice tea, deli food and chinese food. Admits to two episodes of non-bloody, non-bilious emesis and was unable to keep food down today, prompting urgent care evaluation. Patient was given Meclizine at the urgent care and was advised ED eval if symptoms persisted. Denies chest pain, palpitations, SOB, FRENCH, abd pain, c/d. Denies ear pain, sore throat. States his form of exercise is walking, as patient does not have a car.     In the ED: Temp 98.1, HR 88, /102, RR 16, SpO2 100% RA.   H/H: 18.4/51.6, glucose 267.   CT head: No acute hemorrhage or mass effect.  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  NIH Stroke Scale: 0, EKG: NSR 94  Received ASA 325mg, Augmentin x1, 1L NS bolus, Valium 5mg x1, Meclizine 25mg x1, Solu-Medrol 125mg IV x1. (14 Nov 2019 22:53)      Interval History - I am still dizzy.    Patient is seen, chart was reviewed and case was discussed with the treatment team.  Pt is not in any distress.   Lying on bed comfortably.   No events reported overnight.   No clinical seizure was reported.  Sitting on chair bed comfortably.    is at bedside.    Vital Signs Last 24 Hrs  T(C): 36.3 (18 Nov 2019 11:24), Max: 36.8 (17 Nov 2019 16:02)  T(F): 97.3 (18 Nov 2019 11:24), Max: 98.3 (17 Nov 2019 16:02)  HR: 61 (18 Nov 2019 11:24) (61 - 72)  BP: 158/103 (18 Nov 2019 11:26) (140/96 - 158/104)  BP(mean): --  RR: 20 (18 Nov 2019 11:26) (18 - 20)  SpO2: 95% (18 Nov 2019 11:26) (94% - 95%)        REVIEW OF SYSTEMS:    Constitutional: No fever, weight loss or fatigue  Eyes: No eye pain, visual disturbances, or discharge  ENT:  No sinus or throat pain  Neck: No pain or stiffness  Respiratory: No cough, wheezing, chills or hemoptysis  Cardiovascular: No chest pain, palpitations,   Gastrointestinal: No abdominal or epigastric pain.   Genitourinary: No dysuria, frequency, hematuria or incontinence  Neurological: No headaches, memory loss, loss of strength,  Psychiatric: No depression, anxiety, mood swings or difficulty sleeping  Musculoskeletal: No joint pain or swelling;   Skin: No itching, burning, rashes or lesions   Lymph Nodes: No enlarged glands  Endocrine: No heat or cold intolerance;   Allergy and Immunologic: No hives or eczema    On Neurological Examination:    Mental Status - Pt is alert, awake, oriented X3. Follows commands well and able to answer questions appropriately  .Mood and affect  normal    Speech -  Normal.     Cranial Nerves - Pupils 3 mm equal and reactive to light, extraocular eye movements intact.   left beating horizontal.  Pt has no facial asymmetry. Facial sensation is intact.  Tongue - is in midline.    Muscle tone - is normal     Motor Exam - UE; 5/5      Sensory Exam - Pt withdraws all extremities equally on stimulation. No asymmetry seen. No complaints of tingling, numbness.    Gait - Able to stand and walk unassisted.        coordination:    Finger to nose: normal    Deep tendon Reflexes - 2 plus all over.         Neck Supple -  Yes.     MEDICATIONS    amoxicillin  875 milliGRAM(s)/clavulanate 1 Tablet(s) Oral two times a day  aspirin 325 milliGRAM(s) Oral daily  atorvastatin 80 milliGRAM(s) Oral at bedtime  carbamide peroxide Otic Solution 1 Drop(s) Both Ears two times a day  dextrose 40% Gel 15 Gram(s) Oral once PRN  dextrose 5%. 1000 milliLiter(s) IV Continuous <Continuous>  dextrose 50% Injectable 12.5 Gram(s) IV Push once  dextrose 50% Injectable 25 Gram(s) IV Push once  dextrose 50% Injectable 25 Gram(s) IV Push once  glucagon  Injectable 1 milliGRAM(s) IntraMuscular once PRN  insulin lispro (HumaLOG) corrective regimen sliding scale   SubCutaneous three times a day before meals  insulin lispro (HumaLOG) corrective regimen sliding scale   SubCutaneous at bedtime  insulin lispro Injectable (HumaLOG) 4 Unit(s) SubCutaneous three times a day with meals  lisinopril 2.5 milliGRAM(s) Oral daily  meclizine 37.5 milliGRAM(s) Oral every 8 hours  metoprolol tartrate 50 milliGRAM(s) Oral two times a day  sodium chloride 0.9%. 1000 milliLiter(s) IV Continuous <Continuous>      Allergies    No Known Allergies    Intolerances        LABS:  CBC Full  -  ( 18 Nov 2019 05:12 )  WBC Count : 11.01 K/uL  RBC Count : 5.31 M/uL  Hemoglobin : 17.4 g/dL  Hematocrit : 49.1 %  Platelet Count - Automated : 195 K/uL  Mean Cell Volume : 92.5 fl  Mean Cell Hemoglobin : 32.8 pg  Mean Cell Hemoglobin Concentration : 35.4 gm/dL  Auto Neutrophil # : 7.58 K/uL  Auto Lymphocyte # : 2.34 K/uL        11-18    139  |  107  |  35<H>  ----------------------------<  225<H>  4.0   |  26  |  1.40<H>    Ca    8.9      18 Nov 2019 05:12      Hemoglobin A1C:     Vitamin B12     RADIOLOGY    ASSESSMENT AND PLAN:      presented  with vertigo/ataxic gait;  brain mri reported normal, however there is restricted diffusion involving   right cerebellum cw subacute infarct which can cause above sx.  occlusion of right V4      would repeat brain mri  continue ap/statin.  Physical therapy evaluation.  Pain is accessed and addressed.  Would continue to follow.

## 2019-11-18 NOTE — ADVANCED PRACTICE NURSE CONSULT - ASSESSMENT
Vital Signs Last 24 Hrs  T(C): 36.5 (18 Nov 2019 15:45), Max: 36.8 (17 Nov 2019 23:59)  T(F): 97.7 (18 Nov 2019 15:45), Max: 98.3 (17 Nov 2019 23:59)  HR: 65 (18 Nov 2019 15:45) (61 - 71)  BP: 150/107 (18 Nov 2019 15:45) (140/96 - 158/104)  BP(mean): --  RR: 19 (18 Nov 2019 15:45) (18 - 20)  SpO2: 96% (18 Nov 2019 15:45) (94% - 96%)    Hemoglobin A1C, Whole Blood: 9.5 % (11-15-19 @ 08:34)   eGFR if Non African American: 57 mL/min/1.73M2 (11-18-19 @ 05:12)  eGFR if African American: 66 mL/min/1.73M2 (11-18-19 @ 05:12)  eGFR if Non African American: 62 mL/min/1.73M2 (11-16-19 @ 07:03)  eGFR if African American: 72 mL/min/1.73M2 (11-16-19 @ 07:03)      CAPILLARY BLOOD GLUCOSE      POCT Blood Glucose.: 168 mg/dL (18 Nov 2019 11:57)  POCT Blood Glucose.: 208 mg/dL (18 Nov 2019 07:57)  POCT Blood Glucose.: 242 mg/dL (17 Nov 2019 21:41)      DIET: CC  %

## 2019-11-18 NOTE — DIETITIAN INITIAL EVALUATION ADULT. - OTHER INFO
54 year old male with PMH of HTN admitted for r/o CVA. Found to have undiagnosed type 2 DM, plan to start novolog and metformin upon d/c.    Pt diet recall as follows - whatever I can grab on the run. After much probing, states breakfast is usually sauer/egg sandwich from 7/11 and lunch is candy bar, and dinner is takeout food - pizza, chinese. Beverage consumption is typically juice, soda, or iced tea.   pounds, no change recently. Denied N/V/diarrhea/constipation, last BM yesterday. No difficulties chewing/swallowing, seen by SLP on this admission with regular diet recommended.

## 2019-11-18 NOTE — ADVANCED PRACTICE NURSE CONSULT - RECOMMEDATIONS
Type 2 A1c 9.5% s/p CVA  FU appt: [facility]NOLAN DSC plan to [facility]  Diabetes wellness program info given- will reinforce  Diabetes support group info given  Goal 100-180 mg/dL

## 2019-11-18 NOTE — ADVANCED PRACTICE NURSE CONSULT - REASON FOR CONSULT
54y    Male    Patient is a 54y old  Male who presents with a chief complaint of possible stroke (18 Nov 2019 14:09)      Type: 2  newly DX. Never been told has diabetes. Adm. with CVA- Has a cardiologist in [place]- last seen 2 mos. ago.     HPI:  54M with PMH of HTN presents with dizziness for 4 days. States he feels like the room is spinning and has an unsteady gait. Reports intermittent dizziness for a 2 days but progressed to constant dizziness, he was unable to stand without holding unto objects near by. Patient walks to the [facility] for work daily and couldn't walk without reaching and leaning on trees. States similar brief episode of dizziness last week but was able to go to work and symptoms resolved after a minute. Patient works as a  in [employer], skins and cuts fish, also does heavy loading. States diet consists of "eat on the go take out," including 7/11, bagels, soda, ice tea, deli food and chinese food. Admits to two episodes of non-bloody, non-bilious emesis and was unable to keep food down today, prompting urgent care evaluation. Patient was given Meclizine at the urgent care and was advised ED eval if symptoms persisted. Denies chest pain, palpitations, SOB, FRENCH, abd pain, c/d. Denies ear pain, sore throat. States his form of exercise is walking, as patient does not have a car.     In the ED: Temp 98.1, HR 88, /102, RR 16, SpO2 100% RA.   H/H: 18.4/51.6, glucose 267.   CT head: No acute hemorrhage or mass effect.  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  NIH Stroke Scale: 0, EKG: NSR 94  Received ASA 325mg, Augmentin x1, 1L NS bolus, Valium 5mg x1, Meclizine 25mg x1, Solu-Medrol 125mg IV x1. (14 Nov 2019 22:53)      PAST MEDICAL & SURGICAL HISTORY:  HTN (hypertension)  No significant past surgical history      MEDICATIONS  (STANDING):  amoxicillin  875 milliGRAM(s)/clavulanate 1 Tablet(s) Oral two times a day  aspirin 325 milliGRAM(s) Oral daily  atorvastatin 80 milliGRAM(s) Oral at bedtime  carbamide peroxide Otic Solution 1 Drop(s) Both Ears two times a day  dextrose 5%. 1000 milliLiter(s) (50 mL/Hr) IV Continuous <Continuous>  dextrose 50% Injectable 12.5 Gram(s) IV Push once  dextrose 50% Injectable 25 Gram(s) IV Push once  dextrose 50% Injectable 25 Gram(s) IV Push once  enoxaparin Injectable 40 milliGRAM(s) SubCutaneous daily  insulin lispro (HumaLOG) corrective regimen sliding scale   SubCutaneous three times a day before meals  insulin lispro (HumaLOG) corrective regimen sliding scale   SubCutaneous at bedtime  insulin lispro Injectable (HumaLOG) 4 Unit(s) SubCutaneous three times a day with meals  meclizine 37.5 milliGRAM(s) Oral every 8 hours  sodium chloride 0.9%. 1000 milliLiter(s) (75 mL/Hr) IV Continuous <Continuous>

## 2019-11-18 NOTE — DIETITIAN INITIAL EVALUATION ADULT. - PROBLEM SELECTOR PLAN 4
chronic, stable  -Allow for permissive hypertension, hold home medications metoprolol and amlodipine

## 2019-11-18 NOTE — PROGRESS NOTE ADULT - ASSESSMENT
54M with PMH of HTN presents with dizziness for 4 days admitted for stroke-like symptoms and vertebral artery stenosis/occlusion  with new  subacute   vs acute  rt cerebellar  ischemic  cva .

## 2019-11-18 NOTE — DIETITIAN INITIAL EVALUATION ADULT. - PROBLEM SELECTOR PLAN 3
Patient admits to R ear fullness. R tympanic membrane bulging and red   -s/p 1 dose of Augmentin in the ED. Continue Augmentin BID x 7 days

## 2019-11-18 NOTE — PROGRESS NOTE ADULT - SUBJECTIVE AND OBJECTIVE BOX
Patient is a 54y old  Male who presents with a chief complaint of possible stroke (18 Nov 2019 13:09)      HPI:  54M with PMH of HTN presents with dizziness for 4 days. States he feels like the room is spinning and has an unsteady gait. Reports intermittent dizziness for a 2 days but progressed to constant dizziness, he was unable to stand without holding unto objects near by. [facility] and couldn't walk without reaching and leaning on trees. States similar brief episode of dizziness last week but was able to go to work and symptoms resolved after a minute. Patient works as a  in [employer], skins and cuts fish, also does heavy loading. States diet consists of "eat on the go take out," including 7/11, bagels, soda, ice tea, deli food and chinese food. Admits to two episodes of non-bloody, non-bilious emesis and was unable to keep food down today, prompting urgent care evaluation. Patient was given Meclizine at the urgent care and was advised ED eval if symptoms persisted. Denies chest pain, palpitations, SOB, FRENCH,    admitted for stroke-like symptoms and vertebral artery stenosis/occlusion  now acute vs subacute cva .-     INTERVAL HPI/OVERNIGHT EVENTS:  . Pt was seen and examined at bedside.  alert awake  but   unsteady gait  noticed  Pt still reports feeling     dizzy  +  ,  no headache  no distress , tolerating po .     INTERVAL HPI/OVERNIGHT EVENTS:  T(C): 36.3 (11-18-19 @ 11:24), Max: 36.8 (11-17-19 @ 16:02)  HR: 61 (11-18-19 @ 11:24) (61 - 72)  BP: 158/103 (11-18-19 @ 11:26) (140/96 - 158/104)  RR: 20 (11-18-19 @ 11:26) (18 - 20)  SpO2: 95% (11-18-19 @ 11:26) (94% - 95%)  Wt(kg): --  I&O's Summary        REVIEW OF SYSTEMS:  CONSTITUTIONAL: + dizziness. No fever, weight loss, or fatigue  EYES: No eye pain, visual disturbances, or discharge  ENMT:  +ear fullness, No difficulty hearing, tinnitus, +  vertigo; No sinus or throat pain  NECK: No pain or stiffness  RESPIRATORY: No cough, wheezing, chills ; No shortness of breath  CARDIOVASCULAR: No chest pain, palpitations,   + dizziness, no leg swelling  GASTROINTESTINAL: No abdominal or epigastric pain. No nausea, vomiting, No diarrhea or constipation.  GENITOURINARY: No dysuria, frequency, hematuria, or incontinence  NEUROLOGICAL: No headaches, memory loss, loss of strength, numbness,   SKIN: No itching, burning, rashes, or lesions   MUSCULOSKELETAL: No joint pain or swelling; No muscle, back, or extremity pain      PHYSICAL EXAM:  GENERAL: NAD, well-groomed, well-developed  HEAD:  Atraumatic, Normocephalic  EYES: EOMI, conjunctiva and sclera clear  ENMT: +wax impaction in left ear. Moist mucous membranes,  NECK: Supple, No JVD,   NERVOUS SYSTEM:  Alert & Oriented X3, Good concentration; Motor Strength 5/5 B/L upper and lower extremities  CHEST/LUNG:   bilaterally; No rales, rhonchi, wheezing,   HEART: Regular rate and rhythm; No murmurs, no tachy   ABDOMEN: Soft, Nontender, Nondistended; Bowel sounds present  EXTREMITIES:  2+ Peripheral Pulses, No clubbing, cyanosis, or edema  SKIN: No rashes or lesions        MEDICATIONS  (STANDING):  amoxicillin  875 milliGRAM(s)/clavulanate 1 Tablet(s) Oral two times a day  aspirin 325 milliGRAM(s) Oral daily  atorvastatin 80 milliGRAM(s) Oral at bedtime  carbamide peroxide Otic Solution 1 Drop(s) Both Ears two times a day  dextrose 5%. 1000 milliLiter(s) (50 mL/Hr) IV Continuous <Continuous>  dextrose 50% Injectable 12.5 Gram(s) IV Push once  dextrose 50% Injectable 25 Gram(s) IV Push once  dextrose 50% Injectable 25 Gram(s) IV Push once  insulin lispro (HumaLOG) corrective regimen sliding scale   SubCutaneous three times a day before meals  insulin lispro (HumaLOG) corrective regimen sliding scale   SubCutaneous at bedtime  insulin lispro Injectable (HumaLOG) 4 Unit(s) SubCutaneous three times a day with meals  meclizine 37.5 milliGRAM(s) Oral every 8 hours  sodium chloride 0.9%. 1000 milliLiter(s) (75 mL/Hr) IV Continuous <Continuous>    MEDICATIONS  (PRN):  dextrose 40% Gel 15 Gram(s) Oral once PRN Blood Glucose LESS THAN 70 milliGRAM(s)/deciliter  glucagon  Injectable 1 milliGRAM(s) IntraMuscular once PRN Glucose LESS THAN 70 milligrams/deciliter      LABS:                        17.4   11.01 )-----------( 195      ( 18 Nov 2019 05:12 )             49.1     11-18    139  |  107  |  35<H>  ----------------------------<  225<H>  4.0   |  26  |  1.40<H>    Ca    8.9      18 Nov 2019 05:12          CAPILLARY BLOOD GLUCOSE      POCT Blood Glucose.: 168 mg/dL (18 Nov 2019 11:57)  POCT Blood Glucose.: 208 mg/dL (18 Nov 2019 07:57)  POCT Blood Glucose.: 242 mg/dL (17 Nov 2019 21:41)  POCT Blood Glucose.: 154 mg/dL (17 Nov 2019 16:43)              RADIOLOGY & ADDITIONAL TESTS:    Imaging Personally Reviewed:     mri brain pending   Advance Directives:    full code   Palliative Care:

## 2019-11-19 LAB
ANION GAP SERPL CALC-SCNC: 6 MMOL/L — SIGNIFICANT CHANGE UP (ref 5–17)
BASOPHILS # BLD AUTO: 0.05 K/UL — SIGNIFICANT CHANGE UP (ref 0–0.2)
BASOPHILS NFR BLD AUTO: 0.5 % — SIGNIFICANT CHANGE UP (ref 0–2)
BUN SERPL-MCNC: 34 MG/DL — HIGH (ref 7–23)
CALCIUM SERPL-MCNC: 8.6 MG/DL — SIGNIFICANT CHANGE UP (ref 8.5–10.1)
CHLORIDE SERPL-SCNC: 106 MMOL/L — SIGNIFICANT CHANGE UP (ref 96–108)
CO2 SERPL-SCNC: 25 MMOL/L — SIGNIFICANT CHANGE UP (ref 22–31)
CREAT SERPL-MCNC: 1.4 MG/DL — HIGH (ref 0.5–1.3)
EOSINOPHIL # BLD AUTO: 0.19 K/UL — SIGNIFICANT CHANGE UP (ref 0–0.5)
EOSINOPHIL NFR BLD AUTO: 1.8 % — SIGNIFICANT CHANGE UP (ref 0–6)
GLUCOSE SERPL-MCNC: 202 MG/DL — HIGH (ref 70–99)
HCT VFR BLD CALC: 50.3 % — HIGH (ref 39–50)
HGB BLD-MCNC: 17.8 G/DL — HIGH (ref 13–17)
IMM GRANULOCYTES NFR BLD AUTO: 0.8 % — SIGNIFICANT CHANGE UP (ref 0–1.5)
LYMPHOCYTES # BLD AUTO: 1.92 K/UL — SIGNIFICANT CHANGE UP (ref 1–3.3)
LYMPHOCYTES # BLD AUTO: 18 % — SIGNIFICANT CHANGE UP (ref 13–44)
MCHC RBC-ENTMCNC: 32.5 PG — SIGNIFICANT CHANGE UP (ref 27–34)
MCHC RBC-ENTMCNC: 35.4 GM/DL — SIGNIFICANT CHANGE UP (ref 32–36)
MCV RBC AUTO: 91.8 FL — SIGNIFICANT CHANGE UP (ref 80–100)
MONOCYTES # BLD AUTO: 0.73 K/UL — SIGNIFICANT CHANGE UP (ref 0–0.9)
MONOCYTES NFR BLD AUTO: 6.9 % — SIGNIFICANT CHANGE UP (ref 2–14)
NEUTROPHILS # BLD AUTO: 7.68 K/UL — HIGH (ref 1.8–7.4)
NEUTROPHILS NFR BLD AUTO: 72 % — SIGNIFICANT CHANGE UP (ref 43–77)
NRBC # BLD: 0 /100 WBCS — SIGNIFICANT CHANGE UP (ref 0–0)
PLATELET # BLD AUTO: 200 K/UL — SIGNIFICANT CHANGE UP (ref 150–400)
POTASSIUM SERPL-MCNC: 3.9 MMOL/L — SIGNIFICANT CHANGE UP (ref 3.5–5.3)
POTASSIUM SERPL-SCNC: 3.9 MMOL/L — SIGNIFICANT CHANGE UP (ref 3.5–5.3)
RBC # BLD: 5.48 M/UL — SIGNIFICANT CHANGE UP (ref 4.2–5.8)
RBC # FLD: 11.9 % — SIGNIFICANT CHANGE UP (ref 10.3–14.5)
SODIUM SERPL-SCNC: 137 MMOL/L — SIGNIFICANT CHANGE UP (ref 135–145)
WBC # BLD: 10.65 K/UL — HIGH (ref 3.8–10.5)
WBC # FLD AUTO: 10.65 K/UL — HIGH (ref 3.8–10.5)

## 2019-11-19 PROCEDURE — 99222 1ST HOSP IP/OBS MODERATE 55: CPT

## 2019-11-19 PROCEDURE — 93306 TTE W/DOPPLER COMPLETE: CPT | Mod: 26

## 2019-11-19 PROCEDURE — 99233 SBSQ HOSP IP/OBS HIGH 50: CPT | Mod: GC

## 2019-11-19 RX ORDER — MECLIZINE HCL 12.5 MG
3 TABLET ORAL
Qty: 0 | Refills: 0 | DISCHARGE
Start: 2019-11-19

## 2019-11-19 RX ORDER — SODIUM CHLORIDE 9 MG/ML
500 INJECTION INTRAMUSCULAR; INTRAVENOUS; SUBCUTANEOUS
Refills: 0 | Status: DISCONTINUED | OUTPATIENT
Start: 2019-11-19 | End: 2019-11-19

## 2019-11-19 RX ORDER — SODIUM CHLORIDE 9 MG/ML
1000 INJECTION INTRAMUSCULAR; INTRAVENOUS; SUBCUTANEOUS
Refills: 0 | Status: DISCONTINUED | OUTPATIENT
Start: 2019-11-19 | End: 2019-11-21

## 2019-11-19 RX ORDER — ASPIRIN/CALCIUM CARB/MAGNESIUM 324 MG
1 TABLET ORAL
Qty: 0 | Refills: 0 | DISCHARGE
Start: 2019-11-19

## 2019-11-19 RX ORDER — SODIUM CHLORIDE 9 MG/ML
500 INJECTION INTRAMUSCULAR; INTRAVENOUS; SUBCUTANEOUS ONCE
Refills: 0 | Status: COMPLETED | OUTPATIENT
Start: 2019-11-19 | End: 2019-11-19

## 2019-11-19 RX ORDER — ATORVASTATIN CALCIUM 80 MG/1
1 TABLET, FILM COATED ORAL
Qty: 0 | Refills: 0 | DISCHARGE
Start: 2019-11-19

## 2019-11-19 RX ADMIN — CARBAMIDE PEROXIDE 1 DROP(S): 81.86 SOLUTION/ DROPS AURICULAR (OTIC) at 18:04

## 2019-11-19 RX ADMIN — Medication 1 TABLET(S): at 05:27

## 2019-11-19 RX ADMIN — Medication 4 UNIT(S): at 12:54

## 2019-11-19 RX ADMIN — SODIUM CHLORIDE 500 MILLILITER(S): 9 INJECTION INTRAMUSCULAR; INTRAVENOUS; SUBCUTANEOUS at 10:59

## 2019-11-19 RX ADMIN — Medication 325 MILLIGRAM(S): at 12:53

## 2019-11-19 RX ADMIN — Medication 37.5 MILLIGRAM(S): at 13:30

## 2019-11-19 RX ADMIN — ENOXAPARIN SODIUM 40 MILLIGRAM(S): 100 INJECTION SUBCUTANEOUS at 12:56

## 2019-11-19 RX ADMIN — Medication 1 DROP(S): at 22:50

## 2019-11-19 RX ADMIN — Medication 1 TABLET(S): at 18:04

## 2019-11-19 RX ADMIN — ATORVASTATIN CALCIUM 80 MILLIGRAM(S): 80 TABLET, FILM COATED ORAL at 22:49

## 2019-11-19 RX ADMIN — Medication 37.5 MILLIGRAM(S): at 22:49

## 2019-11-19 RX ADMIN — Medication 4 UNIT(S): at 09:30

## 2019-11-19 RX ADMIN — Medication 2: at 13:00

## 2019-11-19 RX ADMIN — Medication 4 UNIT(S): at 18:05

## 2019-11-19 RX ADMIN — CARBAMIDE PEROXIDE 1 DROP(S): 81.86 SOLUTION/ DROPS AURICULAR (OTIC) at 05:28

## 2019-11-19 RX ADMIN — Medication 4: at 09:29

## 2019-11-19 RX ADMIN — Medication 37.5 MILLIGRAM(S): at 05:27

## 2019-11-19 RX ADMIN — SODIUM CHLORIDE 100 MILLILITER(S): 9 INJECTION INTRAMUSCULAR; INTRAVENOUS; SUBCUTANEOUS at 18:07

## 2019-11-19 NOTE — PROGRESS NOTE ADULT - PROBLEM SELECTOR PLAN 7
IMPROVE VTE Individual Risk Assessment          RISK                                                          Points  [  ] Previous VTE                                                3  [  ] Thrombophilia                                             2  [  ] Lower limb paralysis                                   2        (unable to hold up >15 seconds)    [  ] Current Cancer                                             2         (within 6 months)  [  ] Immobilization > 24 hrs                              1  [  ] ICU/CCU stay > 24 hours                             1  [  ] Age > 60                                                         1    IMPROVE VTE Score: 0  DVT prophylaxis: SCDs  Fall risk IMPROVE VTE Individual Risk Assessment          RISK                                                          Points  [ X] Previous VTE                                                3  [  ] Thrombophilia                                             2  [  ] Lower limb paralysis                                   2        (unable to hold up >15 seconds)    [  ] Current Cancer                                             2         (within 6 months)  [  ] Immobilization > 24 hrs                              1  [  ] ICU/CCU stay > 24 hours                             1  [  ] Age > 60                                                         1    IMPROVE VTE Score: 3  DVT prophylaxis: Lovenox 40mg

## 2019-11-19 NOTE — CONSULT NOTE ADULT - ASSESSMENT
Assessment/Plan:  54y Male    Hillary Amherst DNP, ANP-C  Cardiology  Spectra #[phone] Assessment/Plan:  55 y/o M with no significant medical Hx except HTN presented with dizziness since Wednesday, found to have acute CVA.  MRA head showed an evolving infarct in the right brachium pontis, though, no hemorrhage.    Acute CVA  - He presented with dizziness which could be his neuro symptom  - His MRA head confirmed acute CVA  - Neuro following  - Continue ASA and statin  - Continue tele monitoring for occult Afib.  NSR on tele.  His EKG showed NSR with non-specific TWI in lead lll  - Allow permissive hypertension per Neuro recs.  He is s/p IV bolus of 500 cc today  - Hold all anti-HTN meds for now  - Continue to ambulate with PT  - Case discussed with Neuro Surgery in [place] by Dr. Street.      He has no evidence of cardiac ischemia or volume overload  Monitor electrolytes, replete to keep K>4 and Mag>2    Will follow closely  Further cardiac w/u pending clinical course  All other w/u per Primary and Neuro    Hillary Perez DNP, ANP-C  Cardiology  Spectra #[phone]

## 2019-11-19 NOTE — PROGRESS NOTE ADULT - PROBLEM SELECTOR PLAN 3
likely 2/2 vertebral artery disease, could be BPPV   - continue Antivert for vertigo  increased dose  today . likely 2/2 vertebral artery disease, could be BPPV   - continue Antivert for vertigo.

## 2019-11-19 NOTE — PROGRESS NOTE ADULT - PROBLEM SELECTOR PLAN 1
Patient with persistent dizziness for 4days for prior  to admission - Antivert 37.5 mg tid.  -ruled out cva  and tia  by 1st mri    but persistent  dizziness and vertigo   so  repeated second mri   brain and mra   found  acute vs subacute rt cerebellar cva -  nih 2 ,   , Start ASA 325mg daily , neurocheck  , echo , dvt prophy , pt evaluation , lipid profile and hba1c done   -MRA head and neck shows vertebral artery calcifications and near complete stenosis  -MRI showing no acute disease, near complete occlusion likely congenital or chronic  -Hemoglobin A1C- 9.5  -TSH- 0.64  -Cholesterol- 310, LDL- 239, HDL- 49  -Speech and swallow evaluation- recommended regular solids with thin liquids  - Neuro recs appreciated Patient with persistent dizziness for 4days for prior  to admission - Antivert 37.5 mg tid.  -ruled out cva  and tia  by 1st mri    but persistent  dizziness and vertigo   so  repeated second mri   brain and mra   found  acute vs subacute rt cerebellar cva -  nih 2 ,   - Start ASA 325mg daily , neurocheck , dvt prophy(lovenox) , pt evaluation , lipid profile and hba1c done   -MRA head and neck shows vertebral artery calcifications and near complete stenosis  -MRI showing no acute disease, near complete occlusion likely congenital or chronic  -Hemoglobin A1C- 9.5  -TSH- 0.64  -Cholesterol- 310, LDL- 239, HDL- 49  -Speech and swallow evaluation- recommended regular solids with thin liquids  - Neuro recs(Dr. Pang) appreciated: goal SBP is over 160  - given 500cc NS bolus, followed with 100ml/hr NS for 24hrs  - Cardio (Dr. Morales's group) recs appreciated   - F/u TTE  - c/w aspirin and statin  - OT recs Acute Rehab on discharge Patient with persistent dizziness for 4days for prior  to admission - Antivert 37.5 mg tid.  -ruled out cva  and tia  by 1st mri    but persistent  dizziness and vertigo   so  repeated second mri   brain and mra   found  acute vs subacute rt cerebellar cva -  nih 2 ,   - Start ASA 325mg daily , neurocheck , dvt prophy(lovenox) , pt evaluation , lipid profile and hba1c done   -MRA head and neck shows vertebral artery calcifications and near complete stenosis  -MRI showing no acute disease, near complete occlusion likely congenital or chronic  -Hemoglobin A1C- 9.5  -TSH- 0.64  -Cholesterol- 310, LDL- 239, HDL- 49  -Speech and swallow evaluation- recommended regular solids with thin liquids  - Neuro recs(Dr. Pang) appreciated: goal SBP is over 160  - given 500cc NS bolus, followed with 100ml/hr NS for 24hrs  - Cardio (Dr. Morales's group) recs appreciated   - F/u TTE done   - c/w aspirin and statin  - OT recs Acute Rehab on discharge

## 2019-11-19 NOTE — PROGRESS NOTE ADULT - PROBLEM SELECTOR PLAN 6
permissive htn    hold all   bp meds - all bb and ace inhibitor meds permissive htn    hold all   bp meds - all bb and ace inhibitor meds  - goal SBP = over 160

## 2019-11-19 NOTE — OCCUPATIONAL THERAPY INITIAL EVALUATION ADULT - PERTINENT HX OF CURRENT PROBLEM, REHAB EVAL
Pt is a 55 y/o male Pt is a 55 y/o male presented with dizziness for 4 days admitted for stroke-like symptoms and vertebral artery stenosis/occlusion; MRI head 11/18/19 showed evolving acute right brachium pontis infarct.

## 2019-11-19 NOTE — OCCUPATIONAL THERAPY INITIAL EVALUATION ADULT - RANGE OF MOTION EXAMINATION, UPPER EXTREMITY
bilateral UE Active ROM was WFL  (within functional limits)/BUE FMC opposition x5 digits WFL but decreased manipulation skills noted during ADLs (ie managing socks). Sensation grossly intact to light touch BUE. Decreased gross motor coordination noted RUE (+) dysmetria with finger to/from nose. Pt is Left hand dominant.

## 2019-11-19 NOTE — OCCUPATIONAL THERAPY INITIAL EVALUATION ADULT - GENERAL OBSERVATIONS, REHAB EVAL
Pt received seated in chair, lethargic; however, clear for OT eval per RN and pt in agreement to participate.

## 2019-11-19 NOTE — CONSULT NOTE ADULT - ATTENDING COMMENTS
54M PMH HTN presents with dizziness, unsteady gait, and vertigo, eventually found to have right pontine/cerebellar acute infarct. Symptoms started 11/13, stable since then.    - No acute change in neurological exam  - Agree with aspirin and statin  - Newly diagnosed diabetes, needs adequate glucose control  - Out of the window for TPA  - Does not require ICU care at this time, please call back with questions
I saw and examined the patient personally. Spoke with above provider regarding this case. I reviewed the above findings completely.  I agree with the above history, physical, and plan which I have edited where appropriate.     new CVA  echo  monitor tele

## 2019-11-19 NOTE — OCCUPATIONAL THERAPY INITIAL EVALUATION ADULT - ADDITIONAL COMMENTS
Pt reported he lives in an apartment and sleeps on the floor; bathroom has a shower. Pt reported PTA he was independent with ADLs and functional mobility without AD; uses public transportation; works as .

## 2019-11-19 NOTE — CONSULT NOTE ADULT - SUBJECTIVE AND OBJECTIVE BOX
[facility] - Tom Morales, Regan Tobias, Smita, Raffi, Mehgna Murguia  Office Number: [phone]    Initial Consult Note: Acute CVA    CHIEF COMPLAINT: Patient is a 54y old  Male who presents with a chief complaint of possible stroke (19 Nov 2019 09:42)    HPI:  54M with PMH of HTN presents with dizziness for 4 days. States he feels like the room is spinning and has an unsteady gait. Reports intermittent dizziness for a 2 days but progressed to constant dizziness, he was unable to stand without holding unto objects near by. Patient walks to the [facility] for work daily and couldn't walk without reaching and leaning on trees. States similar brief episode of dizziness last week but was able to go to work and symptoms resolved after a minute. Patient works as a  in [employer], skins and cuts fish, also does heavy loading. States diet consists of "eat on the go take out," including 7/11, bagels, soda, ice tea, deli food and chinese food. Admits to two episodes of non-bloody, non-bilious emesis and was unable to keep food down today, prompting urgent care evaluation. Patient was given Meclizine at the urgent care and was advised ED eval if symptoms persisted. Denies chest pain, palpitations, SOB, FRENCH, abd pain, c/d. Denies ear pain, sore throat. States his form of exercise is walking, as patient does not have a car.     In the ED: Temp 98.1, HR 88, /102, RR 16, SpO2 100% RA.   H/H: 18.4/51.6, glucose 267.   CT head: No acute hemorrhage or mass effect.  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  NIH Stroke Scale: 0, EKG: NSR 94  Received ASA 325mg, Augmentin x1, 1L NS bolus, Valium 5mg x1, Meclizine 25mg x1, Solu-Medrol 125mg IV x1. (14 Nov 2019 22:53)    PAST MEDICAL & SURGICAL HISTORY:  HTN (hypertension)  No significant past surgical history    SOCIAL HISTORY:  No tobacco, ethanol, or drug abuse.  FAMILY HISTORY:  No pertinent family history in first degree relatives    No family history of acute MI or sudden cardiac death.  MEDICATIONS  (STANDING):  amoxicillin  875 milliGRAM(s)/clavulanate 1 Tablet(s) Oral two times a day  aspirin 325 milliGRAM(s) Oral daily  atorvastatin 80 milliGRAM(s) Oral at bedtime  carbamide peroxide Otic Solution 1 Drop(s) Both Ears two times a day  dextrose 5%. 1000 milliLiter(s) (50 mL/Hr) IV Continuous <Continuous>  dextrose 50% Injectable 12.5 Gram(s) IV Push once  dextrose 50% Injectable 25 Gram(s) IV Push once  dextrose 50% Injectable 25 Gram(s) IV Push once  enoxaparin Injectable 40 milliGRAM(s) SubCutaneous daily  insulin lispro (HumaLOG) corrective regimen sliding scale   SubCutaneous three times a day before meals  insulin lispro (HumaLOG) corrective regimen sliding scale   SubCutaneous at bedtime  insulin lispro Injectable (HumaLOG) 4 Unit(s) SubCutaneous three times a day with meals  meclizine 37.5 milliGRAM(s) Oral every 8 hours  sodium chloride 0.9%. 1000 milliLiter(s) (75 mL/Hr) IV Continuous <Continuous>  sodium chloride 0.9%. 1000 milliLiter(s) (100 mL/Hr) IV Continuous <Continuous>    MEDICATIONS  (PRN):  dextrose 40% Gel 15 Gram(s) Oral once PRN Blood Glucose LESS THAN 70 milliGRAM(s)/deciliter  glucagon  Injectable 1 milliGRAM(s) IntraMuscular once PRN Glucose LESS THAN 70 milligrams/deciliter    Allergies    No Known Allergies    Intolerances    REVIEW OF SYSTEMS:  CONSTITUTIONAL: No weakness, fevers or chills  EYES/ENT: No visual changes;  No vertigo or throat pain   NECK: No pain or stiffness  RESPIRATORY: No cough, wheezing, hemoptysis; No shortness of breath  CARDIOVASCULAR: No chest pain or palpitations  GASTROINTESTINAL: No abdominal pain. No nausea, vomiting, or hematemesis; No diarrhea or constipation. No melena or hematochezia.  GENITOURINARY: No dysuria, frequency or hematuria  NEUROLOGICAL: No numbness or weakness  SKIN: No itching or rash  All other review of systems is negative unless indicated above  VITAL SIGNS:   Vital Signs Last 24 Hrs  T(C): 36.7 (19 Nov 2019 07:40), Max: 37.1 (18 Nov 2019 19:32)  T(F): 98 (19 Nov 2019 07:40), Max: 98.8 (18 Nov 2019 19:32)  HR: 90 (19 Nov 2019 09:33) (65 - 90)  BP: 135/104 (19 Nov 2019 09:33) (134/98 - 156/110)  BP(mean): --  RR: 20 (19 Nov 2019 07:40) (18 - 20)  SpO2: 93% (19 Nov 2019 07:40) (93% - 96%)  I&O's Summary    18 Nov 2019 07:01  -  19 Nov 2019 07:00  --------------------------------------------------------  IN: 200 mL / OUT: 0 mL / NET: 200 mL    On Exam:  Constitutional: NAD, alert and oriented x 3  Lungs:  Non-labored, breath sounds are clear bilaterally, No wheezing, rales or rhonchi  Cardiovascular: RRR.  S1 and S2 positive.  No murmurs, rubs, gallops or clicks  Gastrointestinal: Bowel Sounds present, soft, nontender.   Lymph: No peripheral edema. No cervical lymphadenopathy.  Neurological: Alert, no focal deficits  Skin: No rashes or ulcers   Psych:  Mood & affect appropriate.    LABS: All Labs Reviewed:                        17.8   10.65 )-----------( 200      ( 19 Nov 2019 06:04 )             50.3                         17.4   11.01 )-----------( 195      ( 18 Nov 2019 05:12 )             49.1     19 Nov 2019 06:04    137    |  106    |  34     ----------------------------<  202    3.9     |  25     |  1.40   18 Nov 2019 05:12    139    |  107    |  35     ----------------------------<  225    4.0     |  26     |  1.40     Ca    8.6        19 Nov 2019 06:04  Ca    8.9        18 Nov 2019 05:12    RADIOLOGY:  TTE pending    < from: MR Head No Cont (11.18.19 @ 13:07) >    EXAM:  MR BRAIN                          PROCEDURE DATE:  11/18/2019      INTERPRETATION:  CLINICAL INDICATION: Dizziness, unsteady gait. Evaluate   for CVA.    TECHNIQUE: Multi-planar multi-sequential MR imaging of the brain was   performed without intravenous contrast.    COMPARISON: Brain MRI, MR angiography head and neck 11/15/2019.    FINDINGS:    There is hyperintensity on diffusion-weighted sequence with associated   signal dropout on corresponding ADC maps in the right brachium pontis   (series 4, images 8-10), findings compatible with evolving acute infarct.   There is no associated hemorrhage.    There are scattered T2/FLAIR hyperintense changes in the periventricular   and subcortical white matter which are nonspecific finding, but most   likely represent sequelae of mild to moderate chronic microvascular   ischemic disease. There is mild diffuse cortical sulcal prominence   related to underlying brain parenchymal volume loss.    There is no intracranial mass. The ventricles are normal without evidence   of hydrocephalus. There are no extra-axial fluid collections. The skull   base flow voids are present.  There is an enlarged, partially empty sella turcica.      The visualized intraorbital contents are normal. There are mild mucosal   inflammatory changes of the ethmoid air cells. The mastoid air cells are   clear. The visualized soft tissues and osseous structures appear   unremarkable.    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 No associated   hemorrhage.    These findings were discussed with Dr. Pang of the patient's clinical   team on 11/18/2019 at approximately 2:00 PM.    ARPAN SALDANA M.D., ATTENDING RADIOLOGIST  This document has been electronically signed. Nov 18 2019  2:11PM     < end of copied text >    EKG:  < from: 12 Lead ECG (11.14.19 @ 18:48) >    Ventricular Rate 94 BPM    Atrial Rate 94 BPM    P-R Interval 150 ms    QRS Duration 84 ms    Q-T Interval 348 ms    QTC Calculation(Bezet) 435 ms    P Axis 36 degrees    R Axis 44 degrees    T Axis -12 degrees    Diagnosis Line Normal sinus rhythm  Possible Inferior infarct , age undetermined  Abnormal ECG  No previous ECGs available  Confirmed by Jairo Tobias MD ([id number]) on 11/15/2019 12:16:56 PM    < end of copied text > [facility] - Tom Morales, Micheline, Regan, Smita, Raffi, Meghna Murguia  Office Number: [phone]    Initial Consult Note: 53 y/o M with no significant medical Hx except HTN presented with dizziness since Wednesday, found to have acute CVA.    CHIEF COMPLAINT: Patient is a 54y old  Male who presents with a chief complaint of possible stroke (19 Nov 2019 09:42)    HPI:  54M with PMH of HTN presents with dizziness for 4 days. States he feels like the room is spinning and has an unsteady gait. Reports intermittent dizziness for a 2 days but progressed to constant dizziness, he was unable to stand without holding unto objects near by. Patient walks to the [facility] for work daily and couldn't walk without reaching and leaning on trees. States similar brief episode of dizziness last week but was able to go to work and symptoms resolved after a minute. Patient works as a  in [employer], skins and cuts fish, also does heavy loading. States diet consists of "eat on the go take out," including 7/11, bagels, soda, ice tea, deli food and chinese food. Admits to two episodes of non-bloody, non-bilious emesis and was unable to keep food down today, prompting urgent care evaluation. Patient was given Meclizine at the urgent care and was advised ED eval if symptoms persisted. Denies chest pain, palpitations, SOB, FRENCH, abd pain, c/d. Denies ear pain, sore throat. States his form of exercise is walking, as patient does not have a car.     In the ED: Temp 98.1, HR 88, /102, RR 16, SpO2 100% RA.   H/H: 18.4/51.6, glucose 267.   CT head: No acute hemorrhage or mass effect.  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  NIH Stroke Scale: 0, EKG: NSR 94  Received ASA 325mg, Augmentin x1, 1L NS bolus, Valium 5mg x1, Meclizine 25mg x1, Solu-Medrol 125mg IV x1. (14 Nov 2019 22:53)    PAST MEDICAL & SURGICAL HISTORY:  HTN (hypertension)  No significant past surgical history    SOCIAL HISTORY:  No tobacco, ethanol, or drug abuse.  FAMILY HISTORY:  No pertinent family history in first degree relatives    No family history of acute MI or sudden cardiac death.  MEDICATIONS  (STANDING):  amoxicillin  875 milliGRAM(s)/clavulanate 1 Tablet(s) Oral two times a day  aspirin 325 milliGRAM(s) Oral daily  atorvastatin 80 milliGRAM(s) Oral at bedtime  carbamide peroxide Otic Solution 1 Drop(s) Both Ears two times a day  dextrose 5%. 1000 milliLiter(s) (50 mL/Hr) IV Continuous <Continuous>  dextrose 50% Injectable 12.5 Gram(s) IV Push once  dextrose 50% Injectable 25 Gram(s) IV Push once  dextrose 50% Injectable 25 Gram(s) IV Push once  enoxaparin Injectable 40 milliGRAM(s) SubCutaneous daily  insulin lispro (HumaLOG) corrective regimen sliding scale   SubCutaneous three times a day before meals  insulin lispro (HumaLOG) corrective regimen sliding scale   SubCutaneous at bedtime  insulin lispro Injectable (HumaLOG) 4 Unit(s) SubCutaneous three times a day with meals  meclizine 37.5 milliGRAM(s) Oral every 8 hours  sodium chloride 0.9%. 1000 milliLiter(s) (75 mL/Hr) IV Continuous <Continuous>  sodium chloride 0.9%. 1000 milliLiter(s) (100 mL/Hr) IV Continuous <Continuous>    MEDICATIONS  (PRN):  dextrose 40% Gel 15 Gram(s) Oral once PRN Blood Glucose LESS THAN 70 milliGRAM(s)/deciliter  glucagon  Injectable 1 milliGRAM(s) IntraMuscular once PRN Glucose LESS THAN 70 milligrams/deciliter    Allergies    No Known Allergies    Intolerances    REVIEW OF SYSTEMS:  CONSTITUTIONAL: + weakness.  No fevers or chills  EYES/ENT: No visual changes;  + dizziness.  No throat pain   NECK: No pain or stiffness  RESPIRATORY: No cough, wheezing, hemoptysis; No shortness of breath  CARDIOVASCULAR: No chest pain or palpitations  GASTROINTESTINAL: No abdominal pain. No nausea, vomiting, or hematemesis; No diarrhea or constipation. No melena or hematochezia.  GENITOURINARY: No dysuria, frequency or hematuria  NEUROLOGICAL: No numbness or weakness  SKIN: No itching or rash  All other review of systems is negative unless indicated above  VITAL SIGNS:   Vital Signs Last 24 Hrs  T(C): 36.7 (19 Nov 2019 07:40), Max: 37.1 (18 Nov 2019 19:32)  T(F): 98 (19 Nov 2019 07:40), Max: 98.8 (18 Nov 2019 19:32)  HR: 90 (19 Nov 2019 09:33) (65 - 90)  BP: 135/104 (19 Nov 2019 09:33) (134/98 - 156/110)  BP(mean): --  RR: 20 (19 Nov 2019 07:40) (18 - 20)  SpO2: 93% (19 Nov 2019 07:40) (93% - 96%)  I&O's Summary    18 Nov 2019 07:01  -  19 Nov 2019 07:00  --------------------------------------------------------  IN: 200 mL / OUT: 0 mL / NET: 200 mL    On Exam:  Constitutional: NAD, alert and oriented x 3, obese  Lungs:  Non-labored, breath sounds are clear bilaterally, No wheezing, rales or rhonchi  Cardiovascular: RRR.  S1 and S2 positive.  No murmurs, rubs, gallops or clicks  Gastrointestinal: Bowel Sounds present, soft, nontender.   Lymph: No peripheral edema. No cervical lymphadenopathy.  Neurological: Alert, no focal deficits.  disturbance in proprioception right UE.  Skin: No rashes or ulcers   Psych:  Mood & affect appropriate.    LABS: All Labs Reviewed:                        17.8   10.65 )-----------( 200      ( 19 Nov 2019 06:04 )             50.3                         17.4   11.01 )-----------( 195      ( 18 Nov 2019 05:12 )             49.1     19 Nov 2019 06:04    137    |  106    |  34     ----------------------------<  202    3.9     |  25     |  1.40   18 Nov 2019 05:12    139    |  107    |  35     ----------------------------<  225    4.0     |  26     |  1.40     Ca    8.6        19 Nov 2019 06:04  Ca    8.9        18 Nov 2019 05:12    RADIOLOGY:  TTE pending    < from: MR Head No Cont (11.18.19 @ 13:07) >    EXAM:  MR BRAIN                          PROCEDURE DATE:  11/18/2019      INTERPRETATION:  CLINICAL INDICATION: Dizziness, unsteady gait. Evaluate   for CVA.    TECHNIQUE: Multi-planar multi-sequential MR imaging of the brain was   performed without intravenous contrast.    COMPARISON: Brain MRI, MR angiography head and neck 11/15/2019.    FINDINGS:    There is hyperintensity on diffusion-weighted sequence with associated   signal dropout on corresponding ADC maps in the right brachium pontis   (series 4, images 8-10), findings compatible with evolving acute infarct.   There is no associated hemorrhage.    There are scattered T2/FLAIR hyperintense changes in the periventricular   and subcortical white matter which are nonspecific finding, but most   likely represent sequelae of mild to moderate chronic microvascular   ischemic disease. There is mild diffuse cortical sulcal prominence   related to underlying brain parenchymal volume loss.    There is no intracranial mass. The ventricles are normal without evidence   of hydrocephalus. There are no extra-axial fluid collections. The skull   base flow voids are present.  There is an enlarged, partially empty sella turcica.    The visualized intraorbital contents are normal. There are mild mucosal   inflammatory changes of the ethmoid air cells. The mastoid air cells are   clear. The visualized soft tissues and osseous structures appear   unremarkable.    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 No associated   hemorrhage.    These findings were discussed with Dr. Pang of the patient's clinical   team on 11/18/2019 at approximately 2:00 PM.    ARPAN SALDANA M.D., ATTENDING RADIOLOGIST  This document has been electronically signed. Nov 18 2019  2:11PM     < end of copied text >    EKG:  < from: 12 Lead ECG (11.14.19 @ 18:48) >    Ventricular Rate 94 BPM    Atrial Rate 94 BPM    P-R Interval 150 ms    QRS Duration 84 ms    Q-T Interval 348 ms    QTC Calculation(Bezet) 435 ms    P Axis 36 degrees    R Axis 44 degrees    T Axis -12 degrees    Diagnosis Line Normal sinus rhythm  Possible Inferior infarct , age undetermined  Abnormal ECG  No previous ECGs available  Confirmed by Micheline CAMACHO, Jairo ([id number]) on 11/15/2019 12:16:56 PM    < end of copied text > [facility] - Tom Moraels, Micheline, Regan, Smita, Raffi, Meghna Murguia  Office Number: [phone]    Initial Consult Note: 53 y/o M with no significant medical Hx except HTN presented with dizziness since Wednesday, found to have acute CVA.    CHIEF COMPLAINT: Patient is a 54y old  Male who presents with a chief complaint of possible stroke (19 Nov 2019 09:42)    HPI:  54M with PMH of HTN presented with dizziness for 4 days. States he feels like the room is spinning and has an unsteady gait. Reports intermittent dizziness for a 2 days but progressed to constant dizziness, he was unable to stand without holding unto objects near by. Patient walks to the [facility] for work daily and couldn't walk without reaching and leaning on trees. States similar brief episode of dizziness last week but was able to go to work and symptoms resolved after a minute. Patient works as a  in [employer], skins and cuts fish, also does heavy loading. States diet consists of "eat on the go take out," including 7/11, bagels, soda, ice tea, deli food and chinese food. Admits to two episodes of non-bloody, non-bilious emesis and was unable to keep food down today, prompting urgent care evaluation. Patient was given Meclizine at the urgent care and was advised ED eval if symptoms persisted. Denies chest pain, palpitations, SOB, FRENCH, abd pain, c/d. Denies ear pain, sore throat. States his form of exercise is walking, as patient does not have a car.     In the ED: Temp 98.1, HR 88, /102, RR 16, SpO2 100% RA.   H/H: 18.4/51.6, glucose 267.   CT head: No acute hemorrhage or mass effect.  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  NIH Stroke Scale: 0, EKG: NSR 94  Received ASA 325mg, Augmentin x1, 1L NS bolus, Valium 5mg x1, Meclizine 25mg x1, Solu-Medrol 125mg IV x1.      Denies any chest pain, dyspnea, palpitations, PND, orthopnea, near syncope, syncope, lower extremity edema. Still feels weak and intermittent dizziness.     PAST MEDICAL & SURGICAL HISTORY:  HTN (hypertension)  No significant past surgical history    SOCIAL HISTORY:  No tobacco, ethanol, or drug abuse.  FAMILY HISTORY:  No pertinent family history in first degree relatives    No family history of acute MI or sudden cardiac death.  MEDICATIONS  (STANDING):  amoxicillin  875 milliGRAM(s)/clavulanate 1 Tablet(s) Oral two times a day  aspirin 325 milliGRAM(s) Oral daily  atorvastatin 80 milliGRAM(s) Oral at bedtime  carbamide peroxide Otic Solution 1 Drop(s) Both Ears two times a day  dextrose 5%. 1000 milliLiter(s) (50 mL/Hr) IV Continuous <Continuous>  dextrose 50% Injectable 12.5 Gram(s) IV Push once  dextrose 50% Injectable 25 Gram(s) IV Push once  dextrose 50% Injectable 25 Gram(s) IV Push once  enoxaparin Injectable 40 milliGRAM(s) SubCutaneous daily  insulin lispro (HumaLOG) corrective regimen sliding scale   SubCutaneous three times a day before meals  insulin lispro (HumaLOG) corrective regimen sliding scale   SubCutaneous at bedtime  insulin lispro Injectable (HumaLOG) 4 Unit(s) SubCutaneous three times a day with meals  meclizine 37.5 milliGRAM(s) Oral every 8 hours  sodium chloride 0.9%. 1000 milliLiter(s) (75 mL/Hr) IV Continuous <Continuous>  sodium chloride 0.9%. 1000 milliLiter(s) (100 mL/Hr) IV Continuous <Continuous>    MEDICATIONS  (PRN):  dextrose 40% Gel 15 Gram(s) Oral once PRN Blood Glucose LESS THAN 70 milliGRAM(s)/deciliter  glucagon  Injectable 1 milliGRAM(s) IntraMuscular once PRN Glucose LESS THAN 70 milligrams/deciliter    Allergies    No Known Allergies    Intolerances    REVIEW OF SYSTEMS:  CONSTITUTIONAL: + weakness.  No fevers or chills  EYES/ENT: No visual changes;  + dizziness.  No throat pain   NECK: No pain or stiffness  RESPIRATORY: No cough, wheezing, hemoptysis; No shortness of breath  CARDIOVASCULAR: No chest pain or palpitations  GASTROINTESTINAL: No abdominal pain. No nausea, vomiting, or hematemesis; No diarrhea or constipation. No melena or hematochezia.  GENITOURINARY: No dysuria, frequency or hematuria  NEUROLOGICAL: No numbness or weakness  SKIN: No itching or rash  All other review of systems is negative unless indicated above  VITAL SIGNS:   Vital Signs Last 24 Hrs  T(C): 36.7 (19 Nov 2019 07:40), Max: 37.1 (18 Nov 2019 19:32)  T(F): 98 (19 Nov 2019 07:40), Max: 98.8 (18 Nov 2019 19:32)  HR: 90 (19 Nov 2019 09:33) (65 - 90)  BP: 135/104 (19 Nov 2019 09:33) (134/98 - 156/110)  BP(mean): --  RR: 20 (19 Nov 2019 07:40) (18 - 20)  SpO2: 93% (19 Nov 2019 07:40) (93% - 96%)  I&O's Summary    18 Nov 2019 07:01  -  19 Nov 2019 07:00  --------------------------------------------------------  IN: 200 mL / OUT: 0 mL / NET: 200 mL    On Exam:  Constitutional: NAD, alert and oriented x 3, obese  HEENT MMM anicteric  Lungs:  Non-labored, breath sounds are clear bilaterally, No wheezing, rales or rhonchi  Cardiovascular: RRR.  S1 and S2 positive.  No murmurs, rubs, gallops or clicks  Gastrointestinal: Bowel Sounds present, soft, nontender.   Lymph: No peripheral edema. No cervical lymphadenopathy.  Neurological: Alert, no focal deficits.  disturbance in proprioception right UE.  Skin: No rashes or ulcers   Psych:  Mood & affect appropriate.    LABS: All Labs Reviewed:                        17.8   10.65 )-----------( 200      ( 19 Nov 2019 06:04 )             50.3                         17.4   11.01 )-----------( 195      ( 18 Nov 2019 05:12 )             49.1     19 Nov 2019 06:04    137    |  106    |  34     ----------------------------<  202    3.9     |  25     |  1.40   18 Nov 2019 05:12    139    |  107    |  35     ----------------------------<  225    4.0     |  26     |  1.40     Ca    8.6        19 Nov 2019 06:04  Ca    8.9        18 Nov 2019 05:12    RADIOLOGY:  TTE pending    < from: MR Head No Cont (11.18.19 @ 13:07) >    EXAM:  MR BRAIN                          PROCEDURE DATE:  11/18/2019      INTERPRETATION:  CLINICAL INDICATION: Dizziness, unsteady gait. Evaluate   for CVA.    TECHNIQUE: Multi-planar multi-sequential MR imaging of the brain was   performed without intravenous contrast.    COMPARISON: Brain MRI, MR angiography head and neck 11/15/2019.    FINDINGS:    There is hyperintensity on diffusion-weighted sequence with associated   signal dropout on corresponding ADC maps in the right brachium pontis   (series 4, images 8-10), findings compatible with evolving acute infarct.   There is no associated hemorrhage.    There are scattered T2/FLAIR hyperintense changes in the periventricular   and subcortical white matter which are nonspecific finding, but most   likely represent sequelae of mild to moderate chronic microvascular   ischemic disease. There is mild diffuse cortical sulcal prominence   related to underlying brain parenchymal volume loss.    There is no intracranial mass. The ventricles are normal without evidence   of hydrocephalus. There are no extra-axial fluid collections. The skull   base flow voids are present.  There is an enlarged, partially empty sella turcica.    The visualized intraorbital contents are normal. There are mild mucosal   inflammatory changes of the ethmoid air cells. The mastoid air cells are   clear. The visualized soft tissues and osseous structures appear   unremarkable.    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 No associated   hemorrhage.    These findings were discussed with Dr. Pang of the patient's clinical   team on 11/18/2019 at approximately 2:00 PM.    ARPAN SALDANA M.D., ATTENDING RADIOLOGIST  This document has been electronically signed. Nov 18 2019  2:11PM     < end of copied text >    EKG:  < from: 12 Lead ECG (11.14.19 @ 18:48) >    Ventricular Rate 94 BPM    Atrial Rate 94 BPM    P-R Interval 150 ms    QRS Duration 84 ms    Q-T Interval 348 ms    QTC Calculation(Bezet) 435 ms    P Axis 36 degrees    R Axis 44 degrees    T Axis -12 degrees    Diagnosis Line Normal sinus rhythm  Possible Inferior infarct , age undetermined  Abnormal ECG  No previous ECGs available  Confirmed by Micheline CAMACHO, Jairo ([id number]) on 11/15/2019 12:16:56 PM    < end of copied text >

## 2019-11-19 NOTE — PROGRESS NOTE ADULT - SUBJECTIVE AND OBJECTIVE BOX
Neurology Follow up note    ERASTO [id number]    HPI:  54M with PMH of HTN presents with dizziness for 4 days. States he feels like the room is spinning and has an unsteady gait. Reports intermittent dizziness for a 2 days but progressed to constant dizziness, he was unable to stand without holding unto objects near by. Patient walks to the [facility] for work daily and couldn't walk without reaching and leaning on trees. States similar brief episode of dizziness last week but was able to go to work and symptoms resolved after a minute. Patient works as a  in [employer], skins and cuts fish, also does heavy loading. States diet consists of "eat on the go take out," including 7/11, bagels, soda, ice tea, deli food and chinese food. Admits to two episodes of non-bloody, non-bilious emesis and was unable to keep food down today, prompting urgent care evaluation. Patient was given Meclizine at the urgent care and was advised ED eval if symptoms persisted. Denies chest pain, palpitations, SOB, FRENCH, abd pain, c/d. Denies ear pain, sore throat. States his form of exercise is walking, as patient does not have a car.     In the ED: Temp 98.1, HR 88, /102, RR 16, SpO2 100% RA.   H/H: 18.4/51.6, glucose 267.   CT head: No acute hemorrhage or mass effect.  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  NIH Stroke Scale: 0, EKG: NSR 94  Received ASA 325mg, Augmentin x1, 1L NS bolus, Valium 5mg x1, Meclizine 25mg x1, Solu-Medrol 125mg IV x1. (14 Nov 2019 22:53)      Interval History - Increased difficulty in ambulating despite using walker,    Patient is seen, chart was reviewed and case was discussed with the treatment team.  Pt is not in any distress.   Lying on bed comfortably.   No events reported overnight.   No clinical seizure was reported.  Sitting on chair bed comfortably.    is at bedside.    Vital Signs Last 24 Hrs  T(C): 36.7 (19 Nov 2019 07:40), Max: 37.1 (18 Nov 2019 19:32)  T(F): 98 (19 Nov 2019 07:40), Max: 98.8 (18 Nov 2019 19:32)  HR: 90 (19 Nov 2019 09:33) (61 - 90)  BP: 135/104 (19 Nov 2019 09:33) (134/98 - 158/104)  BP(mean): --  RR: 20 (19 Nov 2019 07:40) (18 - 20)  SpO2: 93% (19 Nov 2019 07:40) (93% - 96%)        REVIEW OF SYSTEMS:    Constitutional: No fever, weight loss or fatigue  Eyes: No eye pain, visual disturbances, or discharge  ENT:  No sinus or throat pain  Neck: No pain or stiffness  Respiratory: No cough, wheezing, chills or hemoptysis  Cardiovascular: No chest pain, palpitations,   Gastrointestinal: No abdominal or epigastric pain.   Genitourinary: No dysuria, frequency, hematuria or incontinence  Neurological: No headaches, memory loss, loss of strength,  Psychiatric: No depression, anxiety, mood swings or difficulty sleeping  Musculoskeletal: No joint pain or swelling;   Skin: No itching, burning, rashes or lesions   Lymph Nodes: No enlarged glands  Endocrine: No heat or cold intolerance;   Allergy and Immunologic: No hives or eczema    On Neurological Examination:    Mental Status - Pt is alert, awake, oriented X3. Follows commands well and able to answer questions appropriately  .Mood and affect  normal    Speech -  Normal.     Cranial Nerves - Pupils 3 mm equal and reactive to light, extraocular eye movements intact.   left beating horizontal.  Pt has no facial asymmetry. Facial sensation is intact.  Tongue - is in midline.    Muscle tone - is normal     Motor Exam - UE; 5/5      Sensory Exam - Pt withdraws all extremities equally on stimulation. No asymmetry seen. No complaints of tingling, numbness.    Gait - severe right hemiataxia; needed walker and one person assistance.      coordination:    Finger to nose: normal    Deep tendon Reflexes - 2 plus all over.         Neck Supple -  Yes.     MEDICATIONS    amoxicillin  875 milliGRAM(s)/clavulanate 1 Tablet(s) Oral two times a day  aspirin 325 milliGRAM(s) Oral daily  atorvastatin 80 milliGRAM(s) Oral at bedtime  carbamide peroxide Otic Solution 1 Drop(s) Both Ears two times a day  dextrose 40% Gel 15 Gram(s) Oral once PRN  dextrose 5%. 1000 milliLiter(s) IV Continuous <Continuous>  dextrose 50% Injectable 12.5 Gram(s) IV Push once  dextrose 50% Injectable 25 Gram(s) IV Push once  dextrose 50% Injectable 25 Gram(s) IV Push once  glucagon  Injectable 1 milliGRAM(s) IntraMuscular once PRN  insulin lispro (HumaLOG) corrective regimen sliding scale   SubCutaneous three times a day before meals  insulin lispro (HumaLOG) corrective regimen sliding scale   SubCutaneous at bedtime  insulin lispro Injectable (HumaLOG) 4 Unit(s) SubCutaneous three times a day with meals  lisinopril 2.5 milliGRAM(s) Oral daily  meclizine 37.5 milliGRAM(s) Oral every 8 hours  metoprolol tartrate 50 milliGRAM(s) Oral two times a day  sodium chloride 0.9%. 1000 milliLiter(s) IV Continuous <Continuous>      Allergies    No Known Allergies    Intolerances                              17.8   10.65 )-----------( 200      ( 19 Nov 2019 06:04 )             50.3         11-19    137  |  106  |  34<H>  ----------------------------<  202<H>  3.9   |  25  |  1.40<H>    Ca    8.6      19 Nov 2019 06:04          Hemoglobin A1C:     Vitamin B12     RADIOLOGY  < from: MR Head No Cont (11.18.19 @ 13:07) >    EXAM:  MR BRAIN                            PROCEDURE DATE:  11/18/2019          INTERPRETATION:  CLINICAL INDICATION: Dizziness, unsteady gait. Evaluate   for CVA.    TECHNIQUE: Multi-planar multi-sequential MR imaging of the brain was   performed without intravenous contrast.    COMPARISON: Brain MRI, MR angiography head and neck 11/15/2019.    FINDINGS:    There is hyperintensity on diffusion-weighted sequence with associated   signal dropout on corresponding ADC maps in the right brachium pontis   (series 4, images 8-10), findings compatible with evolving acute infarct.   There is no associated hemorrhage.    There are scattered T2/FLAIR hyperintense changes in the periventricular   and subcortical white matter which are nonspecific finding, but most   likely represent sequelae of mild to moderate chronic microvascular   ischemic disease. There is mild diffuse cortical sulcal prominence   related to underlying brain parenchymal volume loss.    There is no intracranial mass. The ventricles are normal without evidence   of hydrocephalus. There are no extra-axial fluid collections. The skull   base flow voids are present.  There is an enlarged, partially empty sella turcica.      The visualized intraorbital contents are normal. There are mild mucosal   inflammatory changes of the ethmoid air cells. The mastoid air cells are   clear. The visualized soft tissues and osseous structures appear   unremarkable.    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 Noassociated   hemorrhage.    These findings were discussed with Dr. Pang of the patient's clinical   team on 11/18/2019 at approximately 2:00 PM.                ARPAN SALDANA M.D., ATTENDING RADIOLOGIST  This document has been electronically signed. Nov 18 2019  2:11PM             ASSESSMENT AND PLAN:      presented  with vertigo/ataxic gait, related to subacute right brachium pontis cva  there is worsening of ataxia.  occlusion of right V4      normal saline bolus 500 ml over one hour.  keep sbp over 160.  dc all antihypertensive.  continue ap/statin.  Physical therapy evaluation.  Pain is accessed and addressed.  Would continue to follow.

## 2019-11-19 NOTE — OCCUPATIONAL THERAPY INITIAL EVALUATION ADULT - LEVEL OF INDEPENDENCE: TOILET, REHAB EVAL
not tested due to decreased safety; ataxic with attempt to take step and LOB noted with mod/maxAx1 to regain midline

## 2019-11-19 NOTE — OCCUPATIONAL THERAPY INITIAL EVALUATION ADULT - IADL RETRAINING, OT EVAL
Patient will increase standing tolerance to 1-2 minutes to promote safe toilet transfers within 1 week

## 2019-11-19 NOTE — PROGRESS NOTE ADULT - SUBJECTIVE AND OBJECTIVE BOX
Patient is a 54y old  Male who presents with a chief complaint of possible stroke (19 Nov 2019 12:34)    HPI:  54M with PMH of HTN presents with dizziness for 4 days. States he feels like the room is spinning and has an unsteady gait. Reports intermittent dizziness for a 2 days but progressed to constant dizziness, he was unable to stand without holding unto objects near by. [facility] and couldn't walk without reaching and leaning on trees. States similar brief episode of dizziness last week but was able to go to work and symptoms resolved after a minute. Patient works as a  in [employer], skins and cuts fish, also does heavy loading. States diet consists of "eat on the go take out," including 7/11, bagels, soda, ice tea, deli food and chinese food. Admits to two episodes of non-bloody, non-bilious emesis and was unable to keep food down today, prompting urgent care evaluation. Patient was given Meclizine at the urgent care and was advised ED eval if symptoms persisted. Denies chest pain, palpitations, SOB, FRENCH,    admitted for stroke-like symptoms and vertebral artery stenosis/occlusion  now acute vs subacute cva .-       INTERVAL HPI/OVERNIGHT EVENTS: Patient was seen and examined at bedside. Patient reports he feels decreased sensation over the right side of his face and tongue. Pt states he feels he is still very unsteady when trying to walk and is feeling generalized weakness. admits to continuing dizziness and right sided hearing loss. Tolerating PO. Denies cp, SOB, n/v, headache, change in vision.    MEDICATIONS  (STANDING):  amoxicillin  875 milliGRAM(s)/clavulanate 1 Tablet(s) Oral two times a day  aspirin 325 milliGRAM(s) Oral daily  atorvastatin 80 milliGRAM(s) Oral at bedtime  carbamide peroxide Otic Solution 1 Drop(s) Both Ears two times a day  dextrose 5%. 1000 milliLiter(s) (50 mL/Hr) IV Continuous <Continuous>  dextrose 50% Injectable 12.5 Gram(s) IV Push once  dextrose 50% Injectable 25 Gram(s) IV Push once  dextrose 50% Injectable 25 Gram(s) IV Push once  enoxaparin Injectable 40 milliGRAM(s) SubCutaneous daily  insulin lispro (HumaLOG) corrective regimen sliding scale   SubCutaneous three times a day before meals  insulin lispro (HumaLOG) corrective regimen sliding scale   SubCutaneous at bedtime  insulin lispro Injectable (HumaLOG) 4 Unit(s) SubCutaneous three times a day with meals  meclizine 37.5 milliGRAM(s) Oral every 8 hours  sodium chloride 0.9%. 1000 milliLiter(s) (75 mL/Hr) IV Continuous <Continuous>  sodium chloride 0.9%. 1000 milliLiter(s) (100 mL/Hr) IV Continuous <Continuous>    MEDICATIONS  (PRN):  dextrose 40% Gel 15 Gram(s) Oral once PRN Blood Glucose LESS THAN 70 milliGRAM(s)/deciliter  glucagon  Injectable 1 milliGRAM(s) IntraMuscular once PRN Glucose LESS THAN 70 milligrams/deciliter      Allergies    No Known Allergies    Intolerances        REVIEW OF SYSTEMS:  CONSTITUTIONAL: No fever or chills, admits generalized weakness  HEENT:  No headache, no sore throat  RESPIRATORY: No cough, wheezing, or shortness of breath  CARDIOVASCULAR: No chest pain, palpitations  GASTROINTESTINAL: No abd pain, nausea, vomiting, or diarrhea  GENITOURINARY: No dysuria, frequency, or hematuria  NEUROLOGICAL: no focal weakness, admits dizziness and right sided face and tongue decreased sensation, gait ataxia, Muscle strength 5/5 b/l UE and LE  MUSCULOSKELETAL: no myalgias     Vital Signs Last 24 Hrs  T(C): 36.8 (19 Nov 2019 15:23), Max: 37.1 (18 Nov 2019 19:32)  T(F): 98.2 (19 Nov 2019 15:23), Max: 98.8 (18 Nov 2019 19:32)  HR: 76 (19 Nov 2019 15:23) (71 - 90)  BP: 134/92 (19 Nov 2019 15:23) (134/92 - 156/110)  BP(mean): --  RR: 17 (19 Nov 2019 15:23) (17 - 20)  SpO2: 95% (19 Nov 2019 15:23) (93% - 95%)    PHYSICAL EXAM:  GENERAL: NAD, resting comfortably in bed, pleasant  HEENT:  anicteric, moist mucous membranes  CHEST/LUNG:  CTA b/l, no rales, wheezes, or rhonchi  HEART:  RRR, S1, S2  ABDOMEN:  BS+, soft, nontender, nondistended  EXTREMITIES: no edema, cyanosis, or calf tenderness  NERVOUS SYSTEM: answers questions and follows commands appropriately; +decreased sensation of right side of face in comparison to left, +RUE ataxia    LABS:                        17.8   10.65 )-----------( 200      ( 19 Nov 2019 06:04 )             50.3     CBC Full  -  ( 19 Nov 2019 06:04 )  WBC Count : 10.65 K/uL  Hemoglobin : 17.8 g/dL  Hematocrit : 50.3 %  Platelet Count - Automated : 200 K/uL  Mean Cell Volume : 91.8 fl  Mean Cell Hemoglobin : 32.5 pg  Mean Cell Hemoglobin Concentration : 35.4 gm/dL  Auto Neutrophil # : 7.68 K/uL  Auto Lymphocyte # : 1.92 K/uL  Auto Monocyte # : 0.73 K/uL  Auto Eosinophil # : 0.19 K/uL  Auto Basophil # : 0.05 K/uL  Auto Neutrophil % : 72.0 %  Auto Lymphocyte % : 18.0 %  Auto Monocyte % : 6.9 %  Auto Eosinophil % : 1.8 %  Auto Basophil % : 0.5 %    19 Nov 2019 06:04    137    |  106    |  34     ----------------------------<  202    3.9     |  25     |  1.40     Ca    8.6        19 Nov 2019 06:04          CAPILLARY BLOOD GLUCOSE      POCT Blood Glucose.: 186 mg/dL (19 Nov 2019 12:25)  POCT Blood Glucose.: 221 mg/dL (19 Nov 2019 09:16)  POCT Blood Glucose.: 187 mg/dL (19 Nov 2019 07:55)  POCT Blood Glucose.: 214 mg/dL (18 Nov 2019 21:33)  POCT Blood Glucose.: 196 mg/dL (18 Nov 2019 16:59)          RADIOLOGY & ADDITIONAL TESTS:    Personally reviewed.     Consultant(s) Notes Reviewed:  [x] YES  [ ] NO Patient is a 54y old  Male who presents with a chief complaint of possible stroke (19 Nov 2019 12:34)    HPI:  54M with PMH of HTN presents with dizziness for 4 days. States he feels like the room is spinning and has an unsteady gait. Reports intermittent dizziness for a 2 days but progressed to constant dizziness, he was unable to stand without holding unto objects near by. [facility] and couldn't walk without reaching and leaning on trees. States similar brief episode of dizziness last week but was able to go to work and symptoms resolved after a minute. Patient works as a  in [employer], skins and cuts fish, also does heavy loading. States diet consists of "eat on the go take out," including 7/11, bagels, soda, ice tea, deli food and chinese food. Admits to two episodes of non-bloody, non-bilious emesis and was unable to keep food down today, prompting urgent care evaluation. Patient was given Meclizine at the urgent care and was advised ED eval if symptoms persisted. Denies chest pain, palpitations, SOB, FRENCH,      admitted for stroke-like symptoms and vertebral artery stenosis/occlusion  now acute vs subacute cva -      INTERVAL HPI/OVERNIGHT EVENTS: Patient was seen and examined at bedside.   Patient reports he feels decreased sensation over the right side of his face and tongue.   Pt states he feels he is still very unsteady when trying to walk and is feeling generalized weakness.   admits to continuing dizziness and right sided hearing loss. Tolerating PO. Denies cp, SOB, n/v, headache, change in vision.    MEDICATIONS  (STANDING):  amoxicillin  875 milliGRAM(s)/clavulanate 1 Tablet(s) Oral two times a day  aspirin 325 milliGRAM(s) Oral daily  atorvastatin 80 milliGRAM(s) Oral at bedtime  carbamide peroxide Otic Solution 1 Drop(s) Both Ears two times a day  dextrose 5%. 1000 milliLiter(s) (50 mL/Hr) IV Continuous <Continuous>  dextrose 50% Injectable 12.5 Gram(s) IV Push once  dextrose 50% Injectable 25 Gram(s) IV Push once  dextrose 50% Injectable 25 Gram(s) IV Push once  enoxaparin Injectable 40 milliGRAM(s) SubCutaneous daily  insulin lispro (HumaLOG) corrective regimen sliding scale   SubCutaneous three times a day before meals  insulin lispro (HumaLOG) corrective regimen sliding scale   SubCutaneous at bedtime  insulin lispro Injectable (HumaLOG) 4 Unit(s) SubCutaneous three times a day with meals  meclizine 37.5 milliGRAM(s) Oral every 8 hours  sodium chloride 0.9%. 1000 milliLiter(s) (75 mL/Hr) IV Continuous <Continuous>  sodium chloride 0.9%. 1000 milliLiter(s) (100 mL/Hr) IV Continuous <Continuous>    MEDICATIONS  (PRN):  dextrose 40% Gel 15 Gram(s) Oral once PRN Blood Glucose LESS THAN 70 milliGRAM(s)/deciliter  glucagon  Injectable 1 milliGRAM(s) IntraMuscular once PRN Glucose LESS THAN 70 milligrams/deciliter        REVIEW OF SYSTEMS:  CONSTITUTIONAL: No fever or chills, admits generalized weakness  HEENT:  No headache, no sore throat  RESPIRATORY: No cough, wheezing, or shortness of breath  CARDIOVASCULAR: No chest pain, palpitations  GASTROINTESTINAL: No abd pain, nausea, vomiting, or diarrhea  GENITOURINARY: No dysuria, frequency, or hematuria  NEUROLOGICAL: no focal weakness, admits dizziness and right sided face and tongue decreased sensation, gait ataxia, Muscle strength 5/5 b/l UE and LE  MUSCULOSKELETAL: no myalgias     Vital Signs Last 24 Hrs  T(C): 36.8 (19 Nov 2019 15:23), Max: 37.1 (18 Nov 2019 19:32)  T(F): 98.2 (19 Nov 2019 15:23), Max: 98.8 (18 Nov 2019 19:32)  HR: 76 (19 Nov 2019 15:23) (71 - 90)  BP: 134/92 (19 Nov 2019 15:23) (134/92 - 156/110)  BP(mean): --  RR: 17 (19 Nov 2019 15:23) (17 - 20)  SpO2: 95% (19 Nov 2019 15:23) (93% - 95%)    PHYSICAL EXAM:  GENERAL: NAD, resting comfortably in bed, pleasant  HEENT:  anicteric, moist mucous membranes  CHEST/LUNG:  CTA b/l, no rales, wheezes, or rhonchi  HEART:  RRR, S1, S2  ABDOMEN:  BS+, soft, nontender, nondistended  EXTREMITIES: no edema, cyanosis, or calf tenderness  NERVOUS SYSTEM: answers questions and follows commands appropriately; +decreased sensation of right side of face in comparison to left, +RUE ataxia  gu intact   LABS:                        17.8   10.65 )-----------( 200      ( 19 Nov 2019 06:04 )             50.3     CBC Full  -  ( 19 Nov 2019 06:04 )  WBC Count : 10.65 K/uL  Hemoglobin : 17.8 g/dL  Hematocrit : 50.3 %  Platelet Count - Automated : 200 K/uL  Mean Cell Volume : 91.8 fl  Mean Cell Hemoglobin : 32.5 pg  Mean Cell Hemoglobin Concentration : 35.4 gm/dL  Auto Neutrophil # : 7.68 K/uL  Auto Lymphocyte # : 1.92 K/uL  Auto Monocyte # : 0.73 K/uL  Auto Eosinophil # : 0.19 K/uL  Auto Basophil # : 0.05 K/uL  Auto Neutrophil % : 72.0 %  Auto Lymphocyte % : 18.0 %  Auto Monocyte % : 6.9 %  Auto Eosinophil % : 1.8 %  Auto Basophil % : 0.5 %    19 Nov 2019 06:04    137    |  106    |  34     ----------------------------<  202    3.9     |  25     |  1.40     Ca    8.6        19 Nov 2019 06:04          CAPILLARY BLOOD GLUCOSE      POCT Blood Glucose.: 186 mg/dL (19 Nov 2019 12:25)  POCT Blood Glucose.: 221 mg/dL (19 Nov 2019 09:16)  POCT Blood Glucose.: 187 mg/dL (19 Nov 2019 07:55)  POCT Blood Glucose.: 214 mg/dL (18 Nov 2019 21:33)  POCT Blood Glucose.: 196 mg/dL (18 Nov 2019 16:59)          RADIOLOGY & ADDITIONAL TESTS:    Personally reviewed.     Consultant(s) Notes Reviewed:  [x] YES  [ ] NO

## 2019-11-19 NOTE — CONSULT NOTE ADULT - SUBJECTIVE AND OBJECTIVE BOX
Patient is a 54y old  Male who presents with a chief complaint of possible stroke (19 Nov 2019 15:44)    HPI:  54M with PMH of HTN presents with dizziness and unsteady gait. Admitted for 11/14 and work-up initially negative with CTH/MRI/MRA without acute findings. MRI yesterday demonstrated new acute CVA of right brachium pontis. Contacted by PMD for new evolving infarct with persistent neurologic symptoms    At bedside patient admits to some right sided facial "tingling" and difficulty with coordination of RUE. Attempted to walk with PT earlier however difficulty with gait. Feels symptoms have stayed about the same since his admission. Denies any headache, vision loss, confusion,     Allergies: No Known Allergies    PAST MEDICAL & SURGICAL HISTORY:  HTN (hypertension)  No significant past surgical history    FAMILY HISTORY:  No pertinent family history in first degree relatives    SOCIAL HISTORY:    Home Medications:    Review of Systems:  Constitutional: no fever, chills, fatigue  Neuro: +difficulty walking, +right facial tingling, +difficulty cooridnation, no headache, numbness, weakness  Resp: no cough, wheezing, shortness of breath  CVS: no chest pain, palpitations, leg swelling  GI: no abdominal pain, nausea, vomiting, diarrhea   : no dysuria, frequency, incontinence  Skin: no itching, burning, rashes, or lesions   Msk: no joint pain or swelling  Psych: no depression, anxiety, mood swings    T(F): 98.2 (11-19-19 @ 15:23), Max: 98.8 (11-18-19 @ 19:32)  HR: 76 (11-19-19 @ 15:23) (71 - 90)  BP: 134/92 (11-19-19 @ 15:23) (134/92 - 156/110)  RR: 17 (11-19-19 @ 15:23) (17 - 20)  SpO2: 95% (11-19-19 @ 15:23)  Wt(kg): --    CAPILLARY BLOOD GLUCOSE      POCT Blood Glucose.: 186 mg/dL (19 Nov 2019 12:25)    I&O's Summary    18 Nov 2019 07:01  -  19 Nov 2019 07:00  --------------------------------------------------------  IN: 200 mL / OUT: 0 mL / NET: 200 mL    19 Nov 2019 07:01  -  19 Nov 2019 17:18  --------------------------------------------------------  IN: 120 mL / OUT: 0 mL / NET: 120 mL        Physical Exam:     Gen:  Neuro:  HEENT:  CVS:  Resp:  Abd:  Ext:  Skin:    Meds:  amoxicillin  875 milliGRAM(s)/clavulanate 1 Tablet(s) Oral two times a day       atorvastatin 80 milliGRAM(s) Oral at bedtime  dextrose 40% Gel 15 Gram(s) Oral once PRN  dextrose 50% Injectable 12.5 Gram(s) IV Push once  dextrose 50% Injectable 25 Gram(s) IV Push once  dextrose 50% Injectable 25 Gram(s) IV Push once  glucagon  Injectable 1 milliGRAM(s) IntraMuscular once PRN  insulin lispro (HumaLOG) corrective regimen sliding scale   SubCutaneous three times a day before meals  insulin lispro (HumaLOG) corrective regimen sliding scale   SubCutaneous at bedtime  insulin lispro Injectable (HumaLOG) 4 Unit(s) SubCutaneous three times a day with meals        aspirin 325 milliGRAM(s) Oral daily  meclizine 37.5 milliGRAM(s) Oral every 8 hours        enoxaparin Injectable 40 milliGRAM(s) SubCutaneous daily           dextrose 5%. 1000 milliLiter(s) IV Continuous <Continuous>  sodium chloride 0.9%. 1000 milliLiter(s) IV Continuous <Continuous>  sodium chloride 0.9%. 1000 milliLiter(s) IV Continuous <Continuous>        carbamide peroxide Otic Solution 1 Drop(s) Both Ears two times a day                              17.8   10.65 )-----------( 200      ( 19 Nov 2019 06:04 )             50.3       11-19    137  |  106  |  34<H>  ----------------------------<  202<H>  3.9   |  25  |  1.40<H>    Ca    8.6      19 Nov 2019 06:04                        Radiology:   EXAM:  MR BRAIN                            PROCEDURE DATE:  11/18/2019          INTERPRETATION:  CLINICAL INDICATION: Dizziness, unsteady gait. Evaluate   for CVA.    TECHNIQUE: Multi-planar multi-sequential MR imaging of the brain was   performed without intravenous contrast.    COMPARISON: Brain MRI, MR angiography head and neck 11/15/2019.    FINDINGS:    There is hyperintensity on diffusion-weighted sequence with associated   signal dropout on corresponding ADC maps in the right brachium pontis   (series 4, images 8-10), findings compatible with evolving acute infarct.   There is no associated hemorrhage.    There are scattered T2/FLAIR hyperintense changes in the periventricular   and subcortical white matter which are nonspecific finding, but most   likely represent sequelae of mild to moderate chronic microvascular   ischemic disease. There is mild diffuse cortical sulcal prominence   related to underlying brain parenchymal volume loss.    There is no intracranial mass. The ventricles are normal without evidence   of hydrocephalus. There are no extra-axial fluid collections. The skull   base flow voids are present.  There is an enlarged, partially empty sella turcica.      The visualized intraorbital contents are normal. There are mild mucosal   inflammatory changes of the ethmoid air cells. The mastoid air cells are   clear. The visualized soft tissues and osseous structures appear   unremarkable.    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 Noassociated   hemorrhage.    These findings were discussed with Dr. Pang of the patient's clinical   team on 11/18/2019 at approximately 2:00 PM.                ARPAN SALDANA M.D., ATTENDING RADIOLOGIST  This document has been electronically signed. Nov 18 2019  2:11PM      Assessment/Plan:  53 yo M pmhx HTN presented with dizziness and admitted for unsteady gait. MRI now reveals acute CVA of right brachium pontis    -Initial imaging negative however repeat MRI now significant for acute evolving CVA.  -Neuro following case. Would allow for permissive HTN. Hold all anti-HTN. Repeat imaging at neuro descretion.  -High dose ASA and Statin daily    Patient does not require ICU admission at this time. Discussed with Intensivist Dr. Norris

## 2019-11-20 LAB
ANION GAP SERPL CALC-SCNC: 9 MMOL/L — SIGNIFICANT CHANGE UP (ref 5–17)
BASOPHILS # BLD AUTO: 0.04 K/UL — SIGNIFICANT CHANGE UP (ref 0–0.2)
BASOPHILS NFR BLD AUTO: 0.3 % — SIGNIFICANT CHANGE UP (ref 0–2)
BUN SERPL-MCNC: 30 MG/DL — HIGH (ref 7–23)
CALCIUM SERPL-MCNC: 7.7 MG/DL — LOW (ref 8.5–10.1)
CHLORIDE SERPL-SCNC: 110 MMOL/L — HIGH (ref 96–108)
CO2 SERPL-SCNC: 21 MMOL/L — LOW (ref 22–31)
CREAT SERPL-MCNC: 1.2 MG/DL — SIGNIFICANT CHANGE UP (ref 0.5–1.3)
EOSINOPHIL # BLD AUTO: 0.11 K/UL — SIGNIFICANT CHANGE UP (ref 0–0.5)
EOSINOPHIL NFR BLD AUTO: 0.9 % — SIGNIFICANT CHANGE UP (ref 0–6)
GLUCOSE SERPL-MCNC: 158 MG/DL — HIGH (ref 70–99)
HCT VFR BLD CALC: 49.5 % — SIGNIFICANT CHANGE UP (ref 39–50)
HGB BLD-MCNC: 17.4 G/DL — HIGH (ref 13–17)
IMM GRANULOCYTES NFR BLD AUTO: 0.8 % — SIGNIFICANT CHANGE UP (ref 0–1.5)
LYMPHOCYTES # BLD AUTO: 1.47 K/UL — SIGNIFICANT CHANGE UP (ref 1–3.3)
LYMPHOCYTES # BLD AUTO: 12 % — LOW (ref 13–44)
MCHC RBC-ENTMCNC: 32.6 PG — SIGNIFICANT CHANGE UP (ref 27–34)
MCHC RBC-ENTMCNC: 35.2 GM/DL — SIGNIFICANT CHANGE UP (ref 32–36)
MCV RBC AUTO: 92.7 FL — SIGNIFICANT CHANGE UP (ref 80–100)
MONOCYTES # BLD AUTO: 0.71 K/UL — SIGNIFICANT CHANGE UP (ref 0–0.9)
MONOCYTES NFR BLD AUTO: 5.8 % — SIGNIFICANT CHANGE UP (ref 2–14)
NEUTROPHILS # BLD AUTO: 9.8 K/UL — HIGH (ref 1.8–7.4)
NEUTROPHILS NFR BLD AUTO: 80.2 % — HIGH (ref 43–77)
NRBC # BLD: 0 /100 WBCS — SIGNIFICANT CHANGE UP (ref 0–0)
PLATELET # BLD AUTO: 212 K/UL — SIGNIFICANT CHANGE UP (ref 150–400)
POTASSIUM SERPL-MCNC: 3.6 MMOL/L — SIGNIFICANT CHANGE UP (ref 3.5–5.3)
POTASSIUM SERPL-SCNC: 3.6 MMOL/L — SIGNIFICANT CHANGE UP (ref 3.5–5.3)
RBC # BLD: 5.34 M/UL — SIGNIFICANT CHANGE UP (ref 4.2–5.8)
RBC # FLD: 11.9 % — SIGNIFICANT CHANGE UP (ref 10.3–14.5)
SODIUM SERPL-SCNC: 140 MMOL/L — SIGNIFICANT CHANGE UP (ref 135–145)
WBC # BLD: 12.23 K/UL — HIGH (ref 3.8–10.5)
WBC # FLD AUTO: 12.23 K/UL — HIGH (ref 3.8–10.5)

## 2019-11-20 PROCEDURE — 70450 CT HEAD/BRAIN W/O DYE: CPT | Mod: 26

## 2019-11-20 PROCEDURE — 99232 SBSQ HOSP IP/OBS MODERATE 35: CPT

## 2019-11-20 PROCEDURE — 99233 SBSQ HOSP IP/OBS HIGH 50: CPT

## 2019-11-20 RX ORDER — ACETAMINOPHEN 500 MG
650 TABLET ORAL ONCE
Refills: 0 | Status: COMPLETED | OUTPATIENT
Start: 2019-11-20 | End: 2019-11-20

## 2019-11-20 RX ADMIN — Medication 4 UNIT(S): at 17:43

## 2019-11-20 RX ADMIN — ATORVASTATIN CALCIUM 80 MILLIGRAM(S): 80 TABLET, FILM COATED ORAL at 21:37

## 2019-11-20 RX ADMIN — Medication 4 UNIT(S): at 13:22

## 2019-11-20 RX ADMIN — Medication 1 DROP(S): at 21:37

## 2019-11-20 RX ADMIN — Medication 1 DROP(S): at 06:27

## 2019-11-20 RX ADMIN — CARBAMIDE PEROXIDE 1 DROP(S): 81.86 SOLUTION/ DROPS AURICULAR (OTIC) at 06:27

## 2019-11-20 RX ADMIN — Medication 1 TABLET(S): at 06:27

## 2019-11-20 RX ADMIN — ENOXAPARIN SODIUM 40 MILLIGRAM(S): 100 INJECTION SUBCUTANEOUS at 11:09

## 2019-11-20 RX ADMIN — CARBAMIDE PEROXIDE 1 DROP(S): 81.86 SOLUTION/ DROPS AURICULAR (OTIC) at 17:18

## 2019-11-20 RX ADMIN — Medication 650 MILLIGRAM(S): at 22:49

## 2019-11-20 RX ADMIN — Medication 37.5 MILLIGRAM(S): at 21:37

## 2019-11-20 RX ADMIN — Medication 325 MILLIGRAM(S): at 11:08

## 2019-11-20 RX ADMIN — Medication 37.5 MILLIGRAM(S): at 13:26

## 2019-11-20 RX ADMIN — Medication 650 MILLIGRAM(S): at 23:00

## 2019-11-20 RX ADMIN — Medication 2: at 13:22

## 2019-11-20 RX ADMIN — Medication 37.5 MILLIGRAM(S): at 06:26

## 2019-11-20 RX ADMIN — SODIUM CHLORIDE 100 MILLILITER(S): 9 INJECTION INTRAMUSCULAR; INTRAVENOUS; SUBCUTANEOUS at 06:28

## 2019-11-20 RX ADMIN — Medication 1 TABLET(S): at 17:18

## 2019-11-20 RX ADMIN — Medication 4 UNIT(S): at 08:28

## 2019-11-20 RX ADMIN — Medication 0: at 21:37

## 2019-11-20 RX ADMIN — Medication 2: at 08:27

## 2019-11-20 NOTE — DISCHARGE NOTE PROVIDER - HOSPITAL COURSE
FROM ADMISSION H+P:     HPI:    54M with PMH of HTN presents with dizziness for 4 days. States he feels like the room is spinning and has an unsteady gait. Reports intermittent dizziness for a 2 days but progressed to constant dizziness, he was unable to stand without holding unto objects near by. [facility] and couldn't walk without reaching and leaning on trees. States similar brief episode of dizziness last week but was able to go to work and symptoms resolved after a minute. Patient works as a  in [employer], skins and cuts fish, also does heavy loading. States diet consists of "eat on the go take out," including 7/11, bagels, soda, ice tea, deli food and chinese food. Admits to two episodes of non-bloody, non-bilious emesis and was unable to keep food down today, prompting urgent care evaluation. Patient was given Meclizine at the urgent care and was advised ED eval if symptoms persisted. Denies chest pain, palpitations, SOB, FRENCH, abd pain, c/d. Denies ear pain, sore throat. States his form of exercise is walking, as patient does not have a car.         In the ED: Temp 98.1, HR 88, /102, RR 16, SpO2 100% RA.     H/H: 18.4/51.6, glucose 267.     CT head: No acute hemorrhage or mass effect.    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    NIH Stroke Scale: 0, EKG: NSR 94    Received ASA 325mg, Augmentin x1, 1L NS bolus, Valium 5mg x1, Meclizine 25mg x1, Solu-Medrol 125mg IV x1. (14 Nov 2019 22:53)        ---    HOSPITAL COURSE:     Patient admitted to Telemetry for r/o CVA.  Dr. Pang, neurology was consulted, initial MRI read was negative for acute CVA however there was restricted diffusion involving     right cerebellum consistent with subacute infarct, as patient symptoms worsened a followup MRI was obtained revealing an evolving subacute right brachium pontis cva. Patient was started on plavix, statin, TSH WNL.  Echo performed which was grossly normal. Symptoms continued to worsen in spite of permissive HTN and repeat CT was performed ruling out hemorrhagic conversion.        Regarding diabetes: ON admission patient HbA1c was found to be 9.5; with no prior diagnosis of diabetes.  Patient started on sliding scale insulin, and given diabetes education.        ---    CONSULTANTS:     Neuro: Poly    Cardio: Raffi    ---    TIME SPENT:    The total amount of time spent reviewing the hospital notes, laboratory values, imaging findings, assessing/counseling the patient, discussing with consultant physicians, social work, nursing staff took -- minutes        ---    Primary care provider was made aware of plan for discharge:      [  ] NO     [  ] YES FROM ADMISSION H+P:     HPI:    54M with PMH of HTN presents with dizziness for 4 days. States he feels like the room is spinning and has an unsteady gait. Reports intermittent dizziness for a 2 days but progressed to constant dizziness, he was unable to stand without holding unto objects near by. [facility] and couldn't walk without reaching and leaning on trees. States similar brief episode of dizziness last week but was able to go to work and symptoms resolved after a minute. Patient works as a  in [employer], skins and cuts fish, also does heavy loading. States diet consists of "eat on the go take out," including 7/11, bagels, soda, ice tea, deli food and chinese food. Admits to two episodes of non-bloody, non-bilious emesis and was unable to keep food down today, prompting urgent care evaluation. Patient was given Meclizine at the urgent care and was advised ED eval if symptoms persisted. Denies chest pain, palpitations, SOB, FRENCH, abd pain, c/d. Denies ear pain, sore throat. States his form of exercise is walking, as patient does not have a car.         In the ED: Temp 98.1, HR 88, /102, RR 16, SpO2 100% RA.     H/H: 18.4/51.6, glucose 267.     CT head: No acute hemorrhage or mass effect.    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    NIH Stroke Scale: 0, EKG: NSR 94    Received ASA 325mg, Augmentin x1, 1L NS bolus, Valium 5mg x1, Meclizine 25mg x1, Solu-Medrol 125mg IV x1. (14 Nov 2019 22:53)        ---    HOSPITAL COURSE:     Patient admitted to Telemetry for r/o CVA.  Dr. Pang, neurology was consulted, initial MRI read was negative for acute CVA however there was restricted diffusion involving     right cerebellum consistent with subacute infarct, as patient symptoms worsened a followup MRI was obtained revealing an evolving subacute right brachium pontis cva. Patient was started on plavix, statin, TSH WNL.  Echo performed which was grossly normal. Symptoms continued to worsen in spite of permissive HTN and repeat CT was performed ruling out hemorrhagic conversion.        Regarding diabetes: ON admission patient HbA1c was found to be 9.5; with no prior diagnosis of diabetes.  Patient started on sliding scale insulin, and given diabetes education.        The patient was seen and examined on the day of discharge by the attending physician. Pt stable for discharge. Please see discharge note for additional information regarding the hospital course and the day of discharge.         T(C): 36.8 (11-21-19 @ 08:35), Max: 37 (11-20-19 @ 20:16)    HR: 71 (11-21-19 @ 08:35) (58 - 85)    BP: 150/104 (11-21-19 @ 08:35) (134/99 - 168/115)    RR: 18 (11-21-19 @ 08:35) (18 - 19)    SpO2: 93% (11-21-19 @ 08:35) (93% - 98%)        GENERAL: NAD, resting comfortably, appropriate    EYES: sclera clear, no exudates    ENMT: oropharynx clear without erythema, no exudates, moist mucous membranes    LUNGS: good air entry bilaterally, clear to auscultation, symmetric breath sounds, no wheezing or rhonchi appreciated    HEART: soft S1/S2, regular rate and rhythm, no murmurs noted, no lower extremity edema    GASTROINTESTINAL: abdomen is soft, nontender, nondistended, normoactive bowel sounds, no palpable masses    INTEGUMENT: good skin turgor, warm skin, appears well perfused    MUSCULOSKELETAL: no clubbing or cyanosis, no obvious deformity    NEUROLOGIC: awake, alert, oriented x3, good muscle tone in 4 extremities, decreased sensation to right side of face and tongue, +ataxia RUE, muscle strength 5/5 b/l UE and LE, +right facial droop(lip)    PSYCHIATRIC: mood is good, affect is congruent, linear and logical thought process    HEME/LYMPH: no palpable supraclavicular nodules, no obvious ecchymosis or petechiae         ---    CONSULTANTS:     Neuro: Poly    Cardio: Raffi FROM ADMISSION H+P:     HPI:    54M with PMH of HTN presents with dizziness for 4 days. States he feels like the room is spinning and has an unsteady gait. Reports intermittent dizziness for a 2 days but progressed to constant dizziness, he was unable to stand without holding unto objects near by. [facility] and couldn't walk without reaching and leaning on trees. States similar brief episode of dizziness last week but was able to go to work and symptoms resolved after a minute. Patient works as a  in [employer], skins and cuts fish, also does heavy loading. States diet consists of "eat on the go take out," including 7/11, bagels, soda, ice tea, deli food and chinese food. Admits to two episodes of non-bloody, non-bilious emesis and was unable to keep food down today, prompting urgent care evaluation. Patient was given Meclizine at the urgent care and was advised ED eval if symptoms persisted. Denies chest pain, palpitations, SOB, FRENCH, abd pain, c/d. Denies ear pain, sore throat. States his form of exercise is walking, as patient does not have a car.         In the ED: Temp 98.1, HR 88, /102, RR 16, SpO2 100% RA.     H/H: 18.4/51.6, glucose 267.     CT head: No acute hemorrhage or mass effect.    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    NIH Stroke Scale: 0, EKG: NSR 94    Received ASA 325mg, Augmentin x1, 1L NS bolus, Valium 5mg x1, Meclizine 25mg x1, Solu-Medrol 125mg IV x1. (14 Nov 2019 22:53)        ---    HOSPITAL COURSE:     Patient admitted to Telemetry for r/o CVA.  Dr. Pang, neurology was consulted, initial MRI read was negative for acute CVA however there was restricted diffusion involving     right cerebellum consistent with subacute infarct, as patient symptoms worsened a followup MRI was obtained revealing an evolving subacute right brachium pontis cva. Patient was started on plavix, statin, TSH WNL.  Echo performed which was grossly normal. Symptoms continued to worsen in spite of permissive HTN and repeat CT was performed ruling out hemorrhagic conversion.        Regarding diabetes: ON admission patient HbA1c was found to be 9.5; with no prior diagnosis of diabetes.  Patient started on sliding scale insulin, and given diabetes education.        The patient was seen and examined on the day of discharge by the attending physician. Pt stable for discharge to acute rehab. Please see discharge note for additional information regarding the hospital course and the day of discharge.         T(C): 36.8 (11-21-19 @ 08:35), Max: 37 (11-20-19 @ 20:16)    HR: 71 (11-21-19 @ 08:35) (58 - 85)    BP: 150/104 (11-21-19 @ 08:35) (134/99 - 168/115)    RR: 18 (11-21-19 @ 08:35) (18 - 19)    SpO2: 93% (11-21-19 @ 08:35) (93% - 98%)        GENERAL: NAD, resting comfortably, appropriate    EYES: sclera clear, no exudates    ENMT: oropharynx clear without erythema, no exudates, moist mucous membranes    LUNGS: good air entry bilaterally, clear to auscultation, symmetric breath sounds, no wheezing or rhonchi appreciated    HEART: soft S1/S2, regular rate and rhythm, no murmurs noted, no lower extremity edema    GASTROINTESTINAL: abdomen is soft, nontender, nondistended, normoactive bowel sounds, no palpable masses    INTEGUMENT: good skin turgor, warm skin, appears well perfused    MUSCULOSKELETAL: no clubbing or cyanosis, no obvious deformity    NEUROLOGIC: awake, alert, oriented x3, good muscle tone in 4 extremities, decreased sensation to right side of face and tongue, +ataxia RUE, muscle strength 5/5 b/l UE and LE, +right facial droop(lip)    PSYCHIATRIC: mood is good, affect is congruent, linear and logical thought process    HEME/LYMPH: no palpable supraclavicular nodules, no obvious ecchymosis or petechiae         ---    CONSULTANTS:     Neuro: Poly    Cardio: Raffi

## 2019-11-20 NOTE — PROGRESS NOTE ADULT - ASSESSMENT
55 y/o M with pmh DM, HTN presented with dizziness since Wednesday, found to have acute CVA.  MRA head showed an evolving infarct in the right brachium pontis, though, no hemorrhage.    Acute CVA  - Neuro following  - Continue ASA and statin  - Continue tele monitoring for occult Afib.  NSR on tele.  His EKG showed NSR with non-specific TWI in lead lll  - Allow permissive hypertension per Neuro recs- meds held   -Continue telemetry monitoring, plan for ILR outpatient to rule out occult afib    DM  -management as per primary     He has no evidence of cardiac ischemia or volume overload  Monitor electrolytes, replete to keep K>4 and Mag>2    Will follow closely  Further cardiac w/u pending clinical course  All other w/u per Primary and Neuro    Faustina Rivera FNP-C  Cardiology NP  [facility] [phone]

## 2019-11-20 NOTE — DISCHARGE NOTE PROVIDER - CARE PROVIDER_API CALL
Teddy Gant)  Cardiology  [address] [facility], [address] [place] [address]  Phone: [phone]  Fax: [phone]  Follow Up Time: Teddy Gant)  Cardiology  [address] [facility], [address]  [place] [address]  Phone: [phone]  Fax: [phone]  Follow Up Time:     Caron Pang)  Neurology  [address] [place] [address]  Phone: [phone]  Fax: [phone]  Follow Up Time:

## 2019-11-20 NOTE — DISCHARGE NOTE PROVIDER - NSDCMRMEDTOKEN_GEN_ALL_CORE_FT
amLODIPine 5 mg oral tablet: 1 tab(s) orally once a day  aspirin 325 mg oral tablet: 1 tab(s) orally once a day  atorvastatin 80 mg oral tablet: 1 tab(s) orally once a day (at bedtime)  glucometer:   glucometer lancets:   glucometer test strips: 1 strip(s) subcutaneously 2 times a day  as per insurance   meclizine 12.5 mg oral tablet: 3 tab(s) orally every 8 hours  metFORMIN 500 mg oral tablet: 1 tab(s) orally 2 times a day   Metoprolol Tartrate 100 mg oral tablet: amLODIPine 5 mg oral tablet: 1 tab(s) orally once a day  amoxicillin-clavulanate 875 mg-125 mg oral tablet: 1 tab(s) orally 2 times a day. Final Dose to be given 6pm on 11/21  aspirin 325 mg oral tablet: 1 tab(s) orally once a day  atorvastatin 80 mg oral tablet: 1 tab(s) orally once a day (at bedtime)  glucometer:   glucometer lancets:   glucometer test strips: 1 strip(s) subcutaneously 2 times a day  as per insurance   meclizine 12.5 mg oral tablet: 3 tab(s) orally every 8 hours  metFORMIN 500 mg oral tablet: 1 tab(s) orally 2 times a day   Metoprolol Tartrate 100 mg oral tablet: amoxicillin-clavulanate 875 mg-125 mg oral tablet: 1 tab(s) orally 2 times a day. Final Dose to be given 6pm on 11/21  aspirin 325 mg oral tablet: 1 tab(s) orally once a day  atorvastatin 80 mg oral tablet: 1 tab(s) orally once a day (at bedtime)  enoxaparin: 40 unit(s) subcutaneous once a day  insulin lispro (concentrated) 200 units/mL subcutaneous solution:  subcutaneous . Sliding scale   insulin lispro (concentrated) 200 units/mL subcutaneous solution: 4 unit(s) subcutaneous 3 times a day (before meals)  meclizine 12.5 mg oral tablet: 3 tab(s) orally every 8 hours  ocular lubricant ophthalmic solution: 1 drop(s) to each affected eye 3 times a day

## 2019-11-20 NOTE — DISCHARGE NOTE PROVIDER - NSDCCPCAREPLAN_GEN_ALL_CORE_FT
PRINCIPAL DISCHARGE DIAGNOSIS  Diagnosis: Cerebrovascular accident (CVA)  Assessment and Plan of Treatment: You were diagnosed with a CVA(cerebrovascular accident) also known as a stoke.  You had imaging done of you head such as an MRI that showed this. You were started on aspirin and a statin and are to continu ethese medications.  - Please follow up with you primary care doctor in 1-2days following discharge  - Please follow up with a neurologist in 1-2days following discharge        SECONDARY DISCHARGE DIAGNOSES  Diagnosis: HTN (hypertension)  Assessment and Plan of Treatment: You blood pressure medications were stopped while you were in the hospital due to your stroke. Because you had a stroke it is important for your SBP(the upper number of your blood pressure) to be greater than 160.  -Follow up with your primary care doctor in 1-2days following discharge to discuss when to resume your medications fo rblood pressure and to dicuss dosage.    Diagnosis: Vertebral artery disease  Assessment and Plan of Treatment: Your MRI showed likely chronic vertebral artery disease.  -Follow up with a neurologist 1-2days following discharge.    Diagnosis: Diabetes  Assessment and Plan of Treatment: You were diagnosed with diabetes while in the hospital.  -Follow up with your [facility] care doctor in 1-2day following discharge to further manage your diabetes. PRINCIPAL DISCHARGE DIAGNOSIS  Diagnosis: Cerebrovascular accident (CVA)  Assessment and Plan of Treatment: You were diagnosed with a CVA(cerebrovascular accident) also known as a stoke.  You had imaging done of you head such as an MRI that showed this. You were started on aspirin and a statin and are to continu ethese medications.  - Please follow up with you primary care doctor in 1-2days following discharge  - Please follow up with a neurologist in 1-2days following discharge        SECONDARY DISCHARGE DIAGNOSES  Diagnosis: HTN (hypertension)  Assessment and Plan of Treatment: You blood pressure medications were stopped while you were in the hospital due to your stroke. Because you had a stroke it is important for your SBP(the upper number of your blood pressure) to be greater than 160.  -Follow up with your primary care doctor in 1-2days following discharge to discuss when to resume your medications for blood pressure and to dicuss dosage.    Diagnosis: Vertebral artery disease  Assessment and Plan of Treatment: Your MRI showed likely chronic vertebral artery disease.  -Follow up with a neurologist 1-2days following discharge.    Diagnosis: Diabetes  Assessment and Plan of Treatment: You were diagnosed with diabetes while in the hospital.  -Follow up with your [facility] care doctor in 1-2day following discharge to further manage your diabetes.

## 2019-11-20 NOTE — DISCHARGE NOTE PROVIDER - NSDCFUADDAPPT_GEN_ALL_CORE_FT
Follow up with your primary care doctor Dr. Gant in 1-2days following discahrge. Follow up with your primary care doctor Dr. Gant in 1-2days following discharge.  Follow up with a neurologist in 1-2days following discharge.

## 2019-11-20 NOTE — CHART NOTE - NSCHARTNOTEFT_GEN_A_CORE
Called by RN for Pt with elevated BP of 160/102. Patient seen and examined at bedside. Repeat manual BP of 160/98. States he has had a headache for the past day. Denies chest pain, shortness of breath, abdominal pain, dizziness, or nausea/vomiting      T(C): 36.7 (11-21-19 @ 00:52), Max: 37 (11-20-19 @ 20:16)  HR: 76 (11-21-19 @ 00:52) (58 - 86)  BP: 153/104 (11-21-19 @ 00:52) (134/95 - 167/81)  RR: 18 (11-21-19 @ 00:52) (18 - 22)  SpO2: 96% (11-21-19 @ 00:52) (94% - 96%)  Wt(kg): --    Physical Exam:  Gen: well appearing, NAD  HEENT: NCAT, PEERLA b/l, EOMI b/l, no conjunctival erythema  Cardio: regular rate and rhythm, +s1s2, no murmurs, rubs, or gallops  Pulm: CTA b/l, no wheezes, rales or rhonchi  Abdomen: soft, nontender, nondistended, +BS x4 quadrants, no guarding  Extremities: no clubbing, cyanosis or edema, +2 pedal pulses  Neuro: AAOx3, right sided facial droop, with decreased sensation in right side of face, unchanged from prior exam  Skin: warm and dry    Assessment/Plan  54M with PMH of HTN presents with dizziness for 4 days admitted for stroke-like symptoms and vertebral artery stenosis/occlusion  with new  subacute   vs acute  rt cerebellar  ischemic  cva     1. Hypertension:  - as per neuro recs and day team notes, allowing for permissive hypertension with goal SBP of >160  - repeat CT head from today - No acute intracranial hemorrhage, mass effect, or shift of   the midline structures  - will give tylenol at this time for headache  - will continue to follow, RN to call with any changes

## 2019-11-20 NOTE — PROGRESS NOTE ADULT - PROBLEM SELECTOR PLAN 7
IMPROVE VTE Individual Risk Assessment: DVT prophylaxis: Lovenox 40mg          RISK                                                          Points  [ X] Previous VTE                                                3  [  ] Thrombophilia                                             2  [  ] Lower limb paralysis                                   2        (unable to hold up >15 seconds)    [  ] Current Cancer                                             2         (within 6 months)  [  ] Immobilization > 24 hrs                              1  [  ] ICU/CCU stay > 24 hours                             1  [  ] Age > 60                                                         1    IMPROVE VTE Score: 3

## 2019-11-20 NOTE — PROGRESS NOTE ADULT - PROBLEM SELECTOR PLAN 1
Patient with persistent dizziness for 4days for prior  to admission - Antivert 37.5 mg tid.  -ruled out cva  and tia  by 1st mri    but persistent  dizziness and vertigo   so  repeated second mri   brain and mra   found  acute vs subacute rt cerebellar cva -  nih 2 ,   - c/w ASA 325mg daily , neurocheck , dvt prophy(lovenox) , pt evaluation , lipid profile and hba1c done   -MRA head and neck shows vertebral artery calcifications and near complete stenosis  -MRI showing no acute disease, near complete occlusion likely congenital or chronic  -Hemoglobin A1C- 9.5  -TSH- 0.64  -Cholesterol- 310, LDL- 239, HDL- 49  -Speech and swallow evaluation- recommended regular solids with thin liquids  - Neuro recs(Dr. Pang) appreciated: goal SBP is over 160  - s/p 500cc NS bolus yesterday, followed with 100ml/hr NS for 24hrs  - Cardio (Dr. Morales's group) recs appreciated   - TTE done: LVEF65%, trace MR and trace TR   - c/w aspirin and statin  - OT recs Acute Rehab on discharge likely tomorrow Patient with persistent dizziness for 4days for prior  to admission - Antivert 37.5 mg tid.  -ruled out cva  and tia  by 1st mri    but persistent  dizziness and vertigo   so  repeated second mri   brain and mra   found  acute vs subacute rt cerebellar cva -  nih 2 ,   - c/w ASA 325mg daily , neurocheck , dvt prophy(lovenox) , pt evaluation , lipid profile and hba1c done   -MRA head and neck shows vertebral artery calcifications and near complete stenosis  -MRI showing no acute disease, near complete occlusion likely congenital or chronic  -Hemoglobin A1C- 9.5  -TSH- 0.64  -Cholesterol- 310, LDL- 239, HDL- 49  -Speech and swallow evaluation- recommended regular solids with thin liquids  - Neuro recs(Dr. Pang) appreciated: goal SBP is over 160 (500cc NS blous if needed)  -F/U CT head  - s/p 500cc NS bolus yesterday, followed with 100ml/hr NS for 24hrs  - Cardio (Dr. Morales's group) recs appreciated   - TTE done: LVEF65%, trace MR and trace TR   - c/w aspirin and statin  - OT recs Acute Rehab on discharge likely tomorrow

## 2019-11-20 NOTE — PROGRESS NOTE ADULT - NSHPATTENDINGPLANDISCUSS_GEN_ALL_CORE
pt / nurse / resident
Patient, Neuro, SW/CM, Residents
pt / nurse / resident
pt / nurse / resident

## 2019-11-20 NOTE — PROGRESS NOTE ADULT - SUBJECTIVE AND OBJECTIVE BOX
[facility] -- Tom Morales, Micheline, Regan, Raffi Ordoñez Savella  Office # [phone]      Follow Up:    CVA  Subjective/Observations:   No complaints of chest pain, dyspnea, or palpitations reported. No signs of orthopnea or PND.  + headache and intermittent dizziness     REVIEW OF SYSTEMS: All other review of systems is negative unless indicated above    PAST MEDICAL & SURGICAL HISTORY:  HTN (hypertension)  No significant past surgical history      MEDICATIONS  (STANDING):  amoxicillin  875 milliGRAM(s)/clavulanate 1 Tablet(s) Oral two times a day  artificial tears (preservative free) Ophthalmic Solution 1 Drop(s) Left EYE three times a day  aspirin 325 milliGRAM(s) Oral daily  atorvastatin 80 milliGRAM(s) Oral at bedtime  carbamide peroxide Otic Solution 1 Drop(s) Both Ears two times a day  dextrose 5%. 1000 milliLiter(s) (50 mL/Hr) IV Continuous <Continuous>  dextrose 50% Injectable 12.5 Gram(s) IV Push once  dextrose 50% Injectable 25 Gram(s) IV Push once  dextrose 50% Injectable 25 Gram(s) IV Push once  enoxaparin Injectable 40 milliGRAM(s) SubCutaneous daily  insulin lispro (HumaLOG) corrective regimen sliding scale   SubCutaneous three times a day before meals  insulin lispro (HumaLOG) corrective regimen sliding scale   SubCutaneous at bedtime  insulin lispro Injectable (HumaLOG) 4 Unit(s) SubCutaneous three times a day with meals  meclizine 37.5 milliGRAM(s) Oral every 8 hours  sodium chloride 0.9%. 1000 milliLiter(s) (75 mL/Hr) IV Continuous <Continuous>  sodium chloride 0.9%. 1000 milliLiter(s) (100 mL/Hr) IV Continuous <Continuous>    MEDICATIONS  (PRN):  dextrose 40% Gel 15 Gram(s) Oral once PRN Blood Glucose LESS THAN 70 milliGRAM(s)/deciliter  glucagon  Injectable 1 milliGRAM(s) IntraMuscular once PRN Glucose LESS THAN 70 milligrams/deciliter      Allergies    No Known Allergies    Intolerances        Vital Signs Last 24 Hrs  T(C): 36.9 (20 Nov 2019 08:14), Max: 37.1 (19 Nov 2019 19:58)  T(F): 98.4 (20 Nov 2019 08:14), Max: 98.7 (19 Nov 2019 19:58)  HR: 86 (20 Nov 2019 08:14) (72 - 90)  BP: 134/95 (20 Nov 2019 08:14) (134/92 - 156/90)  BP(mean): --  RR: 22 (20 Nov 2019 08:14) (16 - 22)  SpO2: 94% (20 Nov 2019 08:14) (94% - 97%)    I&O's Summary    19 Nov 2019 07:01  -  20 Nov 2019 07:00  --------------------------------------------------------  IN: 120 mL / OUT: 0 mL / NET: 120 mL          PHYSICAL EXAM:  TELE: Sr 88, PAC, PVC  Constitutional: NAD, awake and alert, well-developed  HEENT: Moist Mucous Membranes, Anicteric  Pulmonary: Non-labored, breath sounds are clear bilaterally, No wheezing, crackles or rhonchi  Cardiovascular: Regular, S1 and S2 nl, No murmurs, rubs, gallops or clicks  Gastrointestinal: Bowel Sounds present, soft, nontender.   Lymph: No lymphadenopathy. No peripheral edema.  Skin: No visible rashes or ulcers.  Psych:  Mood & affect appropriate    LABS: All Labs Reviewed:                        17.4   12.23 )-----------( 212      ( 20 Nov 2019 06:33 )             49.5                         17.8   10.65 )-----------( 200      ( 19 Nov 2019 06:04 )             50.3                         17.4   11.01 )-----------( 195      ( 18 Nov 2019 05:12 )             49.1     20 Nov 2019 06:33    140    |  110    |  30     ----------------------------<  158    3.6     |  21     |  1.20   19 Nov 2019 06:04    137    |  106    |  34     ----------------------------<  202    3.9     |  25     |  1.40   18 Nov 2019 05:12    139    |  107    |  35     ----------------------------<  225    4.0     |  26     |  1.40     Ca    7.7        20 Nov 2019 06:33  Ca    8.6        19 Nov 2019 06:04  Ca    8.9        18 Nov 2019 05:12               ECG:  < from: 12 Lead ECG (11.14.19 @ 18:48) >    Ventricular Rate 94 BPM    Atrial Rate 94 BPM    P-R Interval 150 ms    QRS Duration 84 ms    Q-T Interval 348 ms    QTC Calculation(Bezet) 435 ms    P Axis 36 degrees    R Axis 44 degrees    T Axis -12 degrees    Diagnosis Line Normal sinus rhythm  Possible Inferior infarct , age undetermined  Abnormal ECG  No previous ECGs available    < end of copied text >        Echo:  < from: TTE Echo Doppler w/o Cont (11.19.19 @ 11:50) >    Conclusion:   Technically difficult study  Concentric LVH with normal internal dimensions and systolic function,   estimated LVEF of 65%.   Grossly normal RV size and systolic function.   Normal trileaflet aortic valve, without AI.   Trace physiologic MR and TR.    No significant pericardial effusion.                  Cardiology

## 2019-11-20 NOTE — DISCHARGE NOTE PROVIDER - CARE PROVIDERS DIRECT ADDRESSES
[email or web address]

## 2019-11-20 NOTE — PROGRESS NOTE ADULT - SUBJECTIVE AND OBJECTIVE BOX
Patient is a 54y old  Male who presents with a chief complaint of possible stroke (20 Nov 2019 11:54)      INTERVAL HPI/OVERNIGHT EVENTS: Pt seen and examined at bedside. Pt states he feels similar to yesterday with continuing right side facial decreased sensation, dizziness, and decreased right ear hearing. Pt is tolerating PO. Denies CP, palpitations, n/v, SOB, headache.     MEDICATIONS  (STANDING):  amoxicillin  875 milliGRAM(s)/clavulanate 1 Tablet(s) Oral two times a day  artificial tears (preservative free) Ophthalmic Solution 1 Drop(s) Left EYE three times a day  aspirin 325 milliGRAM(s) Oral daily  atorvastatin 80 milliGRAM(s) Oral at bedtime  carbamide peroxide Otic Solution 1 Drop(s) Both Ears two times a day  dextrose 5%. 1000 milliLiter(s) (50 mL/Hr) IV Continuous <Continuous>  dextrose 50% Injectable 12.5 Gram(s) IV Push once  dextrose 50% Injectable 25 Gram(s) IV Push once  dextrose 50% Injectable 25 Gram(s) IV Push once  enoxaparin Injectable 40 milliGRAM(s) SubCutaneous daily  insulin lispro (HumaLOG) corrective regimen sliding scale   SubCutaneous three times a day before meals  insulin lispro (HumaLOG) corrective regimen sliding scale   SubCutaneous at bedtime  insulin lispro Injectable (HumaLOG) 4 Unit(s) SubCutaneous three times a day with meals  meclizine 37.5 milliGRAM(s) Oral every 8 hours  sodium chloride 0.9%. 1000 milliLiter(s) (75 mL/Hr) IV Continuous <Continuous>  sodium chloride 0.9%. 1000 milliLiter(s) (100 mL/Hr) IV Continuous <Continuous>    MEDICATIONS  (PRN):  dextrose 40% Gel 15 Gram(s) Oral once PRN Blood Glucose LESS THAN 70 milliGRAM(s)/deciliter  glucagon  Injectable 1 milliGRAM(s) IntraMuscular once PRN Glucose LESS THAN 70 milligrams/deciliter      Allergies    No Known Allergies    Intolerances        REVIEW OF SYSTEMS:  CONSTITUTIONAL: No fever or chills, admits generalized weakness,  HEENT:  No headache, no sore throat  RESPIRATORY: No cough, wheezing, or shortness of breath  CARDIOVASCULAR: No chest pain, palpitations  GASTROINTESTINAL: No abd pain, nausea, vomiting, or diarrhea  GENITOURINARY: No dysuria, frequency, or hematuria  NEUROLOGICAL: admits unstable gait, decreased sensation of right face, denies focal weakness  MUSCULOSKELETAL: no myalgias     Vital Signs Last 24 Hrs  T(C): 36.8 (20 Nov 2019 12:07), Max: 37.1 (19 Nov 2019 19:58)  T(F): 98.3 (20 Nov 2019 12:07), Max: 98.7 (19 Nov 2019 19:58)  HR: 79 (20 Nov 2019 12:07) (72 - 86)  BP: 162/83 (20 Nov 2019 12:07) (134/92 - 162/83)  BP(mean): --  RR: 19 (20 Nov 2019 12:07) (16 - 22)  SpO2: 95% (20 Nov 2019 12:07) (94% - 97%)    PHYSICAL EXAM:  GENERAL: NAD  HEENT:  anicteric, moist mucous membranes  CHEST/LUNG:  CTA b/l, no rales, wheezes, or rhonchi  HEART:  RRR, S1, S2  ABDOMEN:  BS+, soft, nontender, nondistended  EXTREMITIES: no edema, cyanosis, or calf tenderness, muscle strength 5/5 UE and LE b/l  NERVOUS SYSTEM: answers questions and follows commands appropriately, +RUE ataxia, decreased right facial sensation when compared to left face, all other sensation intact    LABS:                        17.4   12.23 )-----------( 212      ( 20 Nov 2019 06:33 )             49.5     CBC Full  -  ( 20 Nov 2019 06:33 )  WBC Count : 12.23 K/uL  Hemoglobin : 17.4 g/dL  Hematocrit : 49.5 %  Platelet Count - Automated : 212 K/uL  Mean Cell Volume : 92.7 fl  Mean Cell Hemoglobin : 32.6 pg  Mean Cell Hemoglobin Concentration : 35.2 gm/dL  Auto Neutrophil # : 9.80 K/uL  Auto Lymphocyte # : 1.47 K/uL  Auto Monocyte # : 0.71 K/uL  Auto Eosinophil # : 0.11 K/uL  Auto Basophil # : 0.04 K/uL  Auto Neutrophil % : 80.2 %  Auto Lymphocyte % : 12.0 %  Auto Monocyte % : 5.8 %  Auto Eosinophil % : 0.9 %  Auto Basophil % : 0.3 %    20 Nov 2019 06:33    140    |  110    |  30     ----------------------------<  158    3.6     |  21     |  1.20     Ca    7.7        20 Nov 2019 06:33          CAPILLARY BLOOD GLUCOSE      POCT Blood Glucose.: 169 mg/dL (20 Nov 2019 13:20)  POCT Blood Glucose.: 184 mg/dL (20 Nov 2019 12:21)  POCT Blood Glucose.: 138 mg/dL (20 Nov 2019 09:15)  POCT Blood Glucose.: 154 mg/dL (20 Nov 2019 07:41)  POCT Blood Glucose.: 123 mg/dL (19 Nov 2019 21:31)  POCT Blood Glucose.: 134 mg/dL (19 Nov 2019 17:39)          RADIOLOGY & ADDITIONAL TESTS:    Personally reviewed.     Consultant(s) Notes Reviewed:  [x] YES  [ ] NO

## 2019-11-20 NOTE — DISCHARGE NOTE PROVIDER - PROVIDER TOKENS
PROVIDER:[TOKEN:[[id number]]] PROVIDER:[TOKEN:[[id number]]],PROVIDER:[[id number]]]

## 2019-11-20 NOTE — PROGRESS NOTE ADULT - PROBLEM SELECTOR PROBLEM 2
Vertebral artery disease

## 2019-11-21 ENCOUNTER — INPATIENT (INPATIENT)
Facility: HOSPITAL | Age: 54
LOS: 18 days | Discharge: NURSING, REHAB OR LONG-TERM CARE | DRG: 949 | End: 2019-12-10
Attending: PSYCHIATRY & NEUROLOGY | Admitting: PSYCHIATRY & NEUROLOGY
Payer: COMMERCIAL

## 2019-11-21 VITALS
RESPIRATION RATE: 18 BRPM | TEMPERATURE: 98 F | SYSTOLIC BLOOD PRESSURE: 158 MMHG | OXYGEN SATURATION: 97 % | DIASTOLIC BLOOD PRESSURE: 103 MMHG | HEART RATE: 64 BPM

## 2019-11-21 VITALS
HEART RATE: 73 BPM | RESPIRATION RATE: 14 BRPM | WEIGHT: 181.88 LBS | SYSTOLIC BLOOD PRESSURE: 161 MMHG | TEMPERATURE: 98 F | DIASTOLIC BLOOD PRESSURE: 100 MMHG | OXYGEN SATURATION: 96 % | HEIGHT: 66 IN

## 2019-11-21 DIAGNOSIS — I63.9 CEREBRAL INFARCTION, UNSPECIFIED: ICD-10-CM

## 2019-11-21 PROBLEM — I10 ESSENTIAL (PRIMARY) HYPERTENSION: Chronic | Status: ACTIVE | Noted: 2019-11-14

## 2019-11-21 LAB
ANION GAP SERPL CALC-SCNC: 6 MMOL/L — SIGNIFICANT CHANGE UP (ref 5–17)
BUN SERPL-MCNC: 29 MG/DL — HIGH (ref 7–23)
CALCIUM SERPL-MCNC: 8.1 MG/DL — LOW (ref 8.5–10.1)
CHLORIDE SERPL-SCNC: 112 MMOL/L — HIGH (ref 96–108)
CO2 SERPL-SCNC: 22 MMOL/L — SIGNIFICANT CHANGE UP (ref 22–31)
CREAT SERPL-MCNC: 1.3 MG/DL — SIGNIFICANT CHANGE UP (ref 0.5–1.3)
GLUCOSE BLDC GLUCOMTR-MCNC: 131 MG/DL — HIGH (ref 70–99)
GLUCOSE BLDC GLUCOMTR-MCNC: 171 MG/DL — HIGH (ref 70–99)
GLUCOSE SERPL-MCNC: 160 MG/DL — HIGH (ref 70–99)
HCT VFR BLD CALC: 49 % — SIGNIFICANT CHANGE UP (ref 39–50)
HGB BLD-MCNC: 17.4 G/DL — HIGH (ref 13–17)
MCHC RBC-ENTMCNC: 32.7 PG — SIGNIFICANT CHANGE UP (ref 27–34)
MCHC RBC-ENTMCNC: 35.5 GM/DL — SIGNIFICANT CHANGE UP (ref 32–36)
MCV RBC AUTO: 92.1 FL — SIGNIFICANT CHANGE UP (ref 80–100)
NRBC # BLD: 0 /100 WBCS — SIGNIFICANT CHANGE UP (ref 0–0)
PLATELET # BLD AUTO: 195 K/UL — SIGNIFICANT CHANGE UP (ref 150–400)
POTASSIUM SERPL-MCNC: 3.7 MMOL/L — SIGNIFICANT CHANGE UP (ref 3.5–5.3)
POTASSIUM SERPL-SCNC: 3.7 MMOL/L — SIGNIFICANT CHANGE UP (ref 3.5–5.3)
RBC # BLD: 5.32 M/UL — SIGNIFICANT CHANGE UP (ref 4.2–5.8)
RBC # FLD: 11.8 % — SIGNIFICANT CHANGE UP (ref 10.3–14.5)
SODIUM SERPL-SCNC: 140 MMOL/L — SIGNIFICANT CHANGE UP (ref 135–145)
WBC # BLD: 10.66 K/UL — HIGH (ref 3.8–10.5)
WBC # FLD AUTO: 10.66 K/UL — HIGH (ref 3.8–10.5)

## 2019-11-21 PROCEDURE — 97112 NEUROMUSCULAR REEDUCATION: CPT

## 2019-11-21 PROCEDURE — 70553 MRI BRAIN STEM W/O & W/DYE: CPT

## 2019-11-21 PROCEDURE — 97116 GAIT TRAINING THERAPY: CPT

## 2019-11-21 PROCEDURE — 70450 CT HEAD/BRAIN W/O DYE: CPT

## 2019-11-21 PROCEDURE — 97162 PT EVAL MOD COMPLEX 30 MIN: CPT

## 2019-11-21 PROCEDURE — 93005 ELECTROCARDIOGRAM TRACING: CPT

## 2019-11-21 PROCEDURE — 82962 GLUCOSE BLOOD TEST: CPT

## 2019-11-21 PROCEDURE — 84436 ASSAY OF TOTAL THYROXINE: CPT

## 2019-11-21 PROCEDURE — 70551 MRI BRAIN STEM W/O DYE: CPT

## 2019-11-21 PROCEDURE — 99239 HOSP IP/OBS DSCHRG MGMT >30: CPT

## 2019-11-21 PROCEDURE — 97530 THERAPEUTIC ACTIVITIES: CPT

## 2019-11-21 PROCEDURE — A9579: CPT

## 2019-11-21 PROCEDURE — 85027 COMPLETE CBC AUTOMATED: CPT

## 2019-11-21 PROCEDURE — 86803 HEPATITIS C AB TEST: CPT

## 2019-11-21 PROCEDURE — 80048 BASIC METABOLIC PNL TOTAL CA: CPT

## 2019-11-21 PROCEDURE — 85610 PROTHROMBIN TIME: CPT

## 2019-11-21 PROCEDURE — 80053 COMPREHEN METABOLIC PANEL: CPT

## 2019-11-21 PROCEDURE — 85730 THROMBOPLASTIN TIME PARTIAL: CPT

## 2019-11-21 PROCEDURE — 70496 CT ANGIOGRAPHY HEAD: CPT

## 2019-11-21 PROCEDURE — 70498 CT ANGIOGRAPHY NECK: CPT

## 2019-11-21 PROCEDURE — 93306 TTE W/DOPPLER COMPLETE: CPT

## 2019-11-21 PROCEDURE — 84480 ASSAY TRIIODOTHYRONINE (T3): CPT

## 2019-11-21 PROCEDURE — 92610 EVALUATE SWALLOWING FUNCTION: CPT

## 2019-11-21 PROCEDURE — 70549 MR ANGIOGRAPH NECK W/O&W/DYE: CPT

## 2019-11-21 PROCEDURE — 80061 LIPID PANEL: CPT

## 2019-11-21 PROCEDURE — 99232 SBSQ HOSP IP/OBS MODERATE 35: CPT

## 2019-11-21 PROCEDURE — 99223 1ST HOSP IP/OBS HIGH 75: CPT

## 2019-11-21 PROCEDURE — 36415 COLL VENOUS BLD VENIPUNCTURE: CPT

## 2019-11-21 PROCEDURE — 83690 ASSAY OF LIPASE: CPT

## 2019-11-21 PROCEDURE — 84443 ASSAY THYROID STIM HORMONE: CPT

## 2019-11-21 PROCEDURE — 70544 MR ANGIOGRAPHY HEAD W/O DYE: CPT

## 2019-11-21 PROCEDURE — 99285 EMERGENCY DEPT VISIT HI MDM: CPT | Mod: 25

## 2019-11-21 PROCEDURE — 96374 THER/PROPH/DIAG INJ IV PUSH: CPT | Mod: XU

## 2019-11-21 PROCEDURE — 83036 HEMOGLOBIN GLYCOSYLATED A1C: CPT

## 2019-11-21 PROCEDURE — 97166 OT EVAL MOD COMPLEX 45 MIN: CPT

## 2019-11-21 RX ORDER — INSULIN LISPRO 100/ML
VIAL (ML) SUBCUTANEOUS AT BEDTIME
Refills: 0 | Status: DISCONTINUED | OUTPATIENT
Start: 2019-11-21 | End: 2019-12-03

## 2019-11-21 RX ORDER — INSULIN LISPRO 100/ML
0 VIAL (ML) SUBCUTANEOUS
Qty: 0 | Refills: 0 | DISCHARGE
Start: 2019-11-21

## 2019-11-21 RX ORDER — DEXTROSE 50 % IN WATER 50 %
25 SYRINGE (ML) INTRAVENOUS ONCE
Refills: 0 | Status: DISCONTINUED | OUTPATIENT
Start: 2019-11-21 | End: 2019-12-10

## 2019-11-21 RX ORDER — GLUCAGON INJECTION, SOLUTION 0.5 MG/.1ML
1 INJECTION, SOLUTION SUBCUTANEOUS ONCE
Refills: 0 | Status: DISCONTINUED | OUTPATIENT
Start: 2019-11-21 | End: 2019-12-10

## 2019-11-21 RX ORDER — ASPIRIN/CALCIUM CARB/MAGNESIUM 324 MG
325 TABLET ORAL DAILY
Refills: 0 | Status: DISCONTINUED | OUTPATIENT
Start: 2019-11-21 | End: 2019-11-21

## 2019-11-21 RX ORDER — DEXTROSE 50 % IN WATER 50 %
15 SYRINGE (ML) INTRAVENOUS ONCE
Refills: 0 | Status: DISCONTINUED | OUTPATIENT
Start: 2019-11-21 | End: 2019-12-10

## 2019-11-21 RX ORDER — PANTOPRAZOLE SODIUM 20 MG/1
40 TABLET, DELAYED RELEASE ORAL
Refills: 0 | Status: DISCONTINUED | OUTPATIENT
Start: 2019-11-21 | End: 2019-12-10

## 2019-11-21 RX ORDER — INSULIN LISPRO 100/ML
VIAL (ML) SUBCUTANEOUS
Refills: 0 | Status: DISCONTINUED | OUTPATIENT
Start: 2019-11-21 | End: 2019-12-03

## 2019-11-21 RX ORDER — METOPROLOL TARTRATE 50 MG
0 TABLET ORAL
Qty: 0 | Refills: 0 | DISCHARGE

## 2019-11-21 RX ORDER — ENOXAPARIN SODIUM 100 MG/ML
40 INJECTION SUBCUTANEOUS
Qty: 0 | Refills: 0 | DISCHARGE
Start: 2019-11-21

## 2019-11-21 RX ORDER — ASPIRIN/CALCIUM CARB/MAGNESIUM 324 MG
325 TABLET ORAL DAILY
Refills: 0 | Status: DISCONTINUED | OUTPATIENT
Start: 2019-11-22 | End: 2019-11-22

## 2019-11-21 RX ORDER — INSULIN LISPRO 100/ML
4 VIAL (ML) SUBCUTANEOUS
Qty: 0 | Refills: 0 | DISCHARGE
Start: 2019-11-21

## 2019-11-21 RX ORDER — MECLIZINE HCL 12.5 MG
12.5 TABLET ORAL EVERY 8 HOURS
Refills: 0 | Status: DISCONTINUED | OUTPATIENT
Start: 2019-11-21 | End: 2019-11-21

## 2019-11-21 RX ORDER — AMLODIPINE BESYLATE 2.5 MG/1
1 TABLET ORAL
Qty: 0 | Refills: 0 | DISCHARGE

## 2019-11-21 RX ORDER — MECLIZINE HCL 12.5 MG
37.5 TABLET ORAL EVERY 8 HOURS
Refills: 0 | Status: DISCONTINUED | OUTPATIENT
Start: 2019-11-21 | End: 2019-12-04

## 2019-11-21 RX ORDER — DEXTROSE 50 % IN WATER 50 %
12.5 SYRINGE (ML) INTRAVENOUS ONCE
Refills: 0 | Status: DISCONTINUED | OUTPATIENT
Start: 2019-11-21 | End: 2019-12-10

## 2019-11-21 RX ORDER — SODIUM CHLORIDE 9 MG/ML
1000 INJECTION, SOLUTION INTRAVENOUS
Refills: 0 | Status: DISCONTINUED | OUTPATIENT
Start: 2019-11-21 | End: 2019-12-10

## 2019-11-21 RX ORDER — ATORVASTATIN CALCIUM 80 MG/1
80 TABLET, FILM COATED ORAL AT BEDTIME
Refills: 0 | Status: DISCONTINUED | OUTPATIENT
Start: 2019-11-21 | End: 2019-12-10

## 2019-11-21 RX ORDER — ENOXAPARIN SODIUM 100 MG/ML
40 INJECTION SUBCUTANEOUS DAILY
Refills: 0 | Status: DISCONTINUED | OUTPATIENT
Start: 2019-11-22 | End: 2019-12-10

## 2019-11-21 RX ORDER — INSULIN LISPRO 100/ML
3 VIAL (ML) SUBCUTANEOUS
Refills: 0 | Status: DISCONTINUED | OUTPATIENT
Start: 2019-11-21 | End: 2019-11-22

## 2019-11-21 RX ADMIN — ENOXAPARIN SODIUM 40 MILLIGRAM(S): 100 INJECTION SUBCUTANEOUS at 12:18

## 2019-11-21 RX ADMIN — Medication 4 UNIT(S): at 08:21

## 2019-11-21 RX ADMIN — Medication 1 DROP(S): at 05:16

## 2019-11-21 RX ADMIN — Medication 37.5 MILLIGRAM(S): at 21:14

## 2019-11-21 RX ADMIN — ATORVASTATIN CALCIUM 80 MILLIGRAM(S): 80 TABLET, FILM COATED ORAL at 21:14

## 2019-11-21 RX ADMIN — Medication 325 MILLIGRAM(S): at 12:18

## 2019-11-21 RX ADMIN — Medication 1 DROP(S): at 13:40

## 2019-11-21 RX ADMIN — Medication 37.5 MILLIGRAM(S): at 05:16

## 2019-11-21 RX ADMIN — Medication 3 UNIT(S): at 17:48

## 2019-11-21 RX ADMIN — CARBAMIDE PEROXIDE 1 DROP(S): 81.86 SOLUTION/ DROPS AURICULAR (OTIC) at 05:16

## 2019-11-21 RX ADMIN — Medication 1 TABLET(S): at 05:16

## 2019-11-21 RX ADMIN — Medication 37.5 MILLIGRAM(S): at 13:40

## 2019-11-21 RX ADMIN — Medication 4 UNIT(S): at 12:23

## 2019-11-21 NOTE — PATIENT PROFILE ADULT - NSPROEDALEARNPREF_GEN_A_NUR
audio/verbal instruction/computer/internet/group instruction/written material/pictorial/skill demonstration/video/individual instruction

## 2019-11-21 NOTE — H&P ADULT - HISTORY OF PRESENT ILLNESS
55 yo male with PMHx of HTN presents to [facility] ED with complaint of dizziness/vertigo and unsteady gait. As per pt report intermittent dizziness lasted for 2 days and progressed to constant dizziness/unable to stand without holding unto objects. Also reported two episodes of vomiting days prior, prompting urgent care evaluation. Patient was given Meclizine at the urgent care and was advised ED eval if symptoms persisted. CT head on admission shows no acute hemorrhage or mass effect. 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 Neurology evaluation requested. EKG NSR. ASA 325mg started.   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 TTE WNL. Symptoms continued to worsen in spite of permissive HTN and repeat CT was performed ruling out hemorrhagic conversion. HbA1c 9.5%. 55 yo male with PMHx of HTN presents to [facility] ED with complaint of dizziness/vertigo and unsteady gait. As per pt report intermittent dizziness lasted for 2 days and progressed to constant dizziness/unable to stand without holding unto objects. Also reported two episodes of vomiting days prior, prompting urgent care evaluation. Patient was given Meclizine at the urgent care and was advised ED eval if symptoms persisted. CT head on admission shows no acute hemorrhage or mass effect. 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 Neurology evaluation requested. EKG NSR. ASA 325mg started.   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 TTE WNL. Symptoms continued to worsen in spite of permissive HTN and repeat CT was performed ruling out hemorrhagic conversion. HbA1c 9.5%. .   *Augmentin x7 days with Debrox gtts for Otitis Media.

## 2019-11-21 NOTE — H&P ADULT - NSHPPHYSICALEXAM_GEN_ALL_CORE
Constitutional - NAD, Comfortable  HEENT - NCAT, EOMI  Neck - Supple, No limited ROM  Chest - good chest expansion, good respiratory effort, CTAB   Cardio - warm and well perfused, RRR, no murmur  Abdomen -  Soft, NTND  Extremities - No peripheral edema, No calf tenderness   Neurologic Exam:                    Cognitive - mild cognitive deficits             Orientation: Awake, Alert, AAO to self, place, date, year, situation            Attention:  Days of week, recall 3 objects with cuing            Memory: Recent / remote memory slightly impaired            Thought: slow processing, content appropriate     Speech - Fluent, Comprehensible, slight dysarthria ?coordination, No aphasia      Cranial Nerves - no facial asymmetry, decreased sensation to R side of face      Motor -  Good effort,  no gross hemiparesis                    LEFT    UE - ShAB 5/5, EF 5/5, EE 5/5, WE 5/5,  WNL                    RIGHT UE - ShAB 5/5, EF 5/5, EE 5/5, WE 5/5,  WNL                    LEFT    LE - HF 5/5, KE 5/5, DF 5/5, PF 5/5                    RIGHT LE - HF 5/5, KE 5/5, DF 5/5, PF 5/5        Sensory - decreased R face     Reflexes - DTR 1/ 4 , neg Noe's b/l, neg Babinski's b/l     Coordination - dysmetria R FTN / HTS intact b/l     OculoVestibular - No saccades, + nystagmus L and R. EOMI, no visual deficits     Psychiatric - Mood stable, Affect flat  Skin: hyperpigmentation to R superior butt cheek, R groin friction irritation

## 2019-11-21 NOTE — PROGRESS NOTE ADULT - SUBJECTIVE AND OBJECTIVE BOX
Neurology Follow up note    ERASTO [id number]    HPI:  54M with PMH of HTN presents with dizziness for 4 days. States he feels like the room is spinning and has an unsteady gait. Reports intermittent dizziness for a 2 days but progressed to constant dizziness, he was unable to stand without holding unto objects near by. Patient walks to the [facility] for work daily and couldn't walk without reaching and leaning on trees. States similar brief episode of dizziness last week but was able to go to work and symptoms resolved after a minute. Patient works as a  in [employer], skins and cuts fish, also does heavy loading. States diet consists of "eat on the go take out," including 7/11, bagels, soda, ice tea, deli food and chinese food. Admits to two episodes of non-bloody, non-bilious emesis and was unable to keep food down today, prompting urgent care evaluation. Patient was given Meclizine at the urgent care and was advised ED eval if symptoms persisted. Denies chest pain, palpitations, SOB, FRENCH, abd pain, c/d. Denies ear pain, sore throat. States his form of exercise is walking, as patient does not have a car.     In the ED: Temp 98.1, HR 88, /102, RR 16, SpO2 100% RA.   H/H: 18.4/51.6, glucose 267.   CT head: No acute hemorrhage or mass effect.  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  NIH Stroke Scale: 0, EKG: NSR 94  Received ASA 325mg, Augmentin x1, 1L NS bolus, Valium 5mg x1, Meclizine 25mg x1, Solu-Medrol 125mg IV x1. (14 Nov 2019 22:53)      Interval History - no new complaints; speaking better    Patient is seen, chart was reviewed and case was discussed with the treatment team.  Pt is not in any distress.   Lying on bed comfortably.   No events reported overnight.   No clinical seizure was reported.  Sitting on chair bed comfortably.    is at bedside.    Vital Signs Last 24 Hrs  T(C): 36.8 (21 Nov 2019 08:35), Max: 37 (20 Nov 2019 20:16)  T(F): 98.3 (21 Nov 2019 08:35), Max: 98.6 (20 Nov 2019 20:16)  HR: 71 (21 Nov 2019 08:35) (58 - 85)  BP: 150/104 (21 Nov 2019 08:35) (134/99 - 168/115)  BP(mean): --  RR: 18 (21 Nov 2019 08:35) (18 - 19)  SpO2: 93% (21 Nov 2019 08:35) (93% - 98%)        REVIEW OF SYSTEMS:    Constitutional: No fever, weight loss or fatigue  Eyes: No eye pain, visual disturbances, or discharge  ENT:  No sinus or throat pain  Neck: No pain or stiffness  Respiratory: No cough, wheezing, chills or hemoptysis  Cardiovascular: No chest pain, palpitations,   Gastrointestinal: No abdominal or epigastric pain.   Genitourinary: No dysuria, frequency, hematuria or incontinence  Neurological: No headaches, memory loss,   Psychiatric: No depression,   Musculoskeletal: No joint pain or swelling;   Skin: No itching, burning, rashes or lesions   Lymph Nodes: No enlarged glands  Endocrine: No heat or cold intolerance;      On Neurological Examination:    Mental Status - Pt is alert, awake, oriented X3.   Follows commands well and able to answer questions appropriately.  Mood and affect  normal    Speech - dysarthria.    Cranial Nerves - Pupils 3 mm equal and reactive to light, upbeating nystagmus ,more pronounced in left lateral gaze ?ophthalmoplegia,  Pt has right  facial asymmetry. Facial sensation is diminished to LT/PP on right.  Tongue - is in midline.    Muscle tone - is normal all over. Moves all extremities equally. No asymmetry is seen.      Motor Exam - 5/5 all over, No drift.     Sensory Exam - Pin prick, temperature, joint position and vibration are intact on either side. Pt withdraws all extremities equally on stimulation. No asymmetry seen.     Gait - severe right hemiataxia; falling to right despite  using walker,      coordination:    Finger to nose: dysmetria/past pointing on right.    Heel to shin:  right sided dysmetria.    Deep tendon Reflexes - 2 plus all over.    Neck Supple -  Yes.     MEDICATIONS    amoxicillin  875 milliGRAM(s)/clavulanate 1 Tablet(s) Oral two times a day  artificial tears (preservative free) Ophthalmic Solution 1 Drop(s) Left EYE three times a day  aspirin 325 milliGRAM(s) Oral daily  atorvastatin 80 milliGRAM(s) Oral at bedtime  carbamide peroxide Otic Solution 1 Drop(s) Both Ears two times a day  dextrose 40% Gel 15 Gram(s) Oral once PRN  dextrose 5%. 1000 milliLiter(s) IV Continuous <Continuous>  dextrose 50% Injectable 12.5 Gram(s) IV Push once  dextrose 50% Injectable 25 Gram(s) IV Push once  dextrose 50% Injectable 25 Gram(s) IV Push once  enoxaparin Injectable 40 milliGRAM(s) SubCutaneous daily  glucagon  Injectable 1 milliGRAM(s) IntraMuscular once PRN  insulin lispro (HumaLOG) corrective regimen sliding scale   SubCutaneous three times a day before meals  insulin lispro (HumaLOG) corrective regimen sliding scale   SubCutaneous at bedtime  insulin lispro Injectable (HumaLOG) 4 Unit(s) SubCutaneous three times a day with meals  meclizine 37.5 milliGRAM(s) Oral every 8 hours  sodium chloride 0.9%. 1000 milliLiter(s) IV Continuous <Continuous>  sodium chloride 0.9%. 1000 milliLiter(s) IV Continuous <Continuous>      Allergies    No Known Allergies    Intolerances                              17.4   10.66 )-----------( 195      ( 21 Nov 2019 06:46 )             49.0         11-21    140  |  112<H>  |  29<H>  ----------------------------<  160<H>  3.7   |  22  |  1.30    Ca    8.1<L>      21 Nov 2019 06:46          Hemoglobin A1C:     Vitamin B12     RADIOLOGY  < from: MR Head No Cont (11.18.19 @ 13:07) >    EXAM:  MR BRAIN                            PROCEDURE DATE:  11/18/2019          INTERPRETATION:  CLINICAL INDICATION: Dizziness, unsteady gait. Evaluate   for CVA.    TECHNIQUE: Multi-planar multi-sequential MR imaging of the brain was   performed without intravenous contrast.    COMPARISON: Brain MRI, MR angiography head and neck 11/15/2019.    FINDINGS:    There is hyperintensity on diffusion-weighted sequence with associated   signal dropout on corresponding ADC maps in the right brachium pontis   (series 4, images 8-10), findings compatible with evolving acute infarct.   There is no associated hemorrhage.    There are scattered T2/FLAIR hyperintense changes in the periventricular   and subcortical white matter which are nonspecific finding, but most   likely represent sequelae of mild to moderate chronic microvascular   ischemic disease. There is mild diffuse cortical sulcal prominence   related to underlying brain parenchymal volume loss.    There is no intracranial mass. The ventricles are normal without evidence   of hydrocephalus. There are no extra-axial fluid collections. The skull   base flow voids are present.  There is an enlarged, partially empty sella turcica.      The visualized intraorbital contents are normal. There are mild mucosal   inflammatory changes of the ethmoid air cells. The mastoid air cells are   clear. The visualized soft tissues and osseous structures appear   unremarkable.    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 Noassociated   hemorrhage.    These findings were discussed with Dr. Pang of the patient's clinical   team on 11/18/2019 at approximately 2:00 PM.        ARPAN SALDANA M.D., ATTENDING RADIOLOGIST  This document has been electronically signed. Nov 18 2019  2:11PM             ASSESSMENT AND PLAN:       presented with vertigo ,ataxia, decreased hearing related to subacute right brachium pontis infarct  worsening of right sided ataxia ;   neuro examination stable over last 24 hr, repeat ct head ; no bleed.  right V4 occlusion.  HTN.  NEW DIAGNOSED DM 2.    Continue asa/statin  No antihypertensive.  stable for rehab  dw house staff and dr Abel.  Physical therapy evaluation.  OOB to chair/ambulation with assistance only.  Pain is accessed and addressed.  Would continue to follow.

## 2019-11-21 NOTE — H&P ADULT - NSHPREVIEWOFSYSTEMS_GEN_ALL_CORE
REVIEW OF SYSTEMS:  CONSTITUTIONAL: No fever, weight loss, or fatigue  EYES: No eye pain, visual disturbances, or discharge  ENMT:  No difficulty hearing, tinnitus, vertigo; No sinus or throat pain  NECK: No pain or stiffness  BREASTS: No pain, masses, or nipple discharge  RESPIRATORY: No cough, wheezing, chills or hemoptysis; No shortness of breath  CARDIOVASCULAR: No chest pain, palpitations, dizziness, or leg swelling  GASTROINTESTINAL: No abdominal or epigastric pain. No nausea, vomiting, or hematemesis; No diarrhea or constipation. No melena or hematochezia.  GENITOURINARY: No dysuria, frequency, hematuria, or incontinence  NEUROLOGICAL: No headaches, memory loss, loss of strength, numbness, or tremors  SKIN: No itching, burning, rashes, or lesions   LYMPH NODES: No enlarged glands  ENDOCRINE: No heat or cold intolerance; No hair loss  MUSCULOSKELETAL: No joint pain or swelling; No muscle, back, or extremity pain  PSYCHIATRIC: No depression, anxiety, mood swings, or difficulty sleeping  HEME/LYMPH: No easy bruising, or bleeding gums  ALLERY AND IMMUNOLOGIC: No hives or eczema CONSTITUTIONAL: No fever, weight loss, or fatigue  EYES: No eye pain, visual disturbances, or discharge  ENMT:  No difficulty hearing, tinnitus, vertigo; No sinus or throat pain  NECK: No pain or stiffness  BREASTS: No pain, masses, or nipple discharge  RESPIRATORY: No cough, wheezing, chills or hemoptysis; No shortness of breath  CARDIOVASCULAR: No chest pain, palpitations, dizziness, or leg swelling  GASTROINTESTINAL: No abdominal or epigastric pain. No nausea, vomiting, or hematemesis; No diarrhea or constipation. No melena or hematochezia.  GENITOURINARY: No dysuria, frequency, hematuria, or incontinence  NEUROLOGICAL: No headaches, memory loss, loss of strength, numbness, or tremors  SKIN: No itching, burning, rashes, or lesions   LYMPH NODES: No enlarged glands  ENDOCRINE: No heat or cold intolerance; No hair loss  MUSCULOSKELETAL: No joint pain or swelling; No muscle, back, or extremity pain  PSYCHIATRIC: No depression, anxiety, mood swings, or difficulty sleeping  HEME/LYMPH: No easy bruising, or bleeding gums  ALLERY AND IMMUNOLOGIC: No hives or eczema

## 2019-11-21 NOTE — H&P ADULT - ASSESSMENT
53 yo male s/p right sided CVA with unsteady gait and functional deficits admitted to rehab [facility]      #CVA  -start PT/OT/SLP program, fall precautions. Lovenox for DVT ppx      #Dizziness  -meclizine TID. Fall precautions      #DM2  -Humalog, FS ACHS, diabetic education.         Precautions:    fall                                                                              Diet: dash/reg    DVT Prophylaxis:     lovenox                                                                     Medical Prognosis: fair    Prescreen Comparison: I have reviewed the prescreen information and I found no relevant changes between the preadmission  screening and my post admission evaluation.     Expected Therapy:   P.T.    1    hrs/day           O. T.   1   hrs/day           S.L.P.   1     hrs/day                    P&O   eval                                                Excpected Frequency: 5 days/7 day period    Rehab Potential:       good                                    Estimated Disposition:      home                    ELOS:     14         days      Rationale For Inpatient Rehab Admission- Patient demonstrates the following:     [X] Medically appropriate for rehabilitation admission   [X] Has attainable rehab goals with an approrpriate discharge plan  [X] Has rehabilitation potential (expected to make significant improvement within a reasonable period of time)  [X] Requires close medical management by a rehab physician, rehab nursing care and comprehensive interdisciplinary team (including         PT, OT, SLP and/or prosthetics and orthotics) 55 yo male s/p right sided cerebellar CVA with unsteady gait and functional deficits admitted to rehab [facility]      #CVA  -start PT/OT/SLP program, fall precautions. Lovenox for DVT ppx  -CTA head and neck: Diffusely hypoplastic right vertebral artery with calcifications,  - FD ASA, high dose ASA  - cerebellar disease with dysmetria R -- meclizine as needed    #Dizziness w/ vertigo  -meclizine TID. Fall precautions    #DM2  -Humalog, FS ACHS, diabetic education. New diagnosis.     #[employer]:  - R ear fullness/ R tympanic buldge   - Augmentin     Precautions:    fall                                                                              Diet: dash/reg    DVT Prophylaxis:     lovenox                                                                     Medical Prognosis: fair    Prescreen Comparison: I have reviewed the prescreen information and I found no relevant changes between the preadmission  screening and my post admission evaluation.     Expected Therapy:   P.T.    1    hrs/day           O. T.   1   hrs/day           S.L.P.   1     hrs/day                    P&O   eval                                                Excpected Frequency: 5 days/7 day period    Rehab Potential:       good                                    Estimated Disposition:      home                    ELOS:     14         days      Rationale For Inpatient Rehab Admission- Patient demonstrates the following:     [X] Medically appropriate for rehabilitation admission   [X] Has attainable rehab goals with an approrpriate discharge plan  [X] Has rehabilitation potential (expected to make significant improvement within a reasonable period of time)  [X] Requires close medical management by a rehab physician, rehab nursing care and comprehensive interdisciplinary team (including         PT, OT, SLP and/or prosthetics and orthotics)

## 2019-11-21 NOTE — PROGRESS NOTE ADULT - ATTENDING COMMENTS
Chart reviewed    Patient seen and examined    Agree with plan as outlined above
Seen/examined. agree with above.
pt seen and examine see above plan -   admitted for  dizziness     with  peripheral    vertigo - work up showed   cta of neck reported V4 OCCLUSION OF RIGHT VA.  1st mri was not showing cva but repeat with worsening ataxia and dizziness showed    repeat mri , mra   brain -  today found to have rt cerebellar cva  - hold bp meds for permissive htn , statin , asa , neurocheck , nih 2  .  MRA NECK: No vessel narrowing or occlusion in the neck. Dominant left   vertebral artery , vascular surgery fu up out pt.   new dm type 2 uncontrolled - on riss  ,  dose  increased today  4 unit tid Humalog   [will  change on  Novolog  and metformin at dc  ] ,  bs Accu-Cheks . pt evaluation need yared .
pt seen and examine see above plan -   admitted for  dizziness    likely peripheral    vertigo  still persistent - increase dose of Antivert  37.5 mg po tid .   cta of neck reported V4 OCCLUSION OF RIGHT VA.   - mri brain neg ruled out cva this time  and tia  this time .   MRA NECK: No vessel narrowing or occlusion in the neck. Dominant left   vertebral artery , vascular surgery fu up out pt.   new dm type 2 uncontrolled - on riss  ,  dose  increased today  4 unit tid Humalog   [will  change on  Novolog  and metformin at dc  ] ,  bs Accu-Cheks .
pt seen and examine see above plan -   admitted for  dizziness  presented with for 4 day;  likely peripheral    vertigo    cta of neck reported V4 OCCLUSION OF RIGHT VA.   - mri brain neg ruled out cva this time  and tia  this time .   MRA NECK: No vessel narrowing or occlusion in the neck. Dominant left   vertebral artery , vascular surgery fu up out pt.   new dm type 2 uncontrolled - on riss  , Humalog   will  change on  Novolog  and metformin at dc   ,  bs Accu-Cheks .
D/w Neuro. CT head results noted. No new findings.  D/c planning to acute rehab in progress
pt seen and examine see above plan -   admitted for  dizziness     with  peripheral    vertigo - work up showed   cta of neck reported V4 OCCLUSION OF RIGHT VA.  1st mri was not showing cva but repeat with worsening ataxia and dizziness showed    repeat mri , mra   brain -   acute  rt cerebellar cva  -   hold bp meds for permissive htn  ns 100/hr , statin , asa , neurocheck 4 hr  , nih 2  .  MRA NECK: No vessel narrowing or occlusion in the neck. Dominant left   vertebral artery .   new dm type 2 uncontrolled - on riss  ,  dose  increased today  4 unit tid Humalog   [will  change on  Novolog  and metformin at dc  ] ,  bs Accu-Cheks .    called neuro surg dr harrell [phone] but pt is not candidate , icu also called for further evaluation if need more frequent neuro check . pt evaluation need yared . cardio dr espinoza on case -echo pending result .
pt seen and examine see above plan - admitted for  dizziness  presented with for 4 day;  likely peripheral    vertigo   ;   cta of neck reported V4 OCCLUSION OF RIGHT VA.   - mri brain neg ruled out cva this time  and tia  this time .   MRA NECK: No vessel narrowing or occlusion in the neck. Dominant left   vertebral artery.  new dm type 2 uncontrolled - on riss  , Humalog , Accu-Cheks .

## 2019-11-21 NOTE — H&P ADULT - ATTENDING COMMENTS
53 yo RH male with multiple poorly controlled vascular risk factors , including HTN, DM and HLD, s/p right sided cerebellar CVA with unsteady gait and functional deficits.    Patient examined, chart reviewed  All medications reviewed  Resume all medications  Admit labs  Goal HDL <70  Goal Hba1c < 7  Medicine evaluation  Fall and aspiration precautions

## 2019-11-21 NOTE — PROGRESS NOTE ADULT - SUBJECTIVE AND OBJECTIVE BOX
[facility] -- Tom Morales, Micheline, Regan, Raffi Ordoñez Savella  Office # [phone]      Follow Up:  htn CVA      Subjective/Observations:   Seen at bedside, + weakness, no chest pain dizziness palpitations  No fever or chills     REVIEW OF SYSTEMS: All other review of systems is negative unless indicated above    PAST MEDICAL & SURGICAL HISTORY:  HTN (hypertension)  No significant past surgical history      MEDICATIONS  (STANDING):  amoxicillin  875 milliGRAM(s)/clavulanate 1 Tablet(s) Oral two times a day  artificial tears (preservative free) Ophthalmic Solution 1 Drop(s) Left EYE three times a day  aspirin 325 milliGRAM(s) Oral daily  atorvastatin 80 milliGRAM(s) Oral at bedtime  carbamide peroxide Otic Solution 1 Drop(s) Both Ears two times a day  dextrose 5%. 1000 milliLiter(s) (50 mL/Hr) IV Continuous <Continuous>  dextrose 50% Injectable 12.5 Gram(s) IV Push once  dextrose 50% Injectable 25 Gram(s) IV Push once  dextrose 50% Injectable 25 Gram(s) IV Push once  enoxaparin Injectable 40 milliGRAM(s) SubCutaneous daily  insulin lispro (HumaLOG) corrective regimen sliding scale   SubCutaneous three times a day before meals  insulin lispro (HumaLOG) corrective regimen sliding scale   SubCutaneous at bedtime  insulin lispro Injectable (HumaLOG) 4 Unit(s) SubCutaneous three times a day with meals  meclizine 37.5 milliGRAM(s) Oral every 8 hours  sodium chloride 0.9%. 1000 milliLiter(s) (75 mL/Hr) IV Continuous <Continuous>  sodium chloride 0.9%. 1000 milliLiter(s) (100 mL/Hr) IV Continuous <Continuous>    MEDICATIONS  (PRN):  dextrose 40% Gel 15 Gram(s) Oral once PRN Blood Glucose LESS THAN 70 milliGRAM(s)/deciliter  glucagon  Injectable 1 milliGRAM(s) IntraMuscular once PRN Glucose LESS THAN 70 milligrams/deciliter      Allergies    No Known Allergies    Intolerances        Vital Signs Last 24 Hrs  T(C): 36.8 (21 Nov 2019 08:35), Max: 37 (20 Nov 2019 20:16)  T(F): 98.3 (21 Nov 2019 08:35), Max: 98.6 (20 Nov 2019 20:16)  HR: 71 (21 Nov 2019 08:35) (58 - 85)  BP: 150/104 (21 Nov 2019 08:35) (134/99 - 168/115)  BP(mean): --  RR: 18 (21 Nov 2019 08:35) (18 - 19)  SpO2: 93% (21 Nov 2019 08:35) (93% - 98%)    I&O's Summary        PHYSICAL EXAM:  TELE: SR no PVC  Constitutional: NAD, awake and alert, well-developed  HEENT: Moist Mucous Membranes, Anicteric  Pulmonary: Non-labored, breath sounds are clear bilaterally, No wheezing, crackles or rhonchi  Cardiovascular: Regular, S1 and S2 nl, No murmurs, rubs, gallops or clicks  Gastrointestinal: Bowel Sounds present, soft, nontender.   Lymph: No lymphadenopathy. No peripheral edema.  Skin: No visible rashes or ulcers.  Psych:  Mood & affect appropriate    LABS: All Labs Reviewed:                        17.4   10.66 )-----------( 195      ( 21 Nov 2019 06:46 )             49.0                         17.4   12.23 )-----------( 212      ( 20 Nov 2019 06:33 )             49.5                         17.8   10.65 )-----------( 200      ( 19 Nov 2019 06:04 )             50.3     21 Nov 2019 06:46    140    |  112    |  29     ----------------------------<  160    3.7     |  22     |  1.30   20 Nov 2019 06:33    140    |  110    |  30     ----------------------------<  158    3.6     |  21     |  1.20   19 Nov 2019 06:04    137    |  106    |  34     ----------------------------<  202    3.9     |  25     |  1.40     Ca    8.1        21 Nov 2019 06:46  Ca    7.7        20 Nov 2019 06:33  Ca    8.6        19 Nov 2019 06:04               ECG:  < from: 12 Lead ECG (11.14.19 @ 18:48) >    Ventricular Rate 94 BPM    Atrial Rate 94 BPM    P-R Interval 150 ms    QRS Duration 84 ms    Q-T Interval 348 ms    QTC Calculation(Bezet) 435 ms    P Axis 36 degrees    R Axis 44 degrees    T Axis -12 degrees    Diagnosis Line Normal sinus rhythm  Possible Inferior infarct , age undetermined  Abnormal ECG  No previous ECGs available    < end of copied text >        Echo:  < from: TTE Echo Doppler w/o Cont (11.19.19 @ 11:50) >  Conclusion:   Technically difficult study  Concentric LVH with normal internal dimensions and systolic function,   estimated LVEF of 65%.   Grossly normal RV size and systolic function.   Normal trileaflet aortic valve, without AI.   Trace physiologic MR and TR.    No significant pericardial effusion.

## 2019-11-21 NOTE — PROVIDER CONTACT NOTE (OTHER) - ASSESSMENT
Pt is alert, able to make needs known. Pt with denies headache or chest pain at this time. No distress noted.

## 2019-11-21 NOTE — H&P ADULT - NSHPLABSRESULTS_GEN_ALL_CORE
EXAM:  MR BRAIN                            PROCEDURE DATE:  11/18/2019          INTERPRETATION:  CLINICAL INDICATION: Dizziness, unsteady gait. Evaluate   for CVA.    TECHNIQUE: Multi-planar multi-sequential MR imaging of the brain was   performed without intravenous contrast.    COMPARISON: Brain MRI, MR angiography head and neck 11/15/2019.    FINDINGS:    There is hyperintensity on diffusion-weighted sequence with associated   signal dropout on corresponding ADC maps in the right brachium pontis   (series 4, images 8-10), findings compatible with evolving acute infarct.   There is no associated hemorrhage.    There are scattered T2/FLAIR hyperintense changes in the periventricular   and subcortical white matter which are nonspecific finding, but most   likely represent sequelae of mild to moderate chronic microvascular   ischemic disease. There is mild diffuse cortical sulcal prominence   related to underlying brain parenchymal volume loss.    There is no intracranial mass. The ventricles are normal without evidence   of hydrocephalus. There are no extra-axial fluid collections. The skull   base flow voids are present.  There is an enlarged, partially empty sella turcica.      The visualized intraorbital contents are normal. There are mild mucosal   inflammatory changes of the ethmoid air cells. The mastoid air cells are   clear. The visualized soft tissues and osseous structures appear   unremarkable.    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 No associated   hemorrhage.    These findings were discussed with Dr. Pang of the patient's clinical   team on 11/18/2019 at approximately 2:00 PM.      ARPAN SALDANA M.D., ATTENDING RADIOLOGIST  This document has been electronically signed. Nov 18 2019  2:11PM

## 2019-11-21 NOTE — PROGRESS NOTE ADULT - ASSESSMENT
55 y/o M with pmh DM, HTN presented with dizziness since Wednesday, found to have acute CVA.  MRA head showed an evolving infarct in the right brachium pontis, though, no hemorrhage.    Acute CVA  - Neuro following  - Continue ASA and statin  - Continue tele monitoring for occult Afib.  NSR on tele.  His EKG showed NSR with non-specific TWI in lead lll  - Allow permissive hypertension per Neuro recs- meds held   -Continue telemetry monitoring, plan for ILR outpatient to rule out occult afib    DM  -management as per primary   Monitor electrolytes, replete to keep K>4 and Mag>2    Will follow closely  Further cardiac w/u pending clinical course  All other w/u per Primary and Neuro    Faustnia Rivera FNP-C  Cardiology NP  [facility] [phone] 53 y/o M with pmh DM, HTN presented with dizziness since Wednesday, found to have acute CVA.  MRA head showed an evolving infarct in the right brachium pontis, though, no hemorrhage.    Acute CVA  - Neuro following  - Continue ASA and statin  - Continue tele monitoring for occult Afib.  NSR on tele.  His EKG showed NSR with non-specific TWI in lead lll  - Allow permissive hypertension per Neuro recs- meds held   - Continue telemetry monitoring, plan for ILR outpatient to rule out occult afib    DM  -management as per primary   Monitor electrolytes, replete to keep K>4 and Mag>2    Will follow closely  Further cardiac w/u pending clinical course  All other w/u per Primary and Neuro    Faustina Rivera FNP-C  Cardiology NP  [facility] [phone]

## 2019-11-21 NOTE — PATIENT PROFILE ADULT - VISION (WITH CORRECTIVE LENSES IF THE PATIENT USUALLY WEARS THEM):
Partially impaired: cannot see medication labels or newsprint, but can see obstacles in path, and the surrounding layout; can count fingers at arm's length/wears contact lenses

## 2019-11-21 NOTE — H&P ADULT - NSHPSOCIALHISTORY_GEN_ALL_CORE
SOCIAL HISTORY    Marital Status - single     FUNCTIONAL HISTORY  Lives in apartment with "few" steps inside. Step to enter shower. Lives on first floor with 2 other roommates  Uses public transportation to get around.      Previous Functional Status:  Independent in ambulation, ADL's, transfers prior to hospitalization    Current Functional Status:  Bed mobility: min assist on brief exam  Transfers: min assist  Gait / ambulation: min assist  Speech: no swallowing issues

## 2019-11-21 NOTE — PROGRESS NOTE ADULT - REASON FOR ADMISSION
possible stroke

## 2019-11-22 LAB
ALBUMIN SERPL ELPH-MCNC: 2.7 G/DL — LOW (ref 3.3–5)
ALP SERPL-CCNC: 70 U/L — SIGNIFICANT CHANGE UP (ref 40–120)
ALT FLD-CCNC: 31 U/L — SIGNIFICANT CHANGE UP (ref 10–45)
ANION GAP SERPL CALC-SCNC: 13 MMOL/L — SIGNIFICANT CHANGE UP (ref 5–17)
AST SERPL-CCNC: 22 U/L — SIGNIFICANT CHANGE UP (ref 10–40)
BASOPHILS # BLD AUTO: 0.08 K/UL — SIGNIFICANT CHANGE UP (ref 0–0.2)
BASOPHILS NFR BLD AUTO: 0.8 % — SIGNIFICANT CHANGE UP (ref 0–2)
BILIRUB SERPL-MCNC: 0.9 MG/DL — SIGNIFICANT CHANGE UP (ref 0.2–1.2)
BUN SERPL-MCNC: 24 MG/DL — HIGH (ref 7–23)
CALCIUM SERPL-MCNC: 8.3 MG/DL — LOW (ref 8.4–10.5)
CHLORIDE SERPL-SCNC: 107 MMOL/L — SIGNIFICANT CHANGE UP (ref 96–108)
CO2 SERPL-SCNC: 21 MMOL/L — LOW (ref 22–31)
CREAT SERPL-MCNC: 1.28 MG/DL — SIGNIFICANT CHANGE UP (ref 0.5–1.3)
EOSINOPHIL # BLD AUTO: 0.17 K/UL — SIGNIFICANT CHANGE UP (ref 0–0.5)
EOSINOPHIL NFR BLD AUTO: 1.8 % — SIGNIFICANT CHANGE UP (ref 0–6)
GLUCOSE BLDC GLUCOMTR-MCNC: 120 MG/DL — HIGH (ref 70–99)
GLUCOSE BLDC GLUCOMTR-MCNC: 143 MG/DL — HIGH (ref 70–99)
GLUCOSE BLDC GLUCOMTR-MCNC: 172 MG/DL — HIGH (ref 70–99)
GLUCOSE BLDC GLUCOMTR-MCNC: 250 MG/DL — HIGH (ref 70–99)
GLUCOSE SERPL-MCNC: 124 MG/DL — HIGH (ref 70–99)
HCT VFR BLD CALC: 47.6 % — SIGNIFICANT CHANGE UP (ref 39–50)
HGB BLD-MCNC: 16.3 G/DL — SIGNIFICANT CHANGE UP (ref 13–17)
IMM GRANULOCYTES NFR BLD AUTO: 0.7 % — SIGNIFICANT CHANGE UP (ref 0–1.5)
LYMPHOCYTES # BLD AUTO: 2.36 K/UL — SIGNIFICANT CHANGE UP (ref 1–3.3)
LYMPHOCYTES # BLD AUTO: 25 % — SIGNIFICANT CHANGE UP (ref 13–44)
MCHC RBC-ENTMCNC: 32.9 PG — SIGNIFICANT CHANGE UP (ref 27–34)
MCHC RBC-ENTMCNC: 34.2 GM/DL — SIGNIFICANT CHANGE UP (ref 32–36)
MCV RBC AUTO: 96 FL — SIGNIFICANT CHANGE UP (ref 80–100)
MONOCYTES # BLD AUTO: 0.84 K/UL — SIGNIFICANT CHANGE UP (ref 0–0.9)
MONOCYTES NFR BLD AUTO: 8.9 % — SIGNIFICANT CHANGE UP (ref 2–14)
NEUTROPHILS # BLD AUTO: 5.91 K/UL — SIGNIFICANT CHANGE UP (ref 1.8–7.4)
NEUTROPHILS NFR BLD AUTO: 62.8 % — SIGNIFICANT CHANGE UP (ref 43–77)
NRBC # BLD: 0 /100 WBCS — SIGNIFICANT CHANGE UP (ref 0–0)
PLATELET # BLD AUTO: 183 K/UL — SIGNIFICANT CHANGE UP (ref 150–400)
POTASSIUM SERPL-MCNC: 3.5 MMOL/L — SIGNIFICANT CHANGE UP (ref 3.5–5.3)
POTASSIUM SERPL-SCNC: 3.5 MMOL/L — SIGNIFICANT CHANGE UP (ref 3.5–5.3)
PROT SERPL-MCNC: 6.7 G/DL — SIGNIFICANT CHANGE UP (ref 6–8.3)
RBC # BLD: 4.96 M/UL — SIGNIFICANT CHANGE UP (ref 4.2–5.8)
RBC # FLD: 11.8 % — SIGNIFICANT CHANGE UP (ref 10.3–14.5)
SODIUM SERPL-SCNC: 141 MMOL/L — SIGNIFICANT CHANGE UP (ref 135–145)
WBC # BLD: 9.43 K/UL — SIGNIFICANT CHANGE UP (ref 3.8–10.5)
WBC # FLD AUTO: 9.43 K/UL — SIGNIFICANT CHANGE UP (ref 3.8–10.5)

## 2019-11-22 PROCEDURE — 99232 SBSQ HOSP IP/OBS MODERATE 35: CPT

## 2019-11-22 PROCEDURE — 99223 1ST HOSP IP/OBS HIGH 75: CPT

## 2019-11-22 RX ORDER — AMLODIPINE BESYLATE 2.5 MG/1
2.5 TABLET ORAL AT BEDTIME
Refills: 0 | Status: DISCONTINUED | OUTPATIENT
Start: 2019-11-22 | End: 2019-11-23

## 2019-11-22 RX ORDER — ASPIRIN/CALCIUM CARB/MAGNESIUM 324 MG
81 TABLET ORAL DAILY
Refills: 0 | Status: DISCONTINUED | OUTPATIENT
Start: 2019-11-22 | End: 2019-12-10

## 2019-11-22 RX ORDER — INSULIN GLARGINE 100 [IU]/ML
8 INJECTION, SOLUTION SUBCUTANEOUS AT BEDTIME
Refills: 0 | Status: DISCONTINUED | OUTPATIENT
Start: 2019-11-22 | End: 2019-11-25

## 2019-11-22 RX ADMIN — Medication 325 MILLIGRAM(S): at 11:54

## 2019-11-22 RX ADMIN — ENOXAPARIN SODIUM 40 MILLIGRAM(S): 100 INJECTION SUBCUTANEOUS at 11:53

## 2019-11-22 RX ADMIN — Medication 1 TABLET(S): at 01:01

## 2019-11-22 RX ADMIN — Medication 3 UNIT(S): at 11:52

## 2019-11-22 RX ADMIN — Medication 37.5 MILLIGRAM(S): at 14:19

## 2019-11-22 RX ADMIN — Medication 2: at 11:52

## 2019-11-22 RX ADMIN — AMLODIPINE BESYLATE 2.5 MILLIGRAM(S): 2.5 TABLET ORAL at 21:23

## 2019-11-22 RX ADMIN — INSULIN GLARGINE 8 UNIT(S): 100 INJECTION, SOLUTION SUBCUTANEOUS at 21:24

## 2019-11-22 RX ADMIN — PANTOPRAZOLE SODIUM 40 MILLIGRAM(S): 20 TABLET, DELAYED RELEASE ORAL at 05:17

## 2019-11-22 RX ADMIN — Medication 3 UNIT(S): at 07:41

## 2019-11-22 RX ADMIN — Medication 37.5 MILLIGRAM(S): at 21:23

## 2019-11-22 RX ADMIN — ATORVASTATIN CALCIUM 80 MILLIGRAM(S): 80 TABLET, FILM COATED ORAL at 21:23

## 2019-11-22 RX ADMIN — Medication 37.5 MILLIGRAM(S): at 05:17

## 2019-11-22 RX ADMIN — Medication 1: at 16:59

## 2019-11-22 NOTE — DIETITIAN INITIAL EVALUATION ADULT. - OTHER INFO
54M with PMH of HTN presented with dizziness/unsteady gait s/p right cerebellar CVA.     Pt tolerating diet with report of good appetite and PO intake. PO intake % per flow sheets. Diet PTA: pt states that he "eats on the go". Bfast: sauer/egg sandwich from [employer] or bagel, fruit; lunch: is candy bar ([address]); Dinner: takeout food - pizza, chinese. Beverage consumption is typically juice, soda, or iced tea. Pt reports NKFA. Denies GI distress- reports last BM this morning. Denies any recent weight changes; reports UBW about 180lbs.    Height: 5'6", Weight: (11/21) 181.8, BMI: 29.3  Skin: intact, Edema: none  IBW range: 128-156lbs

## 2019-11-22 NOTE — CONSULT NOTE ADULT - SUBJECTIVE AND OBJECTIVE BOX
Patient is a 54y old  Male who presents with a chief complaint of Dizziness/vertigo with right cerebellar CVA with deficits (21 Nov 2019 14:58)    HPI:  53 yo male with PMHx of HTN presents to [facility] ED with complaint of dizziness/vertigo and unsteady gait. As per pt report intermittent dizziness lasted for 2 days and progressed to constant dizziness/unable to stand without holding unto objects. Also reported two episodes of vomiting days prior, prompting urgent care evaluation. Patient was given Meclizine at the urgent care and was advised ED eval if symptoms persisted. CT head on admission shows no acute hemorrhage or mass effect. 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 Neurology evaluation requested. EKG NSR. ASA 325mg started.   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 TTE WNL. Symptoms continued to worsen in spite of permissive HTN and repeat CT was performed ruling out hemorrhagic conversion. HbA1c 9.5%. .Pt was also given Augmentin x7 days with Debrox gtts for Otitis Media. (21 Nov 2019 14:58)    On review of chart initial MRI read was negative for acute CVA however there was restricted diffusion involving  right cerebellum consistent with subacute infarct, as patient symptoms worsened a followup MRI was obtained revealing an evolving subacute right brachium pontis cva. Patient was started on plavix, statin, TSH WNL.  Echo performed which was grossly normal.     Interval Hx:  Pt seen/reinterviewed and examined at bedside. He states that he had dizziness and unsteady gait , found to have stroke and is admitted here for rehab. currently denies any symptoms.     PAST MEDICAL & SURGICAL HISTORY:  HTN (hypertension)  No significant past surgical history    Family Hx  Denies any significant family history    Substance Use (street drugs): denies  Tobacco Usage:  denies  Alcohol Usage: denies      Allergies    No Known Allergies    Intolerances        aspirin 325 milliGRAM(s) Oral daily  meclizine 37.5 milliGRAM(s) Oral every 8 hours      REVIEW OF SYSTEMS:  CONSTITUTIONAL: No fever, weight loss, or fatigue  EYES: No eye pain, visual disturbances, or discharge  ENMT:  No difficulty hearing, tinnitus, vertigo; No sinus or throat pain  NECK: No pain or stiffness  BREASTS: No pain, masses, or nipple discharge  RESPIRATORY: No cough, wheezing, chills or hemoptysis; No shortness of breath  CARDIOVASCULAR: No chest pain, palpitations, dizziness, or leg swelling  GASTROINTESTINAL: No abdominal or epigastric pain. No nausea, vomiting, or hematemesis; No diarrhea or constipation. No melena or hematochezia.  GENITOURINARY: No dysuria, frequency, hematuria, or incontinence  NEUROLOGICAL: No headaches, memory loss, loss of strength, numbness, or tremors  SKIN: No itching, burning, rashes, or lesions   LYMPH NODES: No enlarged glands  ENDOCRINE: No heat or cold intolerance; No hair loss  MUSCULOSKELETAL: No joint pain or swelling; No muscle, back, or extremity pain  PSYCHIATRIC: No depression, anxiety, mood swings, or difficulty sleeping  HEME/LYMPH: No easy bruising, or bleeding gums  ALLERY AND IMMUNOLOGIC: No hives or eczema    ALL ROS REVIEWED AND NORMAL EXCEPT AS STATED ABOVE    T(C): 36.9 (11-22-19 @ 07:35), Max: 36.9 (11-21-19 @ 20:14)  HR: 80 (11-22-19 @ 07:35) (64 - 88)  BP: 149/105 (11-22-19 @ 07:35) (140/98 - 161/100)  RR: 14 (11-22-19 @ 07:35) (14 - 18)  SpO2: 96% (11-22-19 @ 07:35) (93% - 97%)  Wt(kg): --Vital Signs Last 24 Hrs  T(C): 36.9 (22 Nov 2019 07:35), Max: 36.9 (21 Nov 2019 20:14)  T(F): 98.4 (22 Nov 2019 07:35), Max: 98.5 (21 Nov 2019 20:14)  HR: 80 (22 Nov 2019 07:35) (64 - 88)  BP: 149/105 (22 Nov 2019 07:35) (140/98 - 161/100)  BP(mean): --  RR: 14 (22 Nov 2019 07:35) (14 - 18)  SpO2: 96% (22 Nov 2019 07:35) (93% - 97%)    PHYSICAL EXAM:  GENERAL: NAD, well-groomed, well-developed  HEAD:  Atraumatic, Normocephalic  EYES: EOMI, PERRLA, conjunctiva and sclera clear  ENMT: No tonsillar erythema, exudates, or enlargement; Moist mucous membranes, Good dentition, No lesions  NECK: Supple, No JVD, Normal thyroid  NERVOUS SYSTEM:  Motor Strength 5/5 B/L upper and lower extremities; Decreased sensation on right side of face. right facial assymetry  Dysmetria on finger to nose test. Unsteady gait  CHEST/LUNG: Clear to percussion bilaterally; No rales, rhonchi, wheezing, or rubs  HEART: Regular rate and rhythm; No murmurs, rubs, or gallops  ABDOMEN: Soft, Nontender, Nondistended; Bowel sounds present  EXTREMITIES:  2+ Peripheral Pulses, No clubbing, cyanosis, or edema  LYMPH: No lymphadenopathy noted  SKIN: No rashes or lesions    LABS:                        16.3   9.43  )-----------( 183      ( 22 Nov 2019 05:35 )             47.6     11-22    141  |  107  |  24<H>  ----------------------------<  124<H>  3.5   |  21<L>  |  1.28    Ca    8.3<L>      22 Nov 2019 05:35    TPro  6.7  /  Alb  2.7<L>  /  TBili  0.9  /  DBili  x   /  AST  22  /  ALT  31  /  AlkPhos  70  11-22         CAPILLARY BLOOD GLUCOSE      POCT Blood Glucose.: 120 mg/dL (22 Nov 2019 07:39)  POCT Blood Glucose.: 171 mg/dL (21 Nov 2019 21:12)  POCT Blood Glucose.: 131 mg/dL (21 Nov 2019 17:10)  POCT Blood Glucose.: 141 mg/dL (21 Nov 2019 11:53)            RADIOLOGY & ADDITIONAL TESTS:    < from: MR Head No Cont (11.18.19 @ 13:07) >  Evolving acute infarct in the right brachium pontis. Noassociated   hemorrhage.    < end of copied text >    < from: MR Head w/wo IV Cont (11.15.19 @ 12:25) >  MRI BRAIN: No acute intracranial findings. Mild to moderate chronic   microvascular ischemic disease.    MRA NECK: No vessel narrowing or occlusion in the neck. Dominant left   vertebral artery.    MRA HEAD: Multifocal loss of flow-related signal within the right   intradural vertebral artery, findings compatible with severe   stenosis/near-complete vessel occlusion.     < end of copied text >    < from: TTE Echo Doppler w/o Cont (11.19.19 @ 11:50) >  Concentric LVH with normal internal dimensions and systolic function,   estimated LVEF of 65%.   Grossly normal RV size and systolic function.   Normal trileaflet aortic valve, without AI.   Trace physiologic MR and TR.    No significant pericardial effusion.    < end of copied text >      Consultant(s) Notes Reviewed:  [x ] YES  [ ] NO  Care Discussed with Consultants/Other Providers [ x] YES  [ ] NO  Imaging Personally Reviewed:  [ ] YES  [ ] NO Patient is a 54y old  Male who presents with a chief complaint of Dizziness/vertigo with right cerebellar CVA with deficits (21 Nov 2019 14:58)    HPI:  55 yo male with PMHx of HTN presents to [facility] ED with complaint of dizziness/vertigo and unsteady gait. As per pt report intermittent dizziness lasted for 2 days and progressed to constant dizziness/unable to stand without holding unto objects. Also reported two episodes of vomiting days prior, prompting urgent care evaluation. Patient was given Meclizine at the urgent care and was advised ED eval if symptoms persisted. CT head on admission shows no acute hemorrhage or mass effect. 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 Neurology evaluation requested. EKG NSR. ASA 325mg started.   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 TTE WNL. Symptoms continued to worsen in spite of permissive HTN and repeat CT was performed ruling out hemorrhagic conversion. HbA1c 9.5%. .Pt was also given Augmentin x7 days with Debrox gtts for Otitis Media. (21 Nov 2019 14:58)    On review of chart initial MRI read was negative for acute CVA however there was restricted diffusion involving  right cerebellum consistent with subacute infarct, as patient symptoms worsened a followup MRI was obtained revealing an evolving subacute right brachium pontis cva. Patient was started on plavix, statin, TSH WNL.  Echo performed which was grossly normal.     Interval Hx:  Pt seen/reinterviewed and examined at bedside. He states that he had dizziness and unsteady gait , found to have stroke and is admitted here for rehab. currently denies any symptoms.     PAST MEDICAL & SURGICAL HISTORY:  HTN (hypertension)  No significant past surgical history    Family Hx  Denies any significant family history    Substance Use (street drugs): denies  Tobacco Usage:  denies  Alcohol Usage: denies      Allergies    No Known Allergies    Intolerances        aspirin 325 milliGRAM(s) Oral daily  meclizine 37.5 milliGRAM(s) Oral every 8 hours      REVIEW OF SYSTEMS:  CONSTITUTIONAL: No fever, weight loss, or fatigue  EYES: No eye pain, visual disturbances, or discharge  ENMT:  No difficulty hearing, tinnitus, vertigo; No sinus or throat pain  NECK: No pain or stiffness  BREASTS: No pain, masses, or nipple discharge  RESPIRATORY: No cough, wheezing, chills or hemoptysis; No shortness of breath  CARDIOVASCULAR: No chest pain, palpitations, dizziness, or leg swelling  GASTROINTESTINAL: No abdominal or epigastric pain. No nausea, vomiting, or hematemesis; No diarrhea or constipation. No melena or hematochezia.  GENITOURINARY: No dysuria, frequency, hematuria, or incontinence  NEUROLOGICAL: No headaches, memory loss, loss of strength, numbness, or tremors  SKIN: No itching, burning, rashes, or lesions   LYMPH NODES: No enlarged glands  ENDOCRINE: No heat or cold intolerance; No hair loss  MUSCULOSKELETAL: No joint pain or swelling; No muscle, back, or extremity pain  PSYCHIATRIC: No depression, anxiety, mood swings, or difficulty sleeping  HEME/LYMPH: No easy bruising, or bleeding gums  ALLERY AND IMMUNOLOGIC: No hives or eczema    ALL ROS REVIEWED AND NORMAL EXCEPT AS STATED ABOVE    T(C): 36.9 (11-22-19 @ 07:35), Max: 36.9 (11-21-19 @ 20:14)  HR: 80 (11-22-19 @ 07:35) (64 - 88)  BP: 149/105 (11-22-19 @ 07:35) (140/98 - 161/100)  RR: 14 (11-22-19 @ 07:35) (14 - 18)  SpO2: 96% (11-22-19 @ 07:35) (93% - 97%)  Wt(kg): --Vital Signs Last 24 Hrs  T(C): 36.9 (22 Nov 2019 07:35), Max: 36.9 (21 Nov 2019 20:14)  T(F): 98.4 (22 Nov 2019 07:35), Max: 98.5 (21 Nov 2019 20:14)  HR: 80 (22 Nov 2019 07:35) (64 - 88)  BP: 149/105 (22 Nov 2019 07:35) (140/98 - 161/100)  BP(mean): --  RR: 14 (22 Nov 2019 07:35) (14 - 18)  SpO2: 96% (22 Nov 2019 07:35) (93% - 97%)    PHYSICAL EXAM:  GENERAL: NAD, well-groomed, well-developed  HEAD:  Atraumatic, Normocephalic  EYES: EOMI, PERRLA, conjunctiva and sclera clear  ENMT: No tonsillar erythema, exudates, or enlargement; Moist mucous membranes, Good dentition, No lesions  NECK: Supple, No JVD, Normal thyroid  NERVOUS SYSTEM:  Motor Strength 5/5 B/L upper and lower extremities; Decreased sensation on right side of face. right facial assymetry  Dysmetria on finger to nose test. Unsteady gait  CHEST/LUNG: Clear to percussion bilaterally; No rales, rhonchi, wheezing, or rubs  HEART: Regular rate and rhythm; No murmurs, rubs, or gallops  ABDOMEN: Soft, Nontender, Nondistended; Bowel sounds present  EXTREMITIES:  2+ Peripheral Pulses, No clubbing, cyanosis, or edema  LYMPH: No lymphadenopathy noted  SKIN: No rashes or lesions    LABS:                        16.3   9.43  )-----------( 183      ( 22 Nov 2019 05:35 )             47.6     11-22    141  |  107  |  24<H>  ----------------------------<  124<H>  3.5   |  21<L>  |  1.28    Ca    8.3<L>      22 Nov 2019 05:35    TPro  6.7  /  Alb  2.7<L>  /  TBili  0.9  /  DBili  x   /  AST  22  /  ALT  31  /  AlkPhos  70  11-22         CAPILLARY BLOOD GLUCOSE      POCT Blood Glucose.: 120 mg/dL (22 Nov 2019 07:39)  POCT Blood Glucose.: 171 mg/dL (21 Nov 2019 21:12)  POCT Blood Glucose.: 131 mg/dL (21 Nov 2019 17:10)  POCT Blood Glucose.: 141 mg/dL (21 Nov 2019 11:53)            RADIOLOGY & ADDITIONAL TESTS:    < from: MR Head No Cont (11.18.19 @ 13:07) >  Evolving acute infarct in the right brachium pontis. Noassociated   hemorrhage.    < end of copied text >    < from: MR Head w/wo IV Cont (11.15.19 @ 12:25) >  MRI BRAIN: No acute intracranial findings. Mild to moderate chronic   microvascular ischemic disease.    MRA NECK: No vessel narrowing or occlusion in the neck. Dominant left   vertebral artery.    MRA HEAD: Multifocal loss of flow-related signal within the right   intradural vertebral artery, findings compatible with severe   stenosis/near-complete vessel occlusion.     < end of copied text >    < from: TTE Echo Doppler w/o Cont (11.19.19 @ 11:50) >  Concentric LVH with normal internal dimensions and systolic function,   estimated LVEF of 65%.   Grossly normal RV size and systolic function.   Normal trileaflet aortic valve, without AI.   Trace physiologic MR and TR.    No significant pericardial effusion.    < end of copied text >      Consultant(s) Notes Reviewed:  [x ] YES  [ ] NO  Care Discussed with Consultants/Other Providers [ x] YES  [ ] NO  Imaging Personally Reviewed:  [x ] YES  [ ] NO

## 2019-11-22 NOTE — PROGRESS NOTE ADULT - ASSESSMENT
55 yo male s/p right sided cerebellar CVA with unsteady gait and functional deficits admitted to rehab [facility]      #CVA  -start PT/OT/SLP program, fall precautions. Lovenox for DVT ppx  -CTA head and neck: Diffusely hypoplastic right vertebral artery with calcifications,  - ASA 81mg/Lipitor 80mg. Follow LFTs on high dose statin.       #HTN  -renal evaluation for elevated BP, reports 'taking metoprolol' prior, not sure of dose.        #Dizziness w/ vertigo  -meclizine TID. Fall precautions      #DM2  -Humalog, FS ACHS, diabetic education for A1C>9%. Consistent carbs/DASH diet. Evaluated by medicine c plan Lantus. Follow BG for now.       #Otitis Media:  - R ear fullness resolved, denies pain   - Augmentin completed

## 2019-11-22 NOTE — CONSULT NOTE ADULT - SUBJECTIVE AND OBJECTIVE BOX
NEPHROLOGY CONSULTATION    CHIEF COMPLAINT: CVA    HPI:  Pt is 53 yo male with PMH of HTN presents to [facility] ED with complaint of dizziness/vertigo and unsteady gait. As per pt report intermittent dizziness lasted for 2 days and progressed to constant dizziness/unable to stand without holding unto objects. Also reported two episodes of vomiting days prior, prompting urgent care evaluation. Patient was given Meclizine at the urgent care and was advised ED eval if symptoms persisted. CT head on admission showed no acute hemorrhage or mass effect. CTA head and neck showed diffusely hypoplastic right vertebral artery with calcifications, irregularity and intermittent enhancement of the right V4 segment, suspicious for stenosis and/or occlusion. Neurology evaluation requested. EKG NSR. ASA 325mg started. 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 TTE WNL. Symptoms continued to worsen in spite of permissive HTN and repeat CT was performed ruling out hemorrhagic conversion. Asked to eval for BP control.     ROS:  as above    Allergies:  No Known Allergies    PAST MEDICAL & SURGICAL HISTORY:  HTN (hypertension)  No significant past surgical history    SOCIAL HISTORY:  negative    FAMILY HISTORY:  No pertinent family history in first degree relatives    MEDICATIONS  (STANDING):  amLODIPine   Tablet 2.5 milliGRAM(s) Oral at bedtime  aspirin  chewable 81 milliGRAM(s) Oral daily  atorvastatin 80 milliGRAM(s) Oral at bedtime  dextrose 5%. 1000 milliLiter(s) (50 mL/Hr) IV Continuous <Continuous>  dextrose 50% Injectable 12.5 Gram(s) IV Push once  dextrose 50% Injectable 25 Gram(s) IV Push once  dextrose 50% Injectable 25 Gram(s) IV Push once  enoxaparin Injectable 40 milliGRAM(s) SubCutaneous daily  insulin glargine Injectable (LANTUS) 8 Unit(s) SubCutaneous at bedtime  insulin lispro (HumaLOG) corrective regimen sliding scale   SubCutaneous three times a day before meals  insulin lispro (HumaLOG) corrective regimen sliding scale   SubCutaneous at bedtime  meclizine 37.5 milliGRAM(s) Oral every 8 hours  pantoprazole    Tablet 40 milliGRAM(s) Oral before breakfast    Vital Signs Last 24 Hrs  T(C): 36.9 (11-22-19 @ 07:35), Max: 36.9 (11-21-19 @ 20:14)  T(F): 98.4 (11-22-19 @ 07:35), Max: 98.5 (11-21-19 @ 20:14)  HR: 80 (11-22-19 @ 07:35) (73 - 88)  BP: 149/105 (11-22-19 @ 07:35) (140/98 - 161/100)  RR: 14 (11-22-19 @ 07:35) (14 - 14)  SpO2: 96% (11-22-19 @ 07:35) (95% - 96%)    LABS:                        16.3   9.43  )-----------( 183      ( 22 Nov 2019 05:35 )             47.6     11-22    141  |  107  |  24<H>  ----------------------------<  124<H>  3.5   |  21<L>  |  1.28    Ca    8.3<L>      22 Nov 2019 05:35    TPro  6.7  /  Alb  2.7<L>  /  TBili  0.9  /  DBili  x   /  AST  22  /  ALT  31  /  AlkPhos  70  11-22    LIVER FUNCTIONS - ( 22 Nov 2019 05:35 )  Alb: 2.7 g/dL / Pro: 6.7 g/dL / ALK PHOS: 70 U/L / ALT: 31 U/L / AST: 22 U/L / GGT: x           A/P: NEPHROLOGY CONSULTATION    CHIEF COMPLAINT: CVA    HPI:  Pt is 55 yo male with PMH of HTN presents to [facility] ED with complaint of dizziness/vertigo and unsteady gait. As per pt report intermittent dizziness lasted for 2 days and progressed to constant dizziness/unable to stand without holding unto objects. Also reported two episodes of vomiting days prior, prompting urgent care evaluation. Patient was given Meclizine at the urgent care and was advised ED eval if symptoms persisted. CT head on admission showed no acute hemorrhage or mass effect. CTA head and neck showed diffusely hypoplastic right vertebral artery with calcifications, irregularity and intermittent enhancement of the right V4 segment, suspicious for stenosis and/or occlusion. Neurology evaluation requested. EKG NSR. ASA 325mg started. 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 TTE WNL. Symptoms continued to worsen in spite of permissive HTN and repeat CT was performed ruling out hemorrhagic conversion. Asked to eval for BP control. Awake, alert, denies CP, SOB, N/V/D/C/F/C.     ROS:  as above    Allergies:  No Known Allergies    PAST MEDICAL & SURGICAL HISTORY:  HTN (hypertension)  No significant past surgical history    SOCIAL HISTORY:  negative    FAMILY HISTORY:  No pertinent family history in first degree relatives    MEDICATIONS  (STANDING):  amLODIPine   Tablet 2.5 milliGRAM(s) Oral at bedtime  aspirin  chewable 81 milliGRAM(s) Oral daily  atorvastatin 80 milliGRAM(s) Oral at bedtime  dextrose 5%. 1000 milliLiter(s) (50 mL/Hr) IV Continuous <Continuous>  dextrose 50% Injectable 12.5 Gram(s) IV Push once  dextrose 50% Injectable 25 Gram(s) IV Push once  dextrose 50% Injectable 25 Gram(s) IV Push once  enoxaparin Injectable 40 milliGRAM(s) SubCutaneous daily  insulin glargine Injectable (LANTUS) 8 Unit(s) SubCutaneous at bedtime  insulin lispro (HumaLOG) corrective regimen sliding scale   SubCutaneous three times a day before meals  insulin lispro (HumaLOG) corrective regimen sliding scale   SubCutaneous at bedtime  meclizine 37.5 milliGRAM(s) Oral every 8 hours  pantoprazole    Tablet 40 milliGRAM(s) Oral before breakfast    Vital Signs Last 24 Hrs  T(C): 36.9 (11-22-19 @ 07:35), Max: 36.9 (11-21-19 @ 20:14)  T(F): 98.4 (11-22-19 @ 07:35), Max: 98.5 (11-21-19 @ 20:14)  HR: 80 (11-22-19 @ 07:35) (73 - 88)  BP: 149/105 (11-22-19 @ 07:35) (140/98 - 161/100)  RR: 14 (11-22-19 @ 07:35) (14 - 14)  SpO2: 96% (11-22-19 @ 07:35) (95% - 96%)    Card S1S2  Lungs b/l air entry  Abd soft, NT, ND  Extr no edema    LABS:                        16.3   9.43  )-----------( 183      ( 22 Nov 2019 05:35 )             47.6     11-22    141  |  107  |  24<H>  ----------------------------<  124<H>  3.5   |  21<L>  |  1.28    Ca    8.3<L>      22 Nov 2019 05:35    TPro  6.7  /  Alb  2.7<L>  /  TBili  0.9  /  DBili  x   /  AST  22  /  ALT  31  /  AlkPhos  70  11-22    LIVER FUNCTIONS - ( 22 Nov 2019 05:35 )  Alb: 2.7 g/dL / Pro: 6.7 g/dL / ALK PHOS: 70 U/L / ALT: 31 U/L / AST: 22 U/L / GGT: x           A/P:    Pontine CVA  Persistent dizziness  HTN  Will start Norvasc 2.5mg QHS and f/u BP  Will adjust Norvasc as needed

## 2019-11-22 NOTE — CONSULT NOTE ADULT - ASSESSMENT
54M with PMH of HTN presented with dizziness/unsteady gait Found to have subacute infarction and evolving infarction in right brachium pontis is admitted here for rehab program.    #Right cerebellar CVA (Subacute/acute evolving infarction)  Cont with comprehensive rehab program  cont with high dose aspirin/plavix and statin  Suggest Vascular eval for intradural severe vertebral artery stenosis/near complete occlusion -- see MRA findings above . Unsure if this was addressed in prior admission at other facility  Neuro follow up  Meclizine prn for dizziness  Echo findings reviewed as above     #HTN  home meds amlodipine/metoprolol on hold  to allow permissive hypertension on review of chart with goal sbp of 160  Needs neuro follow up  monitor BP    #Newly diagnosed Diabetes type 2  11-15 [id number] 9.5  POCT Blood Glucose.: 120 mg/dL (22 Nov 2019 07:39)  POCT Blood Glucose.: 171 mg/dL (21 Nov 2019 21:12)  POCT Blood Glucose.: 131 mg/dL (21 Nov 2019 17:10)  POCT Blood Glucose.: 141 mg/dL (21 Nov 2019 11:53)    Insulin Regimen: Cont with premeals and ISS for now. If remains elevated will add Lantus and adjust premeals/sliding scale accordingly.  Diabetic Education  Needs Endo follow up upon discharge given newly diagnosed DM2    #Otitis Media  complete Augmentin course of 7 days    #DVT PPX: s/c lovenox 54M with PMH of HTN presented with dizziness/unsteady gait Found to have subacute infarction and evolving infarction in right brachium pontis is admitted here for rehab program.    #Right cerebellar CVA (Subacute/acute evolving infarction)  Cont with comprehensive rehab program  cont with high dose aspirin/statin  Suggest Vascular eval for intradural severe vertebral artery stenosis/near complete occlusion -- see MRA findings above . Unsure if this was addressed in prior admission at other facility  Neuro follow up  Meclizine prn for dizziness  Echo findings reviewed as above     #HTN  home meds amlodipine/metoprolol on hold  to allow permissive hypertension on review of chart with goal sbp of 160  Needs neuro follow up  monitor BP    #Newly diagnosed Diabetes type 2  11-15 [id number] 9.5  POCT Blood Glucose.: 120 mg/dL (22 Nov 2019 07:39)  POCT Blood Glucose.: 171 mg/dL (21 Nov 2019 21:12)  POCT Blood Glucose.: 131 mg/dL (21 Nov 2019 17:10)  POCT Blood Glucose.: 141 mg/dL (21 Nov 2019 11:53)    Insulin Regimen: Cont with premeals and ISS for now. If remains elevated will add Lantus and adjust premeals/sliding scale accordingly.  Diabetic Education  Needs Endo follow up upon discharge given newly diagnosed DM2    #Otitis Media  complete Augmentin course of 7 days    #DVT PPX: s/c lovenox 54M with PMH of HTN presented with dizziness/unsteady gait Found to have subacute infarction and evolving infarction in right brachium pontis is admitted here for rehab program.    #Right cerebellar CVA (Subacute/acute evolving infarction)  Cont with comprehensive rehab program  cont with high dose aspirin/statin  Neuro follow up  Meclizine prn for dizziness  Echo findings reviewed as above     #HTN  home meds amlodipine/metoprolol on hold  to allow permissive hypertension   Keep SBP >160 for now , Neuro follow up  monitor BP    #Newly diagnosed Diabetes type 2  11-15 [id number] 9.5  POCT Blood Glucose.: 120 mg/dL (22 Nov 2019 07:39)  POCT Blood Glucose.: 171 mg/dL (21 Nov 2019 21:12)  POCT Blood Glucose.: 131 mg/dL (21 Nov 2019 17:10)  POCT Blood Glucose.: 141 mg/dL (21 Nov 2019 11:53)    Insulin Regimen: Cont with premeals and ISS for now. If remains elevated will add Lantus and adjust premeals/sliding scale accordingly.  Diabetic Education  Needs Endo follow up upon discharge given newly diagnosed DM2    #Otitis Media  complete Augmentin course of 7 days    #DVT PPX: s/c lovenox 54M with PMH of HTN presented with dizziness/unsteady gait Found to have subacute infarction and evolving infarction in right brachium pontis is admitted here for rehab program.    #Right cerebellar CVA (Subacute/acute evolving infarction)  Cont with comprehensive rehab program  cont with high dose aspirin/statin  Neuro follow up to see if high dose of ASA is needed and if pt needs to be on DAPT given severe stenosis/near-complete vessel occlusion of intradural vertebral artery and evolving infarction  Meclizine prn for dizziness  Echo findings reviewed as above     #HTN  home meds amlodipine/metoprolol on hold  to allow permissive hypertension   Keep SBP >160 for now , Needs Neuro follow up to know the duration to keep pt w/permissive HTN  monitor BP    #Newly diagnosed Diabetes type 2  11-15 [id number] 9.5  POCT Blood Glucose.: 120 mg/dL (22 Nov 2019 07:39)  POCT Blood Glucose.: 171 mg/dL (21 Nov 2019 21:12)  POCT Blood Glucose.: 131 mg/dL (21 Nov 2019 17:10)  POCT Blood Glucose.: 141 mg/dL (21 Nov 2019 11:53)    Insulin Regimen: Cont with premeals and ISS for now. If remains elevated will add Lantus and adjust premeals/sliding scale accordingly.  Diabetic Education    #Otitis Media  complete Augmentin course of 7 days    #DVT PPX: s/c lovenox

## 2019-11-22 NOTE — DIETITIAN INITIAL EVALUATION ADULT. - ADD RECOMMEND
Consistent carbohydrate, heart healthy diet reviewed. Education materials previously received by RD at [facility]. Recommend consult with CDE. Would suggest outpatient follow up with RD/CDE given new onset DM to ensure long term compliance with recommendations. Trend weights, labs & PO intake.

## 2019-11-22 NOTE — PROGRESS NOTE ADULT - SUBJECTIVE AND OBJECTIVE BOX
Rehab progress    CHIEF COMPLAINT: Dizziness and unsteady gait.       HISTORY OF PRESENT ILLNESS  53 yo male with PMHx of HTN presents to [facility] ED with complaint of dizziness/vertigo and unsteady gait. As per pt report intermittent dizziness lasted for 2 days and progressed to constant dizziness/unable to stand without holding unto objects. Also reported two episodes of vomiting days prior, prompting urgent care evaluation. Patient was given Meclizine at the urgent care and was advised ED eval if symptoms persisted. CT head on admission shows no acute hemorrhage or mass effect. 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 Neurology evaluation requested. EKG NSR. ASA 325mg started.   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 TTE WNL. Symptoms continued to worsen in spite of permissive HTN and repeat CT was performed ruling out hemorrhagic conversion. HbA1c 9.5%. .   *Augmentin x7 days with Debrox gtts for Otitis Media. (21 Nov 2019 14:58)      PAST MEDICAL & SURGICAL HISTORY:  HTN (hypertension)  No significant past surgical history       REVIEW OF SYMPTOMS  [X] Constitutional WNL           [X] Resp WNL           [X] GI WNL                          [X]  WNL                   [X] Heme WNL                      [X] Skin WNL                 [X] MSK WNL                      [X] Cognitive WNL        [X] Psych WNL      VITALS  Vital Signs Last 24 Hrs  T(C): 36.9 (22 Nov 2019 07:35), Max: 36.9 (21 Nov 2019 20:14)  T(F): 98.4 (22 Nov 2019 07:35), Max: 98.5 (21 Nov 2019 20:14)  HR: 80 (22 Nov 2019 07:35) (73 - 88)  BP: 149/105 (22 Nov 2019 07:35) (140/98 - 161/100)  BP(mean): --  RR: 14 (22 Nov 2019 07:35) (14 - 14)  SpO2: 96% (22 Nov 2019 07:35) (95% - 96%)      PHYSICAL EXAM  Constitutional - NAD  HEENT - NCAT, EOMI  Neck - Supple, No limited ROM  Chest - CTA bilaterally, No wheeze, No rhonchi, No crackles  Cardiovascular - RRR, S1S2, No murmurs  Abdomen - BS+, Soft, NTND  Extremities - No C/C/E, No calf tenderness   Skin-no rash  Wounds-none      Neurologic Exam -Awake, Alert, AAO to self, place, time, situation. Dysarthria/hesistancy. Dysmetria/incoordination/poor balance.       Cranial Nerves - Nystagmus.     Sensory-impaired LT on right     Psychiatric - Mood stable, Affect WNL       FUNCTIONAL PROGRESS  Gait - eval  ADLs - eval  Transfers - eval  Functional transfer - eval    RECENT LABS                        16.3   9.43  )-----------( 183      ( 22 Nov 2019 05:35 )             47.6     11-22    141  |  107  |  24<H>  ----------------------------<  124<H>  3.5   |  21<L>  |  1.28    Ca    8.3<L>      22 Nov 2019 05:35    TPro  6.7  /  Alb  2.7<L>  /  TBili  0.9  /  DBili  x   /  AST  22  /  ALT  31  /  AlkPhos  70  11-22      LIVER FUNCTIONS - ( 22 Nov 2019 05:35 )  Alb: 2.7 g/dL / Pro: 6.7 g/dL / ALK PHOS: 70 U/L / ALT: 31 U/L / AST: 22 U/L / GGT: x             Direct LDL: 239 mg/dL (11-15-19 @ 08:31)    Hemoglobin A1C, Whole Blood: 9.5 % (11-15-19 @ 08:34)              RADIOLOGY/OTHER RESULTS      CURRENT MEDICATIONS  MEDICATIONS  (STANDING):  aspirin  chewable 81 milliGRAM(s) Oral daily  atorvastatin 80 milliGRAM(s) Oral at bedtime  dextrose 5%. 1000 milliLiter(s) (50 mL/Hr) IV Continuous <Continuous>  dextrose 50% Injectable 12.5 Gram(s) IV Push once  dextrose 50% Injectable 25 Gram(s) IV Push once  dextrose 50% Injectable 25 Gram(s) IV Push once  enoxaparin Injectable 40 milliGRAM(s) SubCutaneous daily  insulin lispro (HumaLOG) corrective regimen sliding scale   SubCutaneous three times a day before meals  insulin lispro (HumaLOG) corrective regimen sliding scale   SubCutaneous at bedtime  insulin lispro Injectable (HumaLOG) 3 Unit(s) SubCutaneous three times a day before meals  meclizine 37.5 milliGRAM(s) Oral every 8 hours  pantoprazole    Tablet 40 milliGRAM(s) Oral before breakfast    MEDICATIONS  (PRN):  dextrose 40% Gel 15 Gram(s) Oral once PRN Blood Glucose LESS THAN 70 milliGRAM(s)/deciliter  glucagon  Injectable 1 milliGRAM(s) IntraMuscular once PRN Glucose LESS THAN 70 milligrams/deciliter      ASSESSMENT & PLAN      GI/Bowel Management - prn Miralax   Management - Toilet Q2  Skin - Turn Q2  Pain - Tylenol PRN  DVT PPX - Lovenox  Diet - consistent carbs/dash    Start comprehensive acute rehab program consisting of 3hrs/day of OT/PT and SLP.

## 2019-11-23 LAB
GLUCOSE BLDC GLUCOMTR-MCNC: 129 MG/DL — HIGH (ref 70–99)
GLUCOSE BLDC GLUCOMTR-MCNC: 174 MG/DL — HIGH (ref 70–99)
GLUCOSE BLDC GLUCOMTR-MCNC: 214 MG/DL — HIGH (ref 70–99)
GLUCOSE BLDC GLUCOMTR-MCNC: 227 MG/DL — HIGH (ref 70–99)

## 2019-11-23 PROCEDURE — 99232 SBSQ HOSP IP/OBS MODERATE 35: CPT

## 2019-11-23 RX ORDER — AMLODIPINE BESYLATE 2.5 MG/1
5 TABLET ORAL DAILY
Refills: 0 | Status: DISCONTINUED | OUTPATIENT
Start: 2019-11-23 | End: 2019-11-25

## 2019-11-23 RX ORDER — AMLODIPINE BESYLATE 2.5 MG/1
2.5 TABLET ORAL AT BEDTIME
Refills: 0 | Status: DISCONTINUED | OUTPATIENT
Start: 2019-11-23 | End: 2019-11-23

## 2019-11-23 RX ADMIN — Medication 2: at 11:39

## 2019-11-23 RX ADMIN — ATORVASTATIN CALCIUM 80 MILLIGRAM(S): 80 TABLET, FILM COATED ORAL at 21:28

## 2019-11-23 RX ADMIN — Medication 2: at 16:49

## 2019-11-23 RX ADMIN — ENOXAPARIN SODIUM 40 MILLIGRAM(S): 100 INJECTION SUBCUTANEOUS at 12:54

## 2019-11-23 RX ADMIN — Medication 81 MILLIGRAM(S): at 12:54

## 2019-11-23 RX ADMIN — AMLODIPINE BESYLATE 5 MILLIGRAM(S): 2.5 TABLET ORAL at 21:53

## 2019-11-23 RX ADMIN — PANTOPRAZOLE SODIUM 40 MILLIGRAM(S): 20 TABLET, DELAYED RELEASE ORAL at 06:01

## 2019-11-23 RX ADMIN — Medication 37.5 MILLIGRAM(S): at 14:11

## 2019-11-23 RX ADMIN — Medication 37.5 MILLIGRAM(S): at 21:28

## 2019-11-23 RX ADMIN — INSULIN GLARGINE 8 UNIT(S): 100 INJECTION, SOLUTION SUBCUTANEOUS at 21:29

## 2019-11-23 RX ADMIN — Medication 37.5 MILLIGRAM(S): at 06:01

## 2019-11-23 NOTE — PROGRESS NOTE ADULT - SUBJECTIVE AND OBJECTIVE BOX
Patient is a 54y old  Male who presents with a chief complaint of s/p right cerebellar CVA with deficits (23 Nov 2019 07:57)      Patient seen in follow up for hypertension.     PAST MEDICAL HISTORY:  HTN (hypertension)  No pertinent past medical history    MEDICATIONS  (STANDING):  amLODIPine   Tablet 2.5 milliGRAM(s) Oral at bedtime  aspirin  chewable 81 milliGRAM(s) Oral daily  atorvastatin 80 milliGRAM(s) Oral at bedtime  dextrose 5%. 1000 milliLiter(s) (50 mL/Hr) IV Continuous <Continuous>  dextrose 50% Injectable 12.5 Gram(s) IV Push once  dextrose 50% Injectable 25 Gram(s) IV Push once  dextrose 50% Injectable 25 Gram(s) IV Push once  enoxaparin Injectable 40 milliGRAM(s) SubCutaneous daily  insulin glargine Injectable (LANTUS) 8 Unit(s) SubCutaneous at bedtime  insulin lispro (HumaLOG) corrective regimen sliding scale   SubCutaneous three times a day before meals  insulin lispro (HumaLOG) corrective regimen sliding scale   SubCutaneous at bedtime  meclizine 37.5 milliGRAM(s) Oral every 8 hours  pantoprazole    Tablet 40 milliGRAM(s) Oral before breakfast    MEDICATIONS  (PRN):  dextrose 40% Gel 15 Gram(s) Oral once PRN Blood Glucose LESS THAN 70 milliGRAM(s)/deciliter  glucagon  Injectable 1 milliGRAM(s) IntraMuscular once PRN Glucose LESS THAN 70 milligrams/deciliter    T(C): 36.7 (11-23-19 @ 11:35), Max: 36.9 (11-22-19 @ 07:35)  HR: 87 (11-23-19 @ 11:35) (65 - 87)  BP: 142/107 (11-23-19 @ 11:35) (142/107 - 162/92)  RR: 14 (11-23-19 @ 11:35) (14 - 14)  SpO2: 96% (11-23-19 @ 11:35) (96% - 96%)  Wt(kg): --  I&O's Detail    22 Nov 2019 07:01  -  23 Nov 2019 07:00  --------------------------------------------------------  IN:    Oral Fluid: 840 mL  Total IN: 840 mL    OUT:    Voided: 600 mL  Total OUT: 600 mL    Total NET: 240 mL          PHYSICAL EXAM:  General: NAD  Respiratory: b/l air entry  Cardiovascular: S1 S2  Gastrointestinal: soft  Extremities:  no edema                          16.3   9.43  )-----------( 183      ( 22 Nov 2019 05:35 )             47.6     11-22    141  |  107  |  24<H>  ----------------------------<  124<H>  3.5   |  21<L>  |  1.28    Ca    8.3<L>      22 Nov 2019 05:35    TPro  6.7  /  Alb  2.7<L>  /  TBili  0.9  /  DBili  x   /  AST  22  /  ALT  31  /  AlkPhos  70  11-22        LIVER FUNCTIONS - ( 22 Nov 2019 05:35 )  Alb: 2.7 g/dL / Pro: 6.7 g/dL / ALK PHOS: 70 U/L / ALT: 31 U/L / AST: 22 U/L / GGT: x               Sodium, Serum: 141 (11-22 @ 05:35)  Sodium, Serum: 140 (11-21 @ 06:46)  Sodium, Serum: 140 (11-20 @ 06:33)    Creatinine, Serum: 1.28 (11-22 @ 05:35)  Creatinine, Serum: 1.30 (11-21 @ 06:46)  Creatinine, Serum: 1.20 (11-20 @ 06:33)    Potassium, Serum: 3.5 (11-22 @ 05:35)  Potassium, Serum: 3.7 (11-21 @ 06:46)  Potassium, Serum: 3.6 (11-20 @ 06:33)    Hemoglobin: 16.3 (11-22 @ 05:35)  Hemoglobin: 17.4 (11-21 @ 06:46)  Hemoglobin: 17.4 (11-20 @ 06:33)

## 2019-11-23 NOTE — PROGRESS NOTE ADULT - ASSESSMENT
55 yo male s/p right sided cerebellar CVA with unsteady gait and functional deficits admitted to rehab [facility]      #CVA  -continue PT/OT/SLP program, fall precautions. Lovenox for DVT ppx  -CTA head and neck: Diffusely hypoplastic right vertebral artery with calcifications,  - ASA 81mg/Lipitor 80mg. Follow LFTs on high dose statin.       #HTN  162/92 11/23  -renal evaluation for elevated BP, reports 'taking metoprolol' prior, not sure of dose.        #Dizziness w/ vertigo  -meclizine TID. Fall precautions      #DM2  -Humalog, FS ACHS, diabetic education for A1C>9%. Consistent carbs/DASH diet. Evaluated by medicine c plan Lantus. Follow BG for now.       #Otitis Media:  - R ear fullness resolved, denies pain   - Augmentin completed    GI/Bowel Management - prn Miralax   Management - Toilet Q2  Pain - Tylenol PRN  DVT PPX - Lovenox  Diet - consistent carbs/dash 55 yo male s/p right sided cerebellar CVA with unsteady gait and functional deficits admitted to rehab [facility]      #CVA  -continue PT/OT/SLP program, fall precautions. Lovenox for DVT ppx  -CTA head and neck: Diffusely hypoplastic right vertebral artery with calcifications,  - ASA 81mg/Lipitor 80mg. Follow LFTs on high dose statin.       #HTN  162/92 11/23  -renal evaluation appreciated  -norvasc 2.5mg QHS, monitor      #Dizziness w/ vertigo  -meclizine TID. Fall precautions      #DM2  -Humalog, FS ACHS, diabetic education for A1C>9%. Consistent carbs/DASH diet. Evaluated by medicine c plan Lantus. Follow BG for now.       #Otitis Media:  - R ear fullness resolved, denies pain   - Augmentin completed    GI/Bowel Management - prn Miralax   Management - Toilet Q2  Pain - Tylenol PRN  DVT PPX - Lovenox  Diet - consistent carbs/dash

## 2019-11-23 NOTE — PROGRESS NOTE ADULT - SUBJECTIVE AND OBJECTIVE BOX
CHIEF COMPLAINT: Dizziness and unsteady gait.       HISTORY OF PRESENT ILLNESS  55 yo male with PMHx of HTN presents to [facility] ED with complaint of dizziness/vertigo and unsteady gait. As per pt report intermittent dizziness lasted for 2 days and progressed to constant dizziness/unable to stand without holding unto objects. Also reported two episodes of vomiting days prior, prompting urgent care evaluation. Patient was given Meclizine at the urgent care and was advised ED eval if symptoms persisted. CT head on admission shows no acute hemorrhage or mass effect. 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 Neurology evaluation requested. EKG NSR. ASA 325mg started.   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 TTE WNL. Symptoms continued to worsen in spite of permissive HTN and repeat CT was performed ruling out hemorrhagic conversion. HbA1c 9.5%. .   *Augmentin x7 days with Debrox gtts for Otitis Media. (21 Nov 2019 14:58)      PAST MEDICAL & SURGICAL HISTORY:  HTN (hypertension)  No significant past surgical history       REVIEW OF SYMPTOMS/Subjective: No SOB, no n/v, no pain  [X] Constitutional WNL           [X] Resp WNL           [X] GI WNL                          [X]  WNL                   [ ] Heme WNL                      [X] Skin WNL                 [X] MSK WNL                      [ ] Cognitive WNL        [ ] Psych WNL      VITALS  Vital Signs Last 24 Hrs  T(C): 36.7 (22 Nov 2019 21:21), Max: 36.7 (22 Nov 2019 21:21)  T(F): 98 (22 Nov 2019 21:21), Max: 98 (22 Nov 2019 21:21)  HR: 65 (22 Nov 2019 21:21) (65 - 65)  BP: 162/92 (22 Nov 2019 21:21) (162/92 - 162/92)  BP(mean): --  RR: 14 (22 Nov 2019 21:21) (14 - 14)  SpO2: 96% (22 Nov 2019 21:21) (96% - 96%)      PHYSICAL EXAM  Constitutional - NAD  HEENT - NCAT  Neck - Supple  Chest - CTA bilaterally, No wheeze, No rhonchi  Cardiovascular - RRR, S1S2  Abdomen - BS+, Soft, NTND  Extremities - No C/C/E, No calf tenderness   Skin-no rash  Wounds-none      Neurologic Exam -Awake, Alert, AAO to self, place, time, situation. Rt Dysarthria/hesistancy.     motor: no hemiparesis     Sensory-impaired LT on right     Psychiatric - Mood stable, Affect WNL       RECENT LABS                        16.3   9.43  )-----------( 183      ( 22 Nov 2019 05:35 )             47.6     11-22    141  |  107  |  24<H>  ----------------------------<  124<H>  3.5   |  21<L>  |  1.28    Ca    8.3<L>      22 Nov 2019 05:35    TPro  6.7  /  Alb  2.7<L>  /  TBili  0.9  /  DBili  x   /  AST  22  /  ALT  31  /  AlkPhos  70  11-22      LIVER FUNCTIONS - ( 22 Nov 2019 05:35 )  Alb: 2.7 g/dL / Pro: 6.7 g/dL / ALK PHOS: 70 U/L / ALT: 31 U/L / AST: 22 U/L / GGT: x             Direct LDL: 239 mg/dL (11-15-19 @ 08:31)    Hemoglobin A1C, Whole Blood: 9.5 % (11-15-19 @ 08:34)              RADIOLOGY/OTHER RESULTS      CURRENT MEDICATIONS  MEDICATIONS  (STANDING):  aspirin  chewable 81 milliGRAM(s) Oral daily  atorvastatin 80 milliGRAM(s) Oral at bedtime  dextrose 5%. 1000 milliLiter(s) (50 mL/Hr) IV Continuous <Continuous>  dextrose 50% Injectable 12.5 Gram(s) IV Push once  dextrose 50% Injectable 25 Gram(s) IV Push once  dextrose 50% Injectable 25 Gram(s) IV Push once  enoxaparin Injectable 40 milliGRAM(s) SubCutaneous daily  insulin lispro (HumaLOG) corrective regimen sliding scale   SubCutaneous three times a day before meals  insulin lispro (HumaLOG) corrective regimen sliding scale   SubCutaneous at bedtime  insulin lispro Injectable (HumaLOG) 3 Unit(s) SubCutaneous three times a day before meals  meclizine 37.5 milliGRAM(s) Oral every 8 hours  pantoprazole    Tablet 40 milliGRAM(s) Oral before breakfast    MEDICATIONS  (PRN):  dextrose 40% Gel 15 Gram(s) Oral once PRN Blood Glucose LESS THAN 70 milliGRAM(s)/deciliter  glucagon  Injectable 1 milliGRAM(s) IntraMuscular once PRN Glucose LESS THAN 70 milligrams/deciliter

## 2019-11-23 NOTE — PROGRESS NOTE ADULT - ASSESSMENT
Pontine CVA  Persistent dizziness  HTN    Will increase Norvasc 5mg QHS and f/u BP  Will adjust Norvasc as needed. Will follow electrolytes and renal function trend.

## 2019-11-24 LAB
GLUCOSE BLDC GLUCOMTR-MCNC: 141 MG/DL — HIGH (ref 70–99)
GLUCOSE BLDC GLUCOMTR-MCNC: 183 MG/DL — HIGH (ref 70–99)
GLUCOSE BLDC GLUCOMTR-MCNC: 204 MG/DL — HIGH (ref 70–99)
GLUCOSE BLDC GLUCOMTR-MCNC: 207 MG/DL — HIGH (ref 70–99)

## 2019-11-24 PROCEDURE — 99232 SBSQ HOSP IP/OBS MODERATE 35: CPT

## 2019-11-24 RX ADMIN — Medication 37.5 MILLIGRAM(S): at 14:21

## 2019-11-24 RX ADMIN — Medication 37.5 MILLIGRAM(S): at 21:27

## 2019-11-24 RX ADMIN — Medication 81 MILLIGRAM(S): at 11:57

## 2019-11-24 RX ADMIN — PANTOPRAZOLE SODIUM 40 MILLIGRAM(S): 20 TABLET, DELAYED RELEASE ORAL at 06:01

## 2019-11-24 RX ADMIN — Medication 37.5 MILLIGRAM(S): at 06:01

## 2019-11-24 RX ADMIN — ENOXAPARIN SODIUM 40 MILLIGRAM(S): 100 INJECTION SUBCUTANEOUS at 11:57

## 2019-11-24 RX ADMIN — INSULIN GLARGINE 8 UNIT(S): 100 INJECTION, SOLUTION SUBCUTANEOUS at 21:26

## 2019-11-24 RX ADMIN — AMLODIPINE BESYLATE 5 MILLIGRAM(S): 2.5 TABLET ORAL at 06:01

## 2019-11-24 RX ADMIN — Medication 2: at 11:28

## 2019-11-24 RX ADMIN — Medication 1: at 16:42

## 2019-11-24 RX ADMIN — ATORVASTATIN CALCIUM 80 MILLIGRAM(S): 80 TABLET, FILM COATED ORAL at 21:27

## 2019-11-24 NOTE — PROGRESS NOTE ADULT - ASSESSMENT
53 yo male s/p right sided cerebellar CVA with unsteady gait and functional deficits admitted to rehab [facility]      #CVA  -continue PT/OT/SLP program, fall precautions.   -CTA head and neck: Diffusely hypoplastic right vertebral artery with calcifications,  - ASA 81mg/Lipitor 80mg. Follow LFTs on high dose statin.       #HTN  143/95 (11/24)  -renal note appreciated appreciated, norvasc increased to 5mg QHS, monitor      #Dizziness w/ vertigo  -meclizine TID. Fall precautions      #DM2  -Humalog, FS ACHS, diabetic education for A1C>9%. Consistent carbs/DASH diet. Evaluated by medicine c plan Lantus. Follow BG for now.       #Otitis Media:  - R ear fullness resolved, denies pain   - Augmentin completed    GI/Bowel Management - prn Miralax  Pain - Tylenol PRN    DVT PPX - Lovenox  Diet - consistent carbs/dash

## 2019-11-24 NOTE — PROGRESS NOTE ADULT - SUBJECTIVE AND OBJECTIVE BOX
HISTORY OF PRESENT ILLNESS  53 yo male with PMHx of HTN presents to [facility] ED with complaint of dizziness/vertigo and unsteady gait. As per pt report intermittent dizziness lasted for 2 days and progressed to constant dizziness/unable to stand without holding unto objects. Also reported two episodes of vomiting days prior, prompting urgent care evaluation. Patient was given Meclizine at the urgent care and was advised ED eval if symptoms persisted. CT head on admission shows no acute hemorrhage or mass effect. 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 Neurology evaluation requested. EKG NSR. ASA 325mg started.   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 TTE WNL. Symptoms continued to worsen in spite of permissive HTN and repeat CT was performed ruling out hemorrhagic conversion. HbA1c 9.5%. .   *Augmentin x7 days with Debrox gtts for Otitis Media. (21 Nov 2019 14:58)      PAST MEDICAL & SURGICAL HISTORY:  HTN (hypertension)  No significant past surgical history       REVIEW OF SYMPTOMS/Subjective: No SOB, no n/v, no pain  [X] Constitutional WNL           [X] Resp WNL           [X] GI WNL                          [X]  WNL                   [ ] Heme WNL                      [ ] Skin WNL                 [X] MSK WNL                      [ ] Cognitive WNL        [ ] Psych WNL      VITALS  Vital Signs Last 24 Hrs  T(C): 36.7 (24 Nov 2019 07:37), Max: 37 (23 Nov 2019 21:19)  T(F): 98 (24 Nov 2019 07:37), Max: 98.6 (23 Nov 2019 21:19)  HR: 73 (24 Nov 2019 07:37) (65 - 87)  BP: 143/95 (24 Nov 2019 07:37) (140/90 - 156/96)  BP(mean): --  RR: 14 (24 Nov 2019 07:37) (14 - 14)  SpO2: 97% (24 Nov 2019 07:37) (96% - 97%)      PHYSICAL EXAM  Constitutional - NAD  HEENT - NCAT  Neck - Supple  Chest - CTA bilaterally, No wheeze  Cardiovascular - RRR, S1S2  Abdomen - BS+, Soft, NTND  Extremities - No C/C/E, No calf tenderness   Wounds-none    Neurologic Exam -Awake, Alert, AAO to self, place, time, situation.  Dysarthria    motor: stable          Psychiatric - Mood stable, Affect WNL       RECENT LABS  LABS:                        16.3   9.43  )-----------( 183      ( 22 Nov 2019 05:35 )             47.6     11-22    141  |  107  |  24<H>  ----------------------------<  124<H>  3.5   |  21<L>  |  1.28    Ca    8.3<L>      22 Nov 2019 05:35    TPro  6.7  /  Alb  2.7<L>  /  TBili  0.9  /  DBili  x   /  AST  22  /  ALT  31  /  AlkPhos  70  11-22      LIVER FUNCTIONS - ( 22 Nov 2019 05:35 )  Alb: 2.7 g/dL / Pro: 6.7 g/dL / ALK PHOS: 70 U/L / ALT: 31 U/L / AST: 22 U/L / GGT: x             Direct LDL: 239 mg/dL (11-15-19 @ 08:31)    Hemoglobin A1C, Whole Blood: 9.5 % (11-15-19 @ 08:34)              RADIOLOGY/OTHER RESULTS      CURRENT MEDICATIONS  MEDICATIONS  (STANDING):  aspirin  chewable 81 milliGRAM(s) Oral daily  atorvastatin 80 milliGRAM(s) Oral at bedtime  dextrose 5%. 1000 milliLiter(s) (50 mL/Hr) IV Continuous <Continuous>  dextrose 50% Injectable 12.5 Gram(s) IV Push once  dextrose 50% Injectable 25 Gram(s) IV Push once  dextrose 50% Injectable 25 Gram(s) IV Push once  enoxaparin Injectable 40 milliGRAM(s) SubCutaneous daily  insulin lispro (HumaLOG) corrective regimen sliding scale   SubCutaneous three times a day before meals  insulin lispro (HumaLOG) corrective regimen sliding scale   SubCutaneous at bedtime  insulin lispro Injectable (HumaLOG) 3 Unit(s) SubCutaneous three times a day before meals  meclizine 37.5 milliGRAM(s) Oral every 8 hours  pantoprazole    Tablet 40 milliGRAM(s) Oral before breakfast    MEDICATIONS  (PRN):  dextrose 40% Gel 15 Gram(s) Oral once PRN Blood Glucose LESS THAN 70 milliGRAM(s)/deciliter  glucagon  Injectable 1 milliGRAM(s) IntraMuscular once PRN Glucose LESS THAN 70 milligrams/deciliter

## 2019-11-25 LAB
ALBUMIN SERPL ELPH-MCNC: 2.9 G/DL — LOW (ref 3.3–5)
ALP SERPL-CCNC: 78 U/L — SIGNIFICANT CHANGE UP (ref 40–120)
ALT FLD-CCNC: 38 U/L — SIGNIFICANT CHANGE UP (ref 10–45)
ANION GAP SERPL CALC-SCNC: 7 MMOL/L — SIGNIFICANT CHANGE UP (ref 5–17)
AST SERPL-CCNC: 15 U/L — SIGNIFICANT CHANGE UP (ref 10–40)
BILIRUB SERPL-MCNC: 0.7 MG/DL — SIGNIFICANT CHANGE UP (ref 0.2–1.2)
BUN SERPL-MCNC: 24 MG/DL — HIGH (ref 7–23)
CALCIUM SERPL-MCNC: 8.6 MG/DL — SIGNIFICANT CHANGE UP (ref 8.4–10.5)
CHLORIDE SERPL-SCNC: 101 MMOL/L — SIGNIFICANT CHANGE UP (ref 96–108)
CO2 SERPL-SCNC: 26 MMOL/L — SIGNIFICANT CHANGE UP (ref 22–31)
CREAT SERPL-MCNC: 1.46 MG/DL — HIGH (ref 0.5–1.3)
GLUCOSE BLDC GLUCOMTR-MCNC: 127 MG/DL — HIGH (ref 70–99)
GLUCOSE BLDC GLUCOMTR-MCNC: 218 MG/DL — HIGH (ref 70–99)
GLUCOSE BLDC GLUCOMTR-MCNC: 233 MG/DL — HIGH (ref 70–99)
GLUCOSE BLDC GLUCOMTR-MCNC: 245 MG/DL — HIGH (ref 70–99)
GLUCOSE SERPL-MCNC: 273 MG/DL — HIGH (ref 70–99)
HCT VFR BLD CALC: 49.5 % — SIGNIFICANT CHANGE UP (ref 39–50)
HGB BLD-MCNC: 17 G/DL — SIGNIFICANT CHANGE UP (ref 13–17)
MCHC RBC-ENTMCNC: 32.3 PG — SIGNIFICANT CHANGE UP (ref 27–34)
MCHC RBC-ENTMCNC: 34.3 GM/DL — SIGNIFICANT CHANGE UP (ref 32–36)
MCV RBC AUTO: 94.1 FL — SIGNIFICANT CHANGE UP (ref 80–100)
NRBC # BLD: 0 /100 WBCS — SIGNIFICANT CHANGE UP (ref 0–0)
PLATELET # BLD AUTO: 196 K/UL — SIGNIFICANT CHANGE UP (ref 150–400)
POTASSIUM SERPL-MCNC: 4.4 MMOL/L — SIGNIFICANT CHANGE UP (ref 3.5–5.3)
POTASSIUM SERPL-SCNC: 4.4 MMOL/L — SIGNIFICANT CHANGE UP (ref 3.5–5.3)
PROT SERPL-MCNC: 7.6 G/DL — SIGNIFICANT CHANGE UP (ref 6–8.3)
RBC # BLD: 5.26 M/UL — SIGNIFICANT CHANGE UP (ref 4.2–5.8)
RBC # FLD: 11.8 % — SIGNIFICANT CHANGE UP (ref 10.3–14.5)
SODIUM SERPL-SCNC: 134 MMOL/L — LOW (ref 135–145)
WBC # BLD: 11.41 K/UL — HIGH (ref 3.8–10.5)
WBC # FLD AUTO: 11.41 K/UL — HIGH (ref 3.8–10.5)

## 2019-11-25 RX ORDER — AMLODIPINE BESYLATE 2.5 MG/1
10 TABLET ORAL AT BEDTIME
Refills: 0 | Status: DISCONTINUED | OUTPATIENT
Start: 2019-11-25 | End: 2019-12-10

## 2019-11-25 RX ORDER — INSULIN GLARGINE 100 [IU]/ML
10 INJECTION, SOLUTION SUBCUTANEOUS AT BEDTIME
Refills: 0 | Status: DISCONTINUED | OUTPATIENT
Start: 2019-11-25 | End: 2019-11-26

## 2019-11-25 RX ADMIN — AMLODIPINE BESYLATE 5 MILLIGRAM(S): 2.5 TABLET ORAL at 05:22

## 2019-11-25 RX ADMIN — INSULIN GLARGINE 10 UNIT(S): 100 INJECTION, SOLUTION SUBCUTANEOUS at 21:14

## 2019-11-25 RX ADMIN — Medication 81 MILLIGRAM(S): at 12:00

## 2019-11-25 RX ADMIN — Medication 37.5 MILLIGRAM(S): at 05:22

## 2019-11-25 RX ADMIN — Medication 2: at 16:55

## 2019-11-25 RX ADMIN — Medication 37.5 MILLIGRAM(S): at 21:14

## 2019-11-25 RX ADMIN — Medication 2: at 11:59

## 2019-11-25 RX ADMIN — AMLODIPINE BESYLATE 10 MILLIGRAM(S): 2.5 TABLET ORAL at 22:22

## 2019-11-25 RX ADMIN — ENOXAPARIN SODIUM 40 MILLIGRAM(S): 100 INJECTION SUBCUTANEOUS at 12:00

## 2019-11-25 RX ADMIN — ATORVASTATIN CALCIUM 80 MILLIGRAM(S): 80 TABLET, FILM COATED ORAL at 21:14

## 2019-11-25 RX ADMIN — Medication 37.5 MILLIGRAM(S): at 14:44

## 2019-11-25 RX ADMIN — PANTOPRAZOLE SODIUM 40 MILLIGRAM(S): 20 TABLET, DELAYED RELEASE ORAL at 07:00

## 2019-11-25 NOTE — PROGRESS NOTE ADULT - SUBJECTIVE AND OBJECTIVE BOX
No distress    Vital Signs Last 24 Hrs  T(C): 36.7 (11-25-19 @ 07:33), Max: 36.8 (11-24-19 @ 21:11)  T(F): 98 (11-25-19 @ 07:33), Max: 98.3 (11-24-19 @ 21:11)  HR: 80 (11-25-19 @ 07:33) (68 - 80)  BP: 147/102 (11-25-19 @ 07:33) (144/94 - 147/102)  RR: 14 (11-25-19 @ 07:33) (14 - 14)  SpO2: 96% (11-25-19 @ 07:33) (96% - 96%)    Card S1S2  Lungs b/l air entry  Abd soft, NT, ND  Extr no edema                        17.0   11.41 )-----------( 196      ( 25 Nov 2019 11:10 )             49.5     25 Nov 2019 11:10    134    |  101    |  24     ----------------------------<  273    4.4     |  26     |  1.46     Ca    8.6        25 Nov 2019 11:10    TPro  7.6    /  Alb  2.9    /  TBili  0.7    /  DBili  x      /  AST  15     /  ALT  38     /  AlkPhos  78     25 Nov 2019 11:10    LIVER FUNCTIONS - ( 25 Nov 2019 11:10 )  Alb: 2.9 g/dL / Pro: 7.6 g/dL / ALK PHOS: 78 U/L / ALT: 38 U/L / AST: 15 U/L / GGT: x           amLODIPine   Tablet 10 milliGRAM(s) Oral at bedtime  aspirin  chewable 81 milliGRAM(s) Oral daily  atorvastatin 80 milliGRAM(s) Oral at bedtime  dextrose 40% Gel 15 Gram(s) Oral once PRN  dextrose 5%. 1000 milliLiter(s) IV Continuous <Continuous>  dextrose 50% Injectable 12.5 Gram(s) IV Push once  dextrose 50% Injectable 25 Gram(s) IV Push once  dextrose 50% Injectable 25 Gram(s) IV Push once  enoxaparin Injectable 40 milliGRAM(s) SubCutaneous daily  glucagon  Injectable 1 milliGRAM(s) IntraMuscular once PRN  insulin glargine Injectable (LANTUS) 10 Unit(s) SubCutaneous at bedtime  insulin lispro (HumaLOG) corrective regimen sliding scale   SubCutaneous three times a day before meals  insulin lispro (HumaLOG) corrective regimen sliding scale   SubCutaneous at bedtime  meclizine 37.5 milliGRAM(s) Oral every 8 hours  pantoprazole    Tablet 40 milliGRAM(s) Oral before breakfast    A/P:    Pontine CVA, dizziness  HTN  Will sincrease Norvasc to 10mg QHS and f/u BP  Will f/u BMP  Encourage PO fluids

## 2019-11-25 NOTE — PROGRESS NOTE ADULT - ASSESSMENT
55 yo male s/p right sided cerebellar CVA with unsteady gait and functional deficits admitted to rehab [facility]      #CVA  -on PT/OT/SLP program, fall precautions. Lovenox for DVT ppx.   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   - ASA 81mg/Lipitor 80mg. Follow LFTs on high dose statin.       #HTN  -renal evaluation completed, Norvasc. BPs elevated in last 24h, will ask renal to follow up.        #Dizziness w/ vertigo  -meclizine TID. Fall precautions. Still dizzy.       #DM2  -Humalog, FS ACHS, diabetic education for A1C>9%. Consistent carbs/DASH diet. Evaluated by medicine c plan Lantus. Follow BG for now.       #Otitis Media:  - R ear fullness resolved, denies pain   - Augmentin completed 55 yo male s/p right sided cerebellar CVA with unsteady gait and functional deficits admitted to rehab [facility]      #CVA  -on PT/OT/SLP program, fall precautions. Lovenox for DVT ppx.   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   - ASA 81mg/Lipitor 80mg. Follow LFTs on high dose statin.       #HTN  -renal evaluation completed, Norvasc. BPs elevated in last 24h, will ask renal to follow up.        #Dizziness w/ vertigo  -meclizine TID. Fall precautions. Still dizzy.       #DM2  -Humalog, FS ACHS, diabetic education for A1C>9%. Consistent carbs/DASH diet. Evaluated by medicine c plan Lantus. Follow BG for now.       #Otitis Media:  - R ear fullness resolved, denies pain   - Augmentin completed    #YANETH  -elevated creatinine, hemoconcentration? prerenal. BMP am. Renal requested for input. IVF?

## 2019-11-25 NOTE — PROGRESS NOTE ADULT - SUBJECTIVE AND OBJECTIVE BOX
CHIEF COMPLAINT: Dizziness, room spinning.       HISTORY OF PRESENT ILLNESS  53 yo male with PMHx of HTN presents to \[facility]\"" ED with complaint of dizziness/vertigo and unsteady gait. As per pt report intermittent dizziness lasted for 2 days and progressed to constant dizziness/unable to stand without holding unto objects. Also reported two episodes of vomiting days prior, prompting urgent care evaluation. Patient was given Meclizine at the urgent care and was advised ED eval if symptoms persisted. CT head on admission shows no acute hemorrhage or mass effect. 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 Neurology evaluation requested. EKG NSR. ASA 325mg started.   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 TTE WNL. Symptoms continued to worsen in spite of permissive HTN and repeat CT was performed ruling out hemorrhagic conversion. HbA1c 9.5%. .   *Augmentin x7 days with Debrox gtts for Otitis Media. (21 Nov 2019 14:58)      PAST MEDICAL & SURGICAL HISTORY:  HTN (hypertension)  No significant past surgical history       REVIEW OF SYMPTOMS  [X] Constitutional WNL          [X] Resp WNL           [X] GI WNL                          [X]  WNL           [X] Heme WNL              [X] Skin WNL                  [X] MSK WNL                 [X] Psych WNL      VITALS  Vital Signs Last 24 Hrs  T(C): 36.7 (25 Nov 2019 07:33), Max: 36.8 (24 Nov 2019 21:11)  T(F): 98 (25 Nov 2019 07:33), Max: 98.3 (24 Nov 2019 21:11)  HR: 80 (25 Nov 2019 07:33) (68 - 80)  BP: 147/102 (25 Nov 2019 07:33) (144/94 - 147/102)  BP(mean): --  RR: 14 (25 Nov 2019 07:33) (14 - 14)  SpO2: 96% (25 Nov 2019 07:33) (96% - 96%)    PHYSICAL EXAM  Constitutional - NAD  HEENT - NCAT, EOMI  Neck - Supple, No limited ROM  Chest - CTA bilaterally, No wheeze, No rhonchi, No crackles  Cardiovascular - RRR, S1S2, No murmurs  Abdomen - BS+, Soft, NTND  Extremities - No C/C/E, No calf tenderness   Skin-no rash  Wounds-none      Neurologic Exam -Awake, Alert, AAO to self, place, time, situation. Dysarthria/hesistancy. Dysmetria/incoordination/poor balance.       Cranial Nerves - Nystagmus.     Sensory-impaired LT on right     Psychiatric - Mood stable, Affect WNL      FUNCTIONAL PROGRESS  Gait - 60ft min/mod A  ADLs - min A  Transfers - min a/mod A  Functional transfer - min A    RECENT LABS                Direct LDL: 239 mg/dL (11-15-19 @ 08:31)    Hemoglobin A1C, Whole Blood: 9.5 % (11-15-19 @ 08:34)              RADIOLOGY/OTHER RESULTS      CURRENT MEDICATIONS  MEDICATIONS  (STANDING):  amLODIPine   Tablet 5 milliGRAM(s) Oral daily  aspirin  chewable 81 milliGRAM(s) Oral daily  atorvastatin 80 milliGRAM(s) Oral at bedtime  dextrose 5%. 1000 milliLiter(s) (50 mL/Hr) IV Continuous <Continuous>  dextrose 50% Injectable 12.5 Gram(s) IV Push once  dextrose 50% Injectable 25 Gram(s) IV Push once  dextrose 50% Injectable 25 Gram(s) IV Push once  enoxaparin Injectable 40 milliGRAM(s) SubCutaneous daily  insulin glargine Injectable (LANTUS) 10 Unit(s) SubCutaneous at bedtime  insulin lispro (HumaLOG) corrective regimen sliding scale   SubCutaneous three times a day before meals  insulin lispro (HumaLOG) corrective regimen sliding scale   SubCutaneous at bedtime  meclizine 37.5 milliGRAM(s) Oral every 8 hours  pantoprazole    Tablet 40 milliGRAM(s) Oral before breakfast    MEDICATIONS  (PRN):  dextrose 40% Gel 15 Gram(s) Oral once PRN Blood Glucose LESS THAN 70 milliGRAM(s)/deciliter  glucagon  Injectable 1 milliGRAM(s) IntraMuscular once PRN Glucose LESS THAN 70 milligrams/deciliter      ASSESSMENT & PLAN    GI/Bowel Management - prn Miralax   Management - Toilet Q2  Skin - Turn Q2  Pain - Tylenol PRN  DVT PPX - Lovenox  Diet - consistent carbs/dash      Continue comprehensive acute rehab program consisting of 3hrs/day of OT/PT and SLP.

## 2019-11-26 LAB
ALBUMIN SERPL ELPH-MCNC: 2.7 G/DL — LOW (ref 3.3–5)
ALP SERPL-CCNC: 78 U/L — SIGNIFICANT CHANGE UP (ref 40–120)
ALT FLD-CCNC: 31 U/L — SIGNIFICANT CHANGE UP (ref 10–45)
ANION GAP SERPL CALC-SCNC: 11 MMOL/L — SIGNIFICANT CHANGE UP (ref 5–17)
APPEARANCE UR: CLEAR — SIGNIFICANT CHANGE UP
AST SERPL-CCNC: 15 U/L — SIGNIFICANT CHANGE UP (ref 10–40)
BACTERIA # UR AUTO: NEGATIVE /HPF — SIGNIFICANT CHANGE UP
BILIRUB SERPL-MCNC: 0.8 MG/DL — SIGNIFICANT CHANGE UP (ref 0.2–1.2)
BILIRUB UR-MCNC: NEGATIVE — SIGNIFICANT CHANGE UP
BUN SERPL-MCNC: 22 MG/DL — SIGNIFICANT CHANGE UP (ref 7–23)
CALCIUM SERPL-MCNC: 8.4 MG/DL — SIGNIFICANT CHANGE UP (ref 8.4–10.5)
CHLORIDE SERPL-SCNC: 103 MMOL/L — SIGNIFICANT CHANGE UP (ref 96–108)
CO2 SERPL-SCNC: 23 MMOL/L — SIGNIFICANT CHANGE UP (ref 22–31)
COLOR SPEC: YELLOW — SIGNIFICANT CHANGE UP
COMMENT - URINE: SIGNIFICANT CHANGE UP
CREAT SERPL-MCNC: 1.24 MG/DL — SIGNIFICANT CHANGE UP (ref 0.5–1.3)
DIFF PNL FLD: NEGATIVE — SIGNIFICANT CHANGE UP
EPI CELLS # UR: SIGNIFICANT CHANGE UP
GLUCOSE BLDC GLUCOMTR-MCNC: 167 MG/DL — HIGH (ref 70–99)
GLUCOSE BLDC GLUCOMTR-MCNC: 201 MG/DL — HIGH (ref 70–99)
GLUCOSE BLDC GLUCOMTR-MCNC: 204 MG/DL — HIGH (ref 70–99)
GLUCOSE BLDC GLUCOMTR-MCNC: 226 MG/DL — HIGH (ref 70–99)
GLUCOSE SERPL-MCNC: 158 MG/DL — HIGH (ref 70–99)
GLUCOSE UR QL: 250
HCT VFR BLD CALC: 47.4 % — SIGNIFICANT CHANGE UP (ref 39–50)
HGB BLD-MCNC: 16.5 G/DL — SIGNIFICANT CHANGE UP (ref 13–17)
HYALINE CASTS # UR AUTO: ABNORMAL (ref 0–7)
KETONES UR-MCNC: NEGATIVE — SIGNIFICANT CHANGE UP
LEUKOCYTE ESTERASE UR-ACNC: NEGATIVE — SIGNIFICANT CHANGE UP
MCHC RBC-ENTMCNC: 32.7 PG — SIGNIFICANT CHANGE UP (ref 27–34)
MCHC RBC-ENTMCNC: 34.8 GM/DL — SIGNIFICANT CHANGE UP (ref 32–36)
MCV RBC AUTO: 93.9 FL — SIGNIFICANT CHANGE UP (ref 80–100)
NITRITE UR-MCNC: NEGATIVE — SIGNIFICANT CHANGE UP
NRBC # BLD: 0 /100 WBCS — SIGNIFICANT CHANGE UP (ref 0–0)
PH UR: 5 — SIGNIFICANT CHANGE UP (ref 5–8)
PLATELET # BLD AUTO: 196 K/UL — SIGNIFICANT CHANGE UP (ref 150–400)
POTASSIUM SERPL-MCNC: 4 MMOL/L — SIGNIFICANT CHANGE UP (ref 3.5–5.3)
POTASSIUM SERPL-SCNC: 4 MMOL/L — SIGNIFICANT CHANGE UP (ref 3.5–5.3)
PROT SERPL-MCNC: 7.2 G/DL — SIGNIFICANT CHANGE UP (ref 6–8.3)
PROT UR-MCNC: 100
RBC # BLD: 5.05 M/UL — SIGNIFICANT CHANGE UP (ref 4.2–5.8)
RBC # FLD: 11.7 % — SIGNIFICANT CHANGE UP (ref 10.3–14.5)
RBC CASTS # UR COMP ASSIST: NEGATIVE /HPF — SIGNIFICANT CHANGE UP (ref 0–4)
SODIUM SERPL-SCNC: 137 MMOL/L — SIGNIFICANT CHANGE UP (ref 135–145)
SP GR SPEC: 1.02 — SIGNIFICANT CHANGE UP (ref 1.01–1.02)
URATE CRY FLD QL MICRO: ABNORMAL
UROBILINOGEN FLD QL: NEGATIVE — SIGNIFICANT CHANGE UP
WBC # BLD: 10.77 K/UL — HIGH (ref 3.8–10.5)
WBC # FLD AUTO: 10.77 K/UL — HIGH (ref 3.8–10.5)
WBC UR QL: NEGATIVE /HPF — SIGNIFICANT CHANGE UP (ref 0–5)

## 2019-11-26 PROCEDURE — 99232 SBSQ HOSP IP/OBS MODERATE 35: CPT

## 2019-11-26 RX ORDER — INSULIN GLARGINE 100 [IU]/ML
14 INJECTION, SOLUTION SUBCUTANEOUS AT BEDTIME
Refills: 0 | Status: DISCONTINUED | OUTPATIENT
Start: 2019-11-26 | End: 2019-11-27

## 2019-11-26 RX ADMIN — Medication 2: at 16:55

## 2019-11-26 RX ADMIN — ATORVASTATIN CALCIUM 80 MILLIGRAM(S): 80 TABLET, FILM COATED ORAL at 21:29

## 2019-11-26 RX ADMIN — Medication 2: at 11:54

## 2019-11-26 RX ADMIN — Medication 81 MILLIGRAM(S): at 11:55

## 2019-11-26 RX ADMIN — AMLODIPINE BESYLATE 10 MILLIGRAM(S): 2.5 TABLET ORAL at 21:30

## 2019-11-26 RX ADMIN — Medication 37.5 MILLIGRAM(S): at 21:30

## 2019-11-26 RX ADMIN — INSULIN GLARGINE 14 UNIT(S): 100 INJECTION, SOLUTION SUBCUTANEOUS at 21:30

## 2019-11-26 RX ADMIN — Medication 37.5 MILLIGRAM(S): at 14:31

## 2019-11-26 RX ADMIN — PANTOPRAZOLE SODIUM 40 MILLIGRAM(S): 20 TABLET, DELAYED RELEASE ORAL at 06:31

## 2019-11-26 RX ADMIN — Medication 1: at 08:09

## 2019-11-26 RX ADMIN — Medication 37.5 MILLIGRAM(S): at 06:31

## 2019-11-26 RX ADMIN — ENOXAPARIN SODIUM 40 MILLIGRAM(S): 100 INJECTION SUBCUTANEOUS at 11:55

## 2019-11-26 NOTE — PROGRESS NOTE ADULT - SUBJECTIVE AND OBJECTIVE BOX
Rehab note    CHIEF COMPLAINT: Dizziness, unsteady gait.       HISTORY OF PRESENT ILLNESS  55 yo male with PMHx of HTN presents to [facility] ED with complaint of dizziness/vertigo and unsteady gait. As per pt report intermittent dizziness lasted for 2 days and progressed to constant dizziness/unable to stand without holding unto objects. Also reported two episodes of vomiting days prior, prompting urgent care evaluation. Patient was given Meclizine at the urgent care and was advised ED eval if symptoms persisted. CT head on admission shows no acute hemorrhage or mass effect. 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 Neurology evaluation requested. EKG NSR. ASA 325mg started.   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 TTE WNL. Symptoms continued to worsen in spite of permissive HTN and repeat CT was performed ruling out hemorrhagic conversion. HbA1c 9.5%. .   *Augmentin x7 days with Debrox gtts for Otitis Media. (2019 14:58)      PAST MEDICAL & SURGICAL HISTORY:  HTN (hypertension)  No significant past surgical history        REVIEW OF SYMPTOMS  [X] Constitutional WNL          [X] Resp WNL           [X] GI WNL                          [X]  WNL                   [X] Heme WNL              [X] Endo WNL                      [X] Skin WNL                 [X] MSK WNL              [X] Psych WNL      VITALS  Vital Signs Last 24 Hrs  T(C): 36.6 (2019 21:05), Max: 36.6 (2019 21:05)  T(F): 97.9 (2019 21:05), Max: 97.9 (2019 21:05)  HR: 88 (2019 06:29) (88 - 88)  BP: 134/84 (2019 06:29) (134/84 - 153/97)  BP(mean): --  RR: 97 (2019 21:05) (97 - 97)  SpO2: --      PHYSICAL EXAM  Constitutional - NAD, Comfortable  HEENT - NCAT, EOMI  Neck - Supple, No limited ROM  Chest - CTA bilaterally, No wheeze, No rhonchi, No crackles  Cardiovascular - RRR, S1S2, No murmurs  Abdomen - BS+, Soft, NTND  Extremities - No C/C/E, No calf tenderness   Skin-no rash  Woumds-      Neurologic Exam -                   HIF  - Awake, Alert, AAO to self, place, date, year, situation      No aphasia, normal attention, normal concentration, no apraxia, agnosia     Cranial Nerves - CN 2-12 intact     Motor - No focal deficits                            Sensory - Intact to LT,PP,Temprature,JPS, vibration                      Cortical sensory modalities intact     Reflexes - DTR Intact     Toes are flexor     Coordination/dysmetria - FTN intact             Balance - WNL Static and dynamic     Psychiatric - Mood stable, Affect WNL         FUNCTIONAL PROGRESS  Gait -   ADLs -   Transfers -  Functional transfer -     RECENT LABS                        16.5   10.77 )-----------( 196      ( 2019 06:15 )             47.4         137  |  103  |  22  ----------------------------<  158<H>  4.0   |  23  |  1.24    Ca    8.4      2019 06:15    TPro  7.2  /  Alb  2.7<L>  /  TBili  0.8  /  DBili  x   /  AST  15  /  ALT  31  /  AlkPhos  78        LIVER FUNCTIONS - ( 2019 06:15 )  Alb: 2.7 g/dL / Pro: 7.2 g/dL / ALK PHOS: 78 U/L / ALT: 31 U/L / AST: 15 U/L / GGT: x           Urinalysis Basic - ( 2019 08:00 )    Color: Yellow / Appearance: Clear / S.020 / pH: x  Gluc: x / Ketone: Negative  / Bili: Negative / Urobili: Negative   Blood: x / Protein: 100 / Nitrite: Negative   Leuk Esterase: Negative / RBC: Negative /HPF / WBC Negative /HPF   Sq Epi: x / Non Sq Epi: Neg.-Few / Bacteria: Negative /HPF      Direct LDL: 239 mg/dL (11-15-19 @ 08:31)    Hemoglobin A1C, Whole Blood: 9.5 % (11-15-19 @ 08:34)              RADIOLOGY/OTHER RESULTS      CURRENT MEDICATIONS  MEDICATIONS  (STANDING):  amLODIPine   Tablet 10 milliGRAM(s) Oral at bedtime  aspirin  chewable 81 milliGRAM(s) Oral daily  atorvastatin 80 milliGRAM(s) Oral at bedtime  dextrose 5%. 1000 milliLiter(s) (50 mL/Hr) IV Continuous <Continuous>  dextrose 50% Injectable 12.5 Gram(s) IV Push once  dextrose 50% Injectable 25 Gram(s) IV Push once  dextrose 50% Injectable 25 Gram(s) IV Push once  enoxaparin Injectable 40 milliGRAM(s) SubCutaneous daily  insulin glargine Injectable (LANTUS) 14 Unit(s) SubCutaneous at bedtime  insulin lispro (HumaLOG) corrective regimen sliding scale   SubCutaneous three times a day before meals  insulin lispro (HumaLOG) corrective regimen sliding scale   SubCutaneous at bedtime  meclizine 37.5 milliGRAM(s) Oral every 8 hours  pantoprazole    Tablet 40 milliGRAM(s) Oral before breakfast    MEDICATIONS  (PRN):  dextrose 40% Gel 15 Gram(s) Oral once PRN Blood Glucose LESS THAN 70 milliGRAM(s)/deciliter  glucagon  Injectable 1 milliGRAM(s) IntraMuscular once PRN Glucose LESS THAN 70 milligrams/deciliter      ASSESSMENT & PLAN          GI/Bowel Management - Dulcolax PRN, Fleet PRN   Management - Toilet Q2  Skin - Turn Q2  Pain - Tylenol PRN  DVT PPX - TEDs, SCDs  Diet -     Continue comprehensive acute rehab program consisting of 3hrs/day of OT/PT and SLP. Rehab note    CHIEF COMPLAINT: Dizziness, unsteady gait.       HISTORY OF PRESENT ILLNESS  55 yo male with PMHx of HTN presents to [facility] ED with complaint of dizziness/vertigo and unsteady gait. As per pt report intermittent dizziness lasted for 2 days and progressed to constant dizziness/unable to stand without holding unto objects. Also reported two episodes of vomiting days prior, prompting urgent care evaluation. Patient was given Meclizine at the urgent care and was advised ED eval if symptoms persisted. CT head on admission shows no acute hemorrhage or mass effect. 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 Neurology evaluation requested. EKG NSR. ASA 325mg started.   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 TTE WNL. Symptoms continued to worsen in spite of permissive HTN and repeat CT was performed ruling out hemorrhagic conversion. HbA1c 9.5%. .   *Augmentin x7 days with Debrox gtts for Otitis Media. (2019 14:58)      PAST MEDICAL & SURGICAL HISTORY:  HTN (hypertension)  No significant past surgical history        REVIEW OF SYMPTOMS  [X] Constitutional WNL          [X] Resp WNL           [X] GI WNL                               [X] Skin WNL                 [X] MSK WNL              [X] Psych WNL      VITALS  Vital Signs Last 24 Hrs  T(C): 36.6 (2019 21:05), Max: 36.6 (2019 21:05)  T(F): 97.9 (2019 21:05), Max: 97.9 (2019 21:05)  HR: 88 (2019 06:29) (88 - 88)  BP: 134/84 (2019 06:29) (134/84 - 153/97)  BP(mean): --  RR: 97 (2019 21:05) (97 - 97)  SpO2: --      PHYSICAL EXAM  Constitutional - NAD  HEENT - NCAT, EOMI  Neck - Supple, No limited ROM  Chest - CTA bilaterally, No wheeze, No rhonchi, No crackles  Cardiovascular - RRR, S1S2, No murmurs  Abdomen - BS+, Soft, NTND  Extremities - No C/C/E, No calf tenderness   Skin-no rash  Wounds-none      Neurologic Exam -Awake, Alert, AAO to self, place, time, situation. Dysarthria/hesistancy. Dysmetria/incoordination/poor balance.       Cranial Nerves - Nystagmus.     Sensory-impaired LT on right     Psychiatric - Mood stable, Affect WNL      FUNCTIONAL PROGRESS  Gait - 60ft min/mod A  ADLs - min A  Transfers - min a/mod A  Functional transfer - min A    RECENT LABS                        16.5   10.77 )-----------( 196      ( 2019 06:15 )             47.4         137  |  103  |  22  ----------------------------<  158<H>  4.0   |  23  |  1.24    Ca    8.4      2019 06:15    TPro  7.2  /  Alb  2.7<L>  /  TBili  0.8  /  DBili  x   /  AST  15  /  ALT  31  /  AlkPhos  78        LIVER FUNCTIONS - ( 2019 06:15 )  Alb: 2.7 g/dL / Pro: 7.2 g/dL / ALK PHOS: 78 U/L / ALT: 31 U/L / AST: 15 U/L / GGT: x           Urinalysis Basic - ( 2019 08:00 )    Color: Yellow / Appearance: Clear / S.020 / pH: x  Gluc: x / Ketone: Negative  / Bili: Negative / Urobili: Negative   Blood: x / Protein: 100 / Nitrite: Negative   Leuk Esterase: Negative / RBC: Negative /HPF / WBC Negative /HPF   Sq Epi: x / Non Sq Epi: Neg.-Few / Bacteria: Negative /HPF      Direct LDL: 239 mg/dL (11-15-19 @ 08:31)    Hemoglobin A1C, Whole Blood: 9.5 % (11-15-19 @ 08:34)              RADIOLOGY/OTHER RESULTS      CURRENT MEDICATIONS  MEDICATIONS  (STANDING):  amLODIPine   Tablet 10 milliGRAM(s) Oral at bedtime  aspirin  chewable 81 milliGRAM(s) Oral daily  atorvastatin 80 milliGRAM(s) Oral at bedtime  dextrose 5%. 1000 milliLiter(s) (50 mL/Hr) IV Continuous <Continuous>  dextrose 50% Injectable 12.5 Gram(s) IV Push once  dextrose 50% Injectable 25 Gram(s) IV Push once  dextrose 50% Injectable 25 Gram(s) IV Push once  enoxaparin Injectable 40 milliGRAM(s) SubCutaneous daily  insulin glargine Injectable (LANTUS) 14 Unit(s) SubCutaneous at bedtime  insulin lispro (HumaLOG) corrective regimen sliding scale   SubCutaneous three times a day before meals  insulin lispro (HumaLOG) corrective regimen sliding scale   SubCutaneous at bedtime  meclizine 37.5 milliGRAM(s) Oral every 8 hours  pantoprazole    Tablet 40 milliGRAM(s) Oral before breakfast    MEDICATIONS  (PRN):  dextrose 40% Gel 15 Gram(s) Oral once PRN Blood Glucose LESS THAN 70 milliGRAM(s)/deciliter  glucagon  Injectable 1 milliGRAM(s) IntraMuscular once PRN Glucose LESS THAN 70 milligrams/deciliter      ASSESSMENT & PLAN    GI/Bowel Management - prn Miralax   Management - Toilet Q2  Skin - Turn Q2  Pain - Tylenol PRN  DVT PPX - Lovenox  Diet - consistent carbs/dash    Continue comprehensive acute rehab program consisting of 3hrs/day of OT/PT and SLP.

## 2019-11-26 NOTE — PROGRESS NOTE ADULT - ASSESSMENT
53 yo male s/p right sided cerebellar CVA with unsteady gait and functional deficits admitted to rehab [facility]    #CVA  -on PT/OT/SLP program, fall precautions. Lovenox for DVT ppx.   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   -ASA 81mg/Lipitor 80mg. Follow LFTs on high dose statin.     #HTN  -renal evaluation completed, Norvasc increased.     #Dizziness w/ vertigo  -meclizine TID. Fall precautions. Still dizzy.     #DM2  -Humalog, FS ACHS, diabetic education for A1C>9%. Consistent carbs/DASH diet. Evaluated by medicine c plan Lantus. Follow BG-Lantus increased.      #Otitis Media:  - Right ear fullness resolved, denies pain   - Augmentin completed    #YANETH  -elevated creatinine, PO fluids. Renal in. Labs am. Check UA.

## 2019-11-26 NOTE — PROGRESS NOTE ADULT - SUBJECTIVE AND OBJECTIVE BOX
No distress    Vital Signs Last 24 Hrs  T(C): 36.6 (19 @ 21:05), Max: 36.6 (19 @ 21:05)  T(F): 97.9 (19 @ 21:05), Max: 97.9 (19 @ 21:05)  HR: 88 (19 @ 06:29) (88 - 88)  BP: 134/84 (19 @ 06:29) (134/84 - 153/97)  RR: 97 (19 @ 21:05) (97 - 97)    Card S1S2  Lungs b/l air entry  Abd soft, NT, ND  Extr no edema                                16.5   10.77 )-----------( 196      ( 2019 06:15 )             47.4     2019 06:15    137    |  103    |  22     ----------------------------<  158    4.0     |  23     |  1.24     Ca    8.4        2019 06:15    TPro  7.2    /  Alb  2.7    /  TBili  0.8    /  DBili  x      /  AST  15     /  ALT  31     /  AlkPhos  78     2019 06:15    LIVER FUNCTIONS - ( 2019 06:15 )  Alb: 2.7 g/dL / Pro: 7.2 g/dL / ALK PHOS: 78 U/L / ALT: 31 U/L / AST: 15 U/L / GGT: x           Urinalysis Basic - ( 2019 08:00 )    Color: Yellow / Appearance: Clear / S.020 / pH: x  Gluc: x / Ketone: Negative  / Bili: Negative / Urobili: Negative   Blood: x / Protein: 100 / Nitrite: Negative   Leuk Esterase: Negative / RBC: Negative /HPF / WBC Negative /HPF   Sq Epi: x / Non Sq Epi: Neg.-Few / Bacteria: Negative /HPF    amLODIPine   Tablet 10 milliGRAM(s) Oral at bedtime  aspirin  chewable 81 milliGRAM(s) Oral daily  atorvastatin 80 milliGRAM(s) Oral at bedtime  dextrose 40% Gel 15 Gram(s) Oral once PRN  dextrose 5%. 1000 milliLiter(s) IV Continuous <Continuous>  dextrose 50% Injectable 12.5 Gram(s) IV Push once  dextrose 50% Injectable 25 Gram(s) IV Push once  dextrose 50% Injectable 25 Gram(s) IV Push once  enoxaparin Injectable 40 milliGRAM(s) SubCutaneous daily  glucagon  Injectable 1 milliGRAM(s) IntraMuscular once PRN  insulin glargine Injectable (LANTUS) 14 Unit(s) SubCutaneous at bedtime  insulin lispro (HumaLOG) corrective regimen sliding scale   SubCutaneous three times a day before meals  insulin lispro (HumaLOG) corrective regimen sliding scale   SubCutaneous at bedtime  meclizine 37.5 milliGRAM(s) Oral every 8 hours  pantoprazole    Tablet 40 milliGRAM(s) Oral before breakfast    A/P:    Pontine CVA, dizziness  HTN  Will f/u BP on Norvasc 10mg QHS   Cr better  Encourage PO fluids

## 2019-11-27 LAB
GLUCOSE BLDC GLUCOMTR-MCNC: 172 MG/DL — HIGH (ref 70–99)
GLUCOSE BLDC GLUCOMTR-MCNC: 230 MG/DL — HIGH (ref 70–99)
GLUCOSE BLDC GLUCOMTR-MCNC: 243 MG/DL — HIGH (ref 70–99)
GLUCOSE BLDC GLUCOMTR-MCNC: 253 MG/DL — HIGH (ref 70–99)

## 2019-11-27 PROCEDURE — 99232 SBSQ HOSP IP/OBS MODERATE 35: CPT

## 2019-11-27 RX ORDER — INSULIN GLARGINE 100 [IU]/ML
18 INJECTION, SOLUTION SUBCUTANEOUS AT BEDTIME
Refills: 0 | Status: DISCONTINUED | OUTPATIENT
Start: 2019-11-27 | End: 2019-12-02

## 2019-11-27 RX ADMIN — Medication 1: at 07:38

## 2019-11-27 RX ADMIN — AMLODIPINE BESYLATE 10 MILLIGRAM(S): 2.5 TABLET ORAL at 21:28

## 2019-11-27 RX ADMIN — Medication 3: at 12:22

## 2019-11-27 RX ADMIN — Medication 2: at 16:48

## 2019-11-27 RX ADMIN — ENOXAPARIN SODIUM 40 MILLIGRAM(S): 100 INJECTION SUBCUTANEOUS at 12:22

## 2019-11-27 RX ADMIN — Medication 37.5 MILLIGRAM(S): at 05:23

## 2019-11-27 RX ADMIN — Medication 37.5 MILLIGRAM(S): at 14:53

## 2019-11-27 RX ADMIN — Medication 81 MILLIGRAM(S): at 12:21

## 2019-11-27 RX ADMIN — INSULIN GLARGINE 18 UNIT(S): 100 INJECTION, SOLUTION SUBCUTANEOUS at 21:29

## 2019-11-27 RX ADMIN — PANTOPRAZOLE SODIUM 40 MILLIGRAM(S): 20 TABLET, DELAYED RELEASE ORAL at 05:23

## 2019-11-27 RX ADMIN — ATORVASTATIN CALCIUM 80 MILLIGRAM(S): 80 TABLET, FILM COATED ORAL at 21:28

## 2019-11-27 RX ADMIN — Medication 37.5 MILLIGRAM(S): at 21:29

## 2019-11-27 NOTE — PROGRESS NOTE ADULT - SUBJECTIVE AND OBJECTIVE BOX
Rehab progress    CHIEF COMPLAINT: Dizziness, unsteady gait.       HISTORY OF PRESENT ILLNESS  53 yo male with PMHx of HTN presents to [facility] ED with complaint of dizziness/vertigo and unsteady gait. As per pt report intermittent dizziness lasted for 2 days and progressed to constant dizziness/unable to stand without holding unto objects. Also reported two episodes of vomiting days prior, prompting urgent care evaluation. Patient was given Meclizine at the urgent care and was advised ED eval if symptoms persisted. CT head on admission shows no acute hemorrhage or mass effect. 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 Neurology evaluation requested. EKG NSR. ASA 325mg started.   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 TTE WNL. Symptoms continued to worsen in spite of permissive HTN and repeat CT was performed ruling out hemorrhagic conversion. HbA1c 9.5%. .   *Augmentin x7 days with Debrox gtts for Otitis Media. (2019 14:58)      PAST MEDICAL & SURGICAL HISTORY:  HTN (hypertension)  No significant past surgical history       REVIEW OF SYMPTOMS  [X] Constitutional WNL          [X] Resp WNL           [X] GI WNL                          [X]  WNL                   [X] Heme WNL                         [X] Skin WNL                 [X] MSK WNL                 [X] Psych WNL      VITALS  Vital Signs Last 24 Hrs  T(C): 36.6 (2019 07:34), Max: 36.8 (2019 19:39)  T(F): 97.9 (2019 07:34), Max: 98.3 (2019 19:39)  HR: 86 (2019 07:34) (86 - 90)  BP: 137/90 (2019 07:34) (119/82 - 144/98)  BP(mean): --  RR: 15 (2019 07:34) (14 - 15)  SpO2: 94% (2019 07:34) (94% - 96%)    PHYSICAL EXAM  Constitutional - NAD  HEENT - NCAT, EOMI  Neck - Supple, No limited ROM  Chest - CTA bilaterally, No wheeze, No rhonchi, No crackles  Cardiovascular - RRR, S1S2, No murmurs  Abdomen - BS+, Soft, NTND  Extremities - No C/C/E, No calf tenderness   Skin-no rash  Wounds-none      Neurologic Exam -Awake, Alert, AAO to self, place, time, situation. Dysarthria/hesistancy. Dysmetria/incoordination/poor balance.       Cranial Nerves - Nystagmus.     Sensory-impaired LT on right     Psychiatric - Mood stable, Affect WNL      FUNCTIONAL PROGRESS  Gait - 60ft min/mod A  ADLs - min A  Transfers - min a/mod A  Functional transfer - min A      RECENT LABS                        16.5   10.77 )-----------( 196      ( 2019 06:15 )             47.4         137  |  103  |  22  ----------------------------<  158<H>  4.0   |  23  |  1.24    Ca    8.4      2019 06:15    TPro  7.2  /  Alb  2.7<L>  /  TBili  0.8  /  DBili  x   /  AST  15  /  ALT  31  /  AlkPhos  78        LIVER FUNCTIONS - ( 2019 06:15 )  Alb: 2.7 g/dL / Pro: 7.2 g/dL / ALK PHOS: 78 U/L / ALT: 31 U/L / AST: 15 U/L / GGT: x           Urinalysis Basic - ( 2019 08:00 )    Color: Yellow / Appearance: Clear / S.020 / pH: x  Gluc: x / Ketone: Negative  / Bili: Negative / Urobili: Negative   Blood: x / Protein: 100 / Nitrite: Negative   Leuk Esterase: Negative / RBC: Negative /HPF / WBC Negative /HPF   Sq Epi: x / Non Sq Epi: Neg.-Few / Bacteria: Negative /HPF      Direct LDL: 239 mg/dL (11-15-19 @ 08:31)    Hemoglobin A1C, Whole Blood: 9.5 % (11-15-19 @ 08:34)              RADIOLOGY/OTHER RESULTS      CURRENT MEDICATIONS  MEDICATIONS  (STANDING):  amLODIPine   Tablet 10 milliGRAM(s) Oral at bedtime  aspirin  chewable 81 milliGRAM(s) Oral daily  atorvastatin 80 milliGRAM(s) Oral at bedtime  dextrose 5%. 1000 milliLiter(s) (50 mL/Hr) IV Continuous <Continuous>  dextrose 50% Injectable 12.5 Gram(s) IV Push once  dextrose 50% Injectable 25 Gram(s) IV Push once  dextrose 50% Injectable 25 Gram(s) IV Push once  enoxaparin Injectable 40 milliGRAM(s) SubCutaneous daily  insulin glargine Injectable (LANTUS) 14 Unit(s) SubCutaneous at bedtime  insulin lispro (HumaLOG) corrective regimen sliding scale   SubCutaneous three times a day before meals  insulin lispro (HumaLOG) corrective regimen sliding scale   SubCutaneous at bedtime  meclizine 37.5 milliGRAM(s) Oral every 8 hours  pantoprazole    Tablet 40 milliGRAM(s) Oral before breakfast    MEDICATIONS  (PRN):  dextrose 40% Gel 15 Gram(s) Oral once PRN Blood Glucose LESS THAN 70 milliGRAM(s)/deciliter  glucagon  Injectable 1 milliGRAM(s) IntraMuscular once PRN Glucose LESS THAN 70 milligrams/deciliter      ASSESSMENT & PLAN      GI/Bowel Management - prn Miralax   Management - Toilet Q2  Skin - Turn Q2  Pain - Tylenol PRN  DVT PPX - Lovenox  Diet - consistent carbs/dash      Continue comprehensive acute rehab program consisting of 3hrs/day of OT/PT and SLP.

## 2019-11-27 NOTE — PROGRESS NOTE ADULT - ASSESSMENT
53 yo male s/p right sided cerebellar CVA with unsteady gait and functional deficits admitted to rehab [facility]    #CVA  -on PT/OT/SLP program, fall precautions. Lovenox for DVT ppx.   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   -ASA 81mg/Lipitor 80mg. Follow LFTs on high dose statin.     #HTN  -renal evaluation completed, Norvasc increased. Monitor BP closely.     #Dizziness w/ vertigo  -meclizine TID. Fall precautions. Mild dizziness.      #DM2  -Humalog scale, FS ACHS, diabetic education for A1C>9%. Consistent carbs/DASH diet. Evaluated by medicine c plan Lantus. Follow BG-Lantus increased.      #Otitis Media:  - Right ear fullness resolved, denies pain   - Augmentin completed    #YANETH  -elevated creatinine improving, PO fluids. Renal in.

## 2019-11-28 LAB
GLUCOSE BLDC GLUCOMTR-MCNC: 168 MG/DL — HIGH (ref 70–99)
GLUCOSE BLDC GLUCOMTR-MCNC: 193 MG/DL — HIGH (ref 70–99)
GLUCOSE BLDC GLUCOMTR-MCNC: 218 MG/DL — HIGH (ref 70–99)
GLUCOSE BLDC GLUCOMTR-MCNC: 234 MG/DL — HIGH (ref 70–99)

## 2019-11-28 PROCEDURE — 99232 SBSQ HOSP IP/OBS MODERATE 35: CPT

## 2019-11-28 RX ADMIN — ATORVASTATIN CALCIUM 80 MILLIGRAM(S): 80 TABLET, FILM COATED ORAL at 21:01

## 2019-11-28 RX ADMIN — INSULIN GLARGINE 18 UNIT(S): 100 INJECTION, SOLUTION SUBCUTANEOUS at 21:01

## 2019-11-28 RX ADMIN — Medication 37.5 MILLIGRAM(S): at 05:25

## 2019-11-28 RX ADMIN — Medication 2: at 12:13

## 2019-11-28 RX ADMIN — Medication 37.5 MILLIGRAM(S): at 21:01

## 2019-11-28 RX ADMIN — Medication 37.5 MILLIGRAM(S): at 16:49

## 2019-11-28 RX ADMIN — PANTOPRAZOLE SODIUM 40 MILLIGRAM(S): 20 TABLET, DELAYED RELEASE ORAL at 05:25

## 2019-11-28 RX ADMIN — Medication 1: at 08:00

## 2019-11-28 RX ADMIN — Medication 81 MILLIGRAM(S): at 12:17

## 2019-11-28 RX ADMIN — Medication 2: at 16:49

## 2019-11-28 RX ADMIN — AMLODIPINE BESYLATE 10 MILLIGRAM(S): 2.5 TABLET ORAL at 21:01

## 2019-11-28 RX ADMIN — ENOXAPARIN SODIUM 40 MILLIGRAM(S): 100 INJECTION SUBCUTANEOUS at 12:13

## 2019-11-28 NOTE — PROGRESS NOTE ADULT - SUBJECTIVE AND OBJECTIVE BOX
subjective:  No acute events overnight.  Patient reports that rehab is going well.  Denies new weakness, numbness or paresthesia.  Denies fevers, chills, shortness of breath, chest pain, palpitations.      PAST MEDICAL & SURGICAL HISTORY:  HTN (hypertension)  No significant past surgical history       REVIEW OF SYMPTOMS  [X] Constitutional WNL          [X] Resp WNL           [X] GI WNL                          [X]  WNL                   [X] Heme WNL                         [X] Skin WNL                 [X] MSK WNL                 [X] Psych WNL      VITALS  Vital Signs Last 24 Hrs  T(C): 36.6 (2019 07:34), Max: 36.8 (2019 19:39)  T(F): 97.9 (2019 07:34), Max: 98.3 (2019 19:39)  HR: 86 (2019 07:34) (86 - 90)  BP: 137/90 (2019 07:34) (119/82 - 144/98)  BP(mean): --  RR: 15 (2019 07:34) (14 - 15)  SpO2: 94% (2019 07:34) (94% - 96%)    PHYSICAL EXAM  Constitutional - NAD  HEENT - NCAT, EOMI  Neck - Supple, No limited ROM  Chest - CTA bilaterally, No wheeze, No rhonchi, No crackles  Cardiovascular - RRR, S1S2, No murmurs  Abdomen - BS+, Soft, NTND  Extremities - No C/C/E, No calf tenderness  Skin-no rash  Wounds-none      Neurologic Exam -Awake, Alert, AAO to self, place, time, situation. .       Psychiatric - Mood stable, Affect WNL      RECENT LABS                        16.5   10.77 )-----------( 196      ( 2019 06:15 )             47.4         137  |  103  |  22  ----------------------------<  158<H>  4.0   |  23  |  1.24    Ca    8.4      2019 06:15    TPro  7.2  /  Alb  2.7<L>  /  TBili  0.8  /  DBili  x   /  AST  15  /  ALT  31  /  AlkPhos  78        LIVER FUNCTIONS - ( 2019 06:15 )  Alb: 2.7 g/dL / Pro: 7.2 g/dL / ALK PHOS: 78 U/L / ALT: 31 U/L / AST: 15 U/L / GGT: x           Urinalysis Basic - ( 2019 08:00 )    Color: Yellow / Appearance: Clear / S.020 / pH: x  Gluc: x / Ketone: Negative  / Bili: Negative / Urobili: Negative   Blood: x / Protein: 100 / Nitrite: Negative   Leuk Esterase: Negative / RBC: Negative /HPF / WBC Negative /HPF   Sq Epi: x / Non Sq Epi: Neg.-Few / Bacteria: Negative /HPF      Direct LDL: 239 mg/dL (11-15-19 @ 08:31)    Hemoglobin A1C, Whole Blood: 9.5 % (11-15-19 @ 08:34)              RADIOLOGY/OTHER RESULTS      CURRENT MEDICATIONS  MEDICATIONS  (STANDING):  amLODIPine   Tablet 10 milliGRAM(s) Oral at bedtime  aspirin  chewable 81 milliGRAM(s) Oral daily  atorvastatin 80 milliGRAM(s) Oral at bedtime  dextrose 5%. 1000 milliLiter(s) (50 mL/Hr) IV Continuous <Continuous>  dextrose 50% Injectable 12.5 Gram(s) IV Push once  dextrose 50% Injectable 25 Gram(s) IV Push once  dextrose 50% Injectable 25 Gram(s) IV Push once  enoxaparin Injectable 40 milliGRAM(s) SubCutaneous daily  insulin glargine Injectable (LANTUS) 14 Unit(s) SubCutaneous at bedtime  insulin lispro (HumaLOG) corrective regimen sliding scale   SubCutaneous three times a day before meals  insulin lispro (HumaLOG) corrective regimen sliding scale   SubCutaneous at bedtime  meclizine 37.5 milliGRAM(s) Oral every 8 hours  pantoprazole    Tablet 40 milliGRAM(s) Oral before breakfast    MEDICATIONS  (PRN):  dextrose 40% Gel 15 Gram(s) Oral once PRN Blood Glucose LESS THAN 70 milliGRAM(s)/deciliter  glucagon  Injectable 1 milliGRAM(s) IntraMuscular once PRN Glucose LESS THAN 70 milligrams/deciliter      ASSESSMENT & PLAN      GI/Bowel Management - prn Miralax   Management - Toilet Q2  Skin - Turn Q2  Pain - Tylenol PRN  DVT PPX - Lovenox  Diet - consistent carbs/dash      Continue comprehensive acute rehab program consisting of 3hrs/day of OT/PT and SLP.

## 2019-11-29 LAB
GLUCOSE BLDC GLUCOMTR-MCNC: 164 MG/DL — HIGH (ref 70–99)
GLUCOSE BLDC GLUCOMTR-MCNC: 192 MG/DL — HIGH (ref 70–99)
GLUCOSE BLDC GLUCOMTR-MCNC: 199 MG/DL — HIGH (ref 70–99)
GLUCOSE BLDC GLUCOMTR-MCNC: 208 MG/DL — HIGH (ref 70–99)

## 2019-11-29 PROCEDURE — 99232 SBSQ HOSP IP/OBS MODERATE 35: CPT

## 2019-11-29 RX ADMIN — INSULIN GLARGINE 18 UNIT(S): 100 INJECTION, SOLUTION SUBCUTANEOUS at 21:32

## 2019-11-29 RX ADMIN — Medication 81 MILLIGRAM(S): at 11:56

## 2019-11-29 RX ADMIN — Medication 37.5 MILLIGRAM(S): at 21:32

## 2019-11-29 RX ADMIN — Medication 2: at 17:10

## 2019-11-29 RX ADMIN — AMLODIPINE BESYLATE 10 MILLIGRAM(S): 2.5 TABLET ORAL at 21:32

## 2019-11-29 RX ADMIN — PANTOPRAZOLE SODIUM 40 MILLIGRAM(S): 20 TABLET, DELAYED RELEASE ORAL at 06:02

## 2019-11-29 RX ADMIN — Medication 1: at 07:45

## 2019-11-29 RX ADMIN — ENOXAPARIN SODIUM 40 MILLIGRAM(S): 100 INJECTION SUBCUTANEOUS at 11:56

## 2019-11-29 RX ADMIN — Medication 37.5 MILLIGRAM(S): at 06:02

## 2019-11-29 RX ADMIN — Medication 1: at 11:55

## 2019-11-29 RX ADMIN — Medication 37.5 MILLIGRAM(S): at 14:32

## 2019-11-29 RX ADMIN — ATORVASTATIN CALCIUM 80 MILLIGRAM(S): 80 TABLET, FILM COATED ORAL at 21:32

## 2019-11-29 NOTE — CHART NOTE - NSCHARTNOTEFT_GEN_A_CORE
NUTRITION FOLLOW UP    SOURCE: Patient [X)   Family [ ]    Medical Record (X)    DIET: Consistent Carbohydrate, DASH/TLC     Pt reports good appetite: consuming % of meals. Denies GI distress.                CURRENT WEIGHT:   (11/21) 181.8lbs   No new weight available- recommend obtain new weight/weekly weights    PERTINENT MEDS:   Pertinent Medications: MEDICATIONS  (STANDING):  amLODIPine   Tablet 10 milliGRAM(s) Oral at bedtime  aspirin  chewable 81 milliGRAM(s) Oral daily  atorvastatin 80 milliGRAM(s) Oral at bedtime  dextrose 5%. 1000 milliLiter(s) (50 mL/Hr) IV Continuous <Continuous>  dextrose 50% Injectable 12.5 Gram(s) IV Push once  dextrose 50% Injectable 25 Gram(s) IV Push once  dextrose 50% Injectable 25 Gram(s) IV Push once  enoxaparin Injectable 40 milliGRAM(s) SubCutaneous daily  insulin glargine Injectable (LANTUS) 18 Unit(s) SubCutaneous at bedtime  insulin lispro (HumaLOG) corrective regimen sliding scale   SubCutaneous three times a day before meals  insulin lispro (HumaLOG) corrective regimen sliding scale   SubCutaneous at bedtime  meclizine 37.5 milliGRAM(s) Oral every 8 hours  pantoprazole    Tablet 40 milliGRAM(s) Oral before breakfast    MEDICATIONS  (PRN):  dextrose 40% Gel 15 Gram(s) Oral once PRN Blood Glucose LESS THAN 70 milliGRAM(s)/deciliter  glucagon  Injectable 1 milliGRAM(s) IntraMuscular once PRN Glucose LESS THAN 70 milligrams/deciliter      PERTINENT LABS:   11-26 Alb 2.7 g/dL<L> 11-15 [id number] 9.5 %<H> 11-15 Chol 310 mg/dL<H>  mg/dL<H> HDL 49 mg/dL Trig 109 mg/dL    POCT: (11/28): 168-234mg/dl  (11/29) 164mg/dl    SKIN:  intact  EDEMA: none  LAST BM: 11/28    ESTIMATED NEEDS:   [X] no change since previous assessment  [ ] recalculated:     PREVIOUS NUTRITION DIAGNOSIS:    1. Altered nutrition related lab values: ongoing diet education, consistent carbohydrate diet    NUTRITION DIAGNOSIS is :  (X)  Ongoing        NEW NUTRITION DIAGNOSIS: N/A    NUTRITION RECOMMENDATIONS:   1. Continue current nutrition plan of care  2. Ongoing diet education  3. Recommend consult to CDE for A1c >9.0%  4. Obtain new weight/weekly weights    MONITORING AND EVALUATION:   1. Tolerance to diet prescription   2. PO intake  3. Weights  4. Labs  5. Follow Up per protocol     RD to remain available   Emerald Gould RDN   Pager #[phone]

## 2019-11-29 NOTE — PROGRESS NOTE ADULT - SUBJECTIVE AND OBJECTIVE BOX
No distress    Vital Signs Last 24 Hrs  T(C): 36.7 (11-29-19 @ 07:45), Max: 36.8 (11-28-19 @ 20:04)  T(F): 98 (11-29-19 @ 07:45), Max: 98.3 (11-28-19 @ 20:04)  HR: 80 (11-29-19 @ 07:45) (73 - 80)  BP: 131/92 (11-29-19 @ 07:45) (131/92 - 157/90)  RR: 14 (11-29-19 @ 07:45) (14 - 14)  SpO2: 98% (11-29-19 @ 07:45) (97% - 98%)    Card S1S2  Lungs b/l air entry  Abd soft, NT, ND  Extr no edema                 amLODIPine   Tablet 10 milliGRAM(s) Oral at bedtime  aspirin  chewable 81 milliGRAM(s) Oral daily  atorvastatin 80 milliGRAM(s) Oral at bedtime  dextrose 40% Gel 15 Gram(s) Oral once PRN  dextrose 5%. 1000 milliLiter(s) IV Continuous <Continuous>  dextrose 50% Injectable 12.5 Gram(s) IV Push once  dextrose 50% Injectable 25 Gram(s) IV Push once  dextrose 50% Injectable 25 Gram(s) IV Push once  enoxaparin Injectable 40 milliGRAM(s) SubCutaneous daily  glucagon  Injectable 1 milliGRAM(s) IntraMuscular once PRN  insulin glargine Injectable (LANTUS) 18 Unit(s) SubCutaneous at bedtime  insulin lispro (HumaLOG) corrective regimen sliding scale   SubCutaneous three times a day before meals  insulin lispro (HumaLOG) corrective regimen sliding scale   SubCutaneous at bedtime  meclizine 37.5 milliGRAM(s) Oral every 8 hours  pantoprazole    Tablet 40 milliGRAM(s) Oral before breakfast    A/P:    Pontine CVA, dizziness  HTN  Norvasc 10mg QHS   F/u BMP  Encourage PO fluids

## 2019-11-29 NOTE — PROGRESS NOTE ADULT - SUBJECTIVE AND OBJECTIVE BOX
CHIEF COMPLAINT: no new complaints      HISTORY OF PRESENT ILLNESS    55 yo male with PMHx of HTN presents to \[facility]\"" ED with complaint of dizziness/vertigo and unsteady gait. As per pt report intermittent dizziness lasted for 2 days and progressed to constant dizziness/unable to stand without holding unto objects. Also reported two episodes of vomiting days prior, prompting urgent care evaluation. Patient was given Meclizine at the urgent care and was advised ED eval if symptoms persisted. CT head on admission shows no acute hemorrhage or mass effect. 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 Neurology evaluation requested. EKG NSR. ASA 325mg started.   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 TTE WNL. Symptoms continued to worsen in spite of permissive HTN and repeat CT was performed ruling out hemorrhagic conversion. HbA1c 9.5%. .   *Augmentin x7 days with Debrox gtts for Otitis Media. (21 Nov 2019 14:58)        PAST MEDICAL & SURGICAL HISTORY:  HTN (hypertension)  No significant past surgical history         VITALS  Vital Signs Last 24 Hrs  T(C): 36.7 (29 Nov 2019 07:45), Max: 36.8 (28 Nov 2019 20:04)  T(F): 98 (29 Nov 2019 07:45), Max: 98.3 (28 Nov 2019 20:04)  HR: 80 (29 Nov 2019 07:45) (73 - 80)  BP: 131/92 (29 Nov 2019 07:45) (131/92 - 157/90)  BP(mean): --  RR: 14 (29 Nov 2019 07:45) (14 - 14)  SpO2: 98% (29 Nov 2019 07:45) (97% - 98%)      PHYSICAL EXAM  Constitutional - NAD, Comfortable  HEENT - NCAT, EOMI  Neck - Supple, No limited ROM  Chest - CTA bilaterally,  Cardiovascular - RRR, S1S2, No murmurs  Abdomen - BS+, Soft, NTND  Extremities - No C/C/E, No calf tenderness   Skin-no rash  Neurologic Exam -   no new complaints                         Direct LDL: 239 mg/dL (11-15-19 @ 08:31)    Hemoglobin A1C, Whole Blood: 9.5 % (11-15-19 @ 08:34)              CURRENT MEDICATIONS  MEDICATIONS  (STANDING):  amLODIPine   Tablet 10 milliGRAM(s) Oral at bedtime  aspirin  chewable 81 milliGRAM(s) Oral daily  atorvastatin 80 milliGRAM(s) Oral at bedtime  dextrose 5%. 1000 milliLiter(s) (50 mL/Hr) IV Continuous <Continuous>  dextrose 50% Injectable 12.5 Gram(s) IV Push once  dextrose 50% Injectable 25 Gram(s) IV Push once  dextrose 50% Injectable 25 Gram(s) IV Push once  enoxaparin Injectable 40 milliGRAM(s) SubCutaneous daily  insulin glargine Injectable (LANTUS) 18 Unit(s) SubCutaneous at bedtime  insulin lispro (HumaLOG) corrective regimen sliding scale   SubCutaneous three times a day before meals  insulin lispro (HumaLOG) corrective regimen sliding scale   SubCutaneous at bedtime  meclizine 37.5 milliGRAM(s) Oral every 8 hours  pantoprazole    Tablet 40 milliGRAM(s) Oral before breakfast    MEDICATIONS  (PRN):  dextrose 40% Gel 15 Gram(s) Oral once PRN Blood Glucose LESS THAN 70 milliGRAM(s)/deciliter  glucagon  Injectable 1 milliGRAM(s) IntraMuscular once PRN Glucose LESS THAN 70 milligrams/deciliter

## 2019-11-30 LAB
ANION GAP SERPL CALC-SCNC: 11 MMOL/L — SIGNIFICANT CHANGE UP (ref 5–17)
BUN SERPL-MCNC: 24 MG/DL — HIGH (ref 7–23)
CALCIUM SERPL-MCNC: 9.3 MG/DL — SIGNIFICANT CHANGE UP (ref 8.4–10.5)
CHLORIDE SERPL-SCNC: 101 MMOL/L — SIGNIFICANT CHANGE UP (ref 96–108)
CO2 SERPL-SCNC: 26 MMOL/L — SIGNIFICANT CHANGE UP (ref 22–31)
CREAT SERPL-MCNC: 1.28 MG/DL — SIGNIFICANT CHANGE UP (ref 0.5–1.3)
GLUCOSE BLDC GLUCOMTR-MCNC: 164 MG/DL — HIGH (ref 70–99)
GLUCOSE BLDC GLUCOMTR-MCNC: 178 MG/DL — HIGH (ref 70–99)
GLUCOSE BLDC GLUCOMTR-MCNC: 198 MG/DL — HIGH (ref 70–99)
GLUCOSE BLDC GLUCOMTR-MCNC: 247 MG/DL — HIGH (ref 70–99)
GLUCOSE SERPL-MCNC: 152 MG/DL — HIGH (ref 70–99)
HCT VFR BLD CALC: 48.8 % — SIGNIFICANT CHANGE UP (ref 39–50)
HGB BLD-MCNC: 17.2 G/DL — HIGH (ref 13–17)
MCHC RBC-ENTMCNC: 33 PG — SIGNIFICANT CHANGE UP (ref 27–34)
MCHC RBC-ENTMCNC: 35.2 GM/DL — SIGNIFICANT CHANGE UP (ref 32–36)
MCV RBC AUTO: 93.5 FL — SIGNIFICANT CHANGE UP (ref 80–100)
NRBC # BLD: 0 /100 WBCS — SIGNIFICANT CHANGE UP (ref 0–0)
PLATELET # BLD AUTO: 230 K/UL — SIGNIFICANT CHANGE UP (ref 150–400)
POTASSIUM SERPL-MCNC: 3.9 MMOL/L — SIGNIFICANT CHANGE UP (ref 3.5–5.3)
POTASSIUM SERPL-SCNC: 3.9 MMOL/L — SIGNIFICANT CHANGE UP (ref 3.5–5.3)
RBC # BLD: 5.22 M/UL — SIGNIFICANT CHANGE UP (ref 4.2–5.8)
RBC # FLD: 11.1 % — SIGNIFICANT CHANGE UP (ref 10.3–14.5)
SODIUM SERPL-SCNC: 138 MMOL/L — SIGNIFICANT CHANGE UP (ref 135–145)
WBC # BLD: 8.97 K/UL — SIGNIFICANT CHANGE UP (ref 3.8–10.5)
WBC # FLD AUTO: 8.97 K/UL — SIGNIFICANT CHANGE UP (ref 3.8–10.5)

## 2019-11-30 PROCEDURE — 99232 SBSQ HOSP IP/OBS MODERATE 35: CPT

## 2019-11-30 RX ADMIN — ATORVASTATIN CALCIUM 80 MILLIGRAM(S): 80 TABLET, FILM COATED ORAL at 21:52

## 2019-11-30 RX ADMIN — PANTOPRAZOLE SODIUM 40 MILLIGRAM(S): 20 TABLET, DELAYED RELEASE ORAL at 06:26

## 2019-11-30 RX ADMIN — Medication 1: at 07:20

## 2019-11-30 RX ADMIN — Medication 37.5 MILLIGRAM(S): at 13:44

## 2019-11-30 RX ADMIN — Medication 37.5 MILLIGRAM(S): at 21:51

## 2019-11-30 RX ADMIN — INSULIN GLARGINE 18 UNIT(S): 100 INJECTION, SOLUTION SUBCUTANEOUS at 21:52

## 2019-11-30 RX ADMIN — Medication 2: at 16:57

## 2019-11-30 RX ADMIN — Medication 37.5 MILLIGRAM(S): at 06:26

## 2019-11-30 RX ADMIN — AMLODIPINE BESYLATE 10 MILLIGRAM(S): 2.5 TABLET ORAL at 21:51

## 2019-11-30 RX ADMIN — Medication 1: at 11:43

## 2019-11-30 RX ADMIN — Medication 81 MILLIGRAM(S): at 11:44

## 2019-11-30 RX ADMIN — ENOXAPARIN SODIUM 40 MILLIGRAM(S): 100 INJECTION SUBCUTANEOUS at 11:43

## 2019-11-30 NOTE — PROGRESS NOTE ADULT - SUBJECTIVE AND OBJECTIVE BOX
HISTORY OF PRESENT ILLNESS  53 yo male with PMHx of HTN presents to [facility] ED with complaint of dizziness/vertigo and unsteady gait. As per pt report intermittent dizziness lasted for 2 days and progressed to constant dizziness/unable to stand without holding unto objects. Also reported two episodes of vomiting days prior, prompting urgent care evaluation. Patient was given Meclizine at the urgent care and was advised ED eval if symptoms persisted. CT head on admission shows no acute hemorrhage or mass effect. 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 Neurology evaluation requested. EKG NSR. ASA 325mg started.   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 TTE WNL. Symptoms continued to worsen in spite of permissive HTN and repeat CT was performed ruling out hemorrhagic conversion. HbA1c 9.5%. .   *Augmentin x7 days with Debrox gtts for Otitis Media. (21 Nov 2019 14:58)      PAST MEDICAL & SURGICAL HISTORY:  HTN (hypertension)  No significant past surgical history       REVIEW OF SYMPTOMS/Subjective: No SOB, no n/v, no pain  [X] Constitutional WNL           [X] Resp WNL           [X] GI WNL                          [X]  WNL                   [ ] Heme WNL                      [ ] Skin WNL                 [X] MSK WNL                      [ ] Cognitive WNL        [ ] Psych WNL      VITALS  Vital Signs Last 24 Hrs  T(C): 36.9 (30 Nov 2019 07:20), Max: 37 (29 Nov 2019 20:38)  T(F): 98.4 (30 Nov 2019 07:20), Max: 98.6 (29 Nov 2019 20:38)  HR: 79 (30 Nov 2019 07:20) (79 - 87)  BP: 128/88 (30 Nov 2019 07:20) (128/88 - 138/94)  BP(mean): --  RR: 14 (30 Nov 2019 07:20) (14 - 14)  SpO2: 95% (30 Nov 2019 07:20) (95% - 95%)      PHYSICAL EXAM  Constitutional - NAD  HEENT - NCAT  Neck - Supple  Chest - CTA bilaterally, No wheeze  Cardiovascular - RRR, S1S2  Abdomen - BS+, Soft, NTND  Extremities - No C/C/E, No calf tenderness   Wounds-none    Neurologic Exam -Awake, Alert, AAO to self, place, time, situation.     motor: stable          Psychiatric - Mood stable, Affect WNL       RECENT LABS  LABS:             LABS:                        17.2   8.97  )-----------( 230      ( 30 Nov 2019 06:05 )             48.8     30 Nov 2019 06:05    138    |  101    |  24     ----------------------------<  152    3.9     |  26     |  1.28     Ca    9.3        30 Nov 2019 06:05      RADIOLOGY/OTHER RESULTS      CURRENT MEDICATIONS  MEDICATIONS  (STANDING):  aspirin  chewable 81 milliGRAM(s) Oral daily  atorvastatin 80 milliGRAM(s) Oral at bedtime  dextrose 5%. 1000 milliLiter(s) (50 mL/Hr) IV Continuous <Continuous>  dextrose 50% Injectable 12.5 Gram(s) IV Push once  dextrose 50% Injectable 25 Gram(s) IV Push once  dextrose 50% Injectable 25 Gram(s) IV Push once  enoxaparin Injectable 40 milliGRAM(s) SubCutaneous daily  insulin lispro (HumaLOG) corrective regimen sliding scale   SubCutaneous three times a day before meals  insulin lispro (HumaLOG) corrective regimen sliding scale   SubCutaneous at bedtime  insulin lispro Injectable (HumaLOG) 3 Unit(s) SubCutaneous three times a day before meals  meclizine 37.5 milliGRAM(s) Oral every 8 hours  pantoprazole    Tablet 40 milliGRAM(s) Oral before breakfast    MEDICATIONS  (PRN):  dextrose 40% Gel 15 Gram(s) Oral once PRN Blood Glucose LESS THAN 70 milliGRAM(s)/deciliter  glucagon  Injectable 1 milliGRAM(s) IntraMuscular once PRN Glucose LESS THAN 70 milligrams/deciliter

## 2019-11-30 NOTE — PROGRESS NOTE ADULT - ASSESSMENT
55 yo male s/p right sided cerebellar CVA with unsteady gait and functional deficits admitted to rehab [facility]    #CVA  -Continue PT/OT/SLP program, fall precautions. Lovenox for DVT ppx.   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   -ASA 81mg/Lipitor 80mg. Follow LFTs on high dose statin.     #HTN  -renal note appreciated, Norvasc 10mg QHS. Monitor BP closely.     #Dizziness w/ vertigo  -meclizine TID. Fall precautions.     #DM2  -Humalog scale, FS ACHS, diabetic education for A1C>9%. Consistent carbs/DASH diet. Evaluated by medicine c plan Lantus. Follow BG-    #Otitis Media:  - Augmentin completed    #YANETH  -elevated creatinine improving, PO fluids. Renal following

## 2019-12-01 LAB
GLUCOSE BLDC GLUCOMTR-MCNC: 166 MG/DL — HIGH (ref 70–99)
GLUCOSE BLDC GLUCOMTR-MCNC: 200 MG/DL — HIGH (ref 70–99)
GLUCOSE BLDC GLUCOMTR-MCNC: 210 MG/DL — HIGH (ref 70–99)
GLUCOSE BLDC GLUCOMTR-MCNC: 239 MG/DL — HIGH (ref 70–99)

## 2019-12-01 PROCEDURE — 99232 SBSQ HOSP IP/OBS MODERATE 35: CPT

## 2019-12-01 RX ADMIN — Medication 37.5 MILLIGRAM(S): at 13:10

## 2019-12-01 RX ADMIN — PANTOPRAZOLE SODIUM 40 MILLIGRAM(S): 20 TABLET, DELAYED RELEASE ORAL at 05:31

## 2019-12-01 RX ADMIN — Medication 81 MILLIGRAM(S): at 11:42

## 2019-12-01 RX ADMIN — Medication 37.5 MILLIGRAM(S): at 21:25

## 2019-12-01 RX ADMIN — AMLODIPINE BESYLATE 10 MILLIGRAM(S): 2.5 TABLET ORAL at 21:25

## 2019-12-01 RX ADMIN — ENOXAPARIN SODIUM 40 MILLIGRAM(S): 100 INJECTION SUBCUTANEOUS at 11:42

## 2019-12-01 RX ADMIN — Medication 1: at 07:56

## 2019-12-01 RX ADMIN — INSULIN GLARGINE 18 UNIT(S): 100 INJECTION, SOLUTION SUBCUTANEOUS at 21:25

## 2019-12-01 RX ADMIN — ATORVASTATIN CALCIUM 80 MILLIGRAM(S): 80 TABLET, FILM COATED ORAL at 21:25

## 2019-12-01 RX ADMIN — Medication 37.5 MILLIGRAM(S): at 05:31

## 2019-12-01 RX ADMIN — Medication 1: at 16:43

## 2019-12-01 RX ADMIN — Medication 2: at 11:42

## 2019-12-01 NOTE — PROGRESS NOTE ADULT - SUBJECTIVE AND OBJECTIVE BOX
HISTORY OF PRESENT ILLNESS  55 yo male with PMHx of HTN presents to [facility] ED with complaint of dizziness/vertigo and unsteady gait. As per pt report intermittent dizziness lasted for 2 days and progressed to constant dizziness/unable to stand without holding unto objects. Also reported two episodes of vomiting days prior, prompting urgent care evaluation. Patient was given Meclizine at the urgent care and was advised ED eval if symptoms persisted. CT head on admission shows no acute hemorrhage or mass effect. 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 Neurology evaluation requested. EKG NSR. ASA 325mg started.   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 TTE WNL. Symptoms continued to worsen in spite of permissive HTN and repeat CT was performed ruling out hemorrhagic conversion. HbA1c 9.5%. .   *Augmentin x7 days with Debrox gtts for Otitis Media. (21 Nov 2019 14:58)      PAST MEDICAL & SURGICAL HISTORY:  HTN (hypertension)  No significant past surgical history       REVIEW OF SYMPTOMS/Subjective: No SOB, no n/v, no pain  [X] Constitutional WNL           [X] Resp WNL           [X] GI WNL                          [X]  WNL                   [ ] Heme WNL                      [ ] Skin WNL                 [X] MSK WNL                      [ ] Cognitive WNL        [ ] Psych WNL      VITALS  Vital Signs Last 24 Hrs  T(C): 36.7 (01 Dec 2019 07:12), Max: 36.7 (01 Dec 2019 07:12)  T(F): 98 (01 Dec 2019 07:12), Max: 98 (01 Dec 2019 07:12)  HR: 90 (01 Dec 2019 07:12) (80 - 90)  BP: 132/90 (01 Dec 2019 07:12) (128/90 - 132/90)  BP(mean): --  RR: 14 (01 Dec 2019 07:12) (14 - 14)  SpO2: 95% (01 Dec 2019 07:12) (94% - 95%)      PHYSICAL EXAM  Constitutional - NAD  HEENT - NCAT  Neck - Supple  Chest - CTA bilaterally, No wheeze  Cardiovascular - RRR, S1S2  Abdomen - BS+, Soft, NTND  Extremities - No C/C/E, No calf tenderness   Wounds-none    Neurologic Exam -Awake, Alert, AAO to self, place, time, situation.     motor: stable          Psychiatric - Mood stable, Affect WNL       RECENT LABS  LABS:             LABS:                        17.2   8.97  )-----------( 230      ( 30 Nov 2019 06:05 )             48.8     30 Nov 2019 06:05    138    |  101    |  24     ----------------------------<  152    3.9     |  26     |  1.28     Ca    9.3        30 Nov 2019 06:05      RADIOLOGY/OTHER RESULTS      CURRENT MEDICATIONS  MEDICATIONS  (STANDING):  aspirin  chewable 81 milliGRAM(s) Oral daily  atorvastatin 80 milliGRAM(s) Oral at bedtime  dextrose 5%. 1000 milliLiter(s) (50 mL/Hr) IV Continuous <Continuous>  dextrose 50% Injectable 12.5 Gram(s) IV Push once  dextrose 50% Injectable 25 Gram(s) IV Push once  dextrose 50% Injectable 25 Gram(s) IV Push once  enoxaparin Injectable 40 milliGRAM(s) SubCutaneous daily  insulin lispro (HumaLOG) corrective regimen sliding scale   SubCutaneous three times a day before meals  insulin lispro (HumaLOG) corrective regimen sliding scale   SubCutaneous at bedtime  insulin lispro Injectable (HumaLOG) 3 Unit(s) SubCutaneous three times a day before meals  meclizine 37.5 milliGRAM(s) Oral every 8 hours  pantoprazole    Tablet 40 milliGRAM(s) Oral before breakfast    MEDICATIONS  (PRN):  dextrose 40% Gel 15 Gram(s) Oral once PRN Blood Glucose LESS THAN 70 milliGRAM(s)/deciliter  glucagon  Injectable 1 milliGRAM(s) IntraMuscular once PRN Glucose LESS THAN 70 milligrams/deciliter

## 2019-12-02 LAB
ANION GAP SERPL CALC-SCNC: 10 MMOL/L — SIGNIFICANT CHANGE UP (ref 5–17)
BUN SERPL-MCNC: 30 MG/DL — HIGH (ref 7–23)
CALCIUM SERPL-MCNC: 8.8 MG/DL — SIGNIFICANT CHANGE UP (ref 8.4–10.5)
CHLORIDE SERPL-SCNC: 101 MMOL/L — SIGNIFICANT CHANGE UP (ref 96–108)
CO2 SERPL-SCNC: 23 MMOL/L — SIGNIFICANT CHANGE UP (ref 22–31)
CREAT SERPL-MCNC: 1.3 MG/DL — SIGNIFICANT CHANGE UP (ref 0.5–1.3)
GLUCOSE BLDC GLUCOMTR-MCNC: 143 MG/DL — HIGH (ref 70–99)
GLUCOSE BLDC GLUCOMTR-MCNC: 243 MG/DL — HIGH (ref 70–99)
GLUCOSE BLDC GLUCOMTR-MCNC: 269 MG/DL — HIGH (ref 70–99)
GLUCOSE BLDC GLUCOMTR-MCNC: 343 MG/DL — HIGH (ref 70–99)
GLUCOSE SERPL-MCNC: 179 MG/DL — HIGH (ref 70–99)
HCT VFR BLD CALC: 48.4 % — SIGNIFICANT CHANGE UP (ref 39–50)
HGB BLD-MCNC: 16.6 G/DL — SIGNIFICANT CHANGE UP (ref 13–17)
MCHC RBC-ENTMCNC: 32.4 PG — SIGNIFICANT CHANGE UP (ref 27–34)
MCHC RBC-ENTMCNC: 34.3 GM/DL — SIGNIFICANT CHANGE UP (ref 32–36)
MCV RBC AUTO: 94.3 FL — SIGNIFICANT CHANGE UP (ref 80–100)
NRBC # BLD: 0 /100 WBCS — SIGNIFICANT CHANGE UP (ref 0–0)
PLATELET # BLD AUTO: 245 K/UL — SIGNIFICANT CHANGE UP (ref 150–400)
POTASSIUM SERPL-MCNC: 4.2 MMOL/L — SIGNIFICANT CHANGE UP (ref 3.5–5.3)
POTASSIUM SERPL-SCNC: 4.2 MMOL/L — SIGNIFICANT CHANGE UP (ref 3.5–5.3)
RBC # BLD: 5.13 M/UL — SIGNIFICANT CHANGE UP (ref 4.2–5.8)
RBC # FLD: 11.2 % — SIGNIFICANT CHANGE UP (ref 10.3–14.5)
SODIUM SERPL-SCNC: 134 MMOL/L — LOW (ref 135–145)
WBC # BLD: 7.36 K/UL — SIGNIFICANT CHANGE UP (ref 3.8–10.5)
WBC # FLD AUTO: 7.36 K/UL — SIGNIFICANT CHANGE UP (ref 3.8–10.5)

## 2019-12-02 PROCEDURE — 99233 SBSQ HOSP IP/OBS HIGH 50: CPT

## 2019-12-02 RX ORDER — INSULIN GLARGINE 100 [IU]/ML
22 INJECTION, SOLUTION SUBCUTANEOUS AT BEDTIME
Refills: 0 | Status: DISCONTINUED | OUTPATIENT
Start: 2019-12-02 | End: 2019-12-03

## 2019-12-02 RX ADMIN — Medication 81 MILLIGRAM(S): at 12:02

## 2019-12-02 RX ADMIN — ATORVASTATIN CALCIUM 80 MILLIGRAM(S): 80 TABLET, FILM COATED ORAL at 21:02

## 2019-12-02 RX ADMIN — INSULIN GLARGINE 22 UNIT(S): 100 INJECTION, SOLUTION SUBCUTANEOUS at 21:03

## 2019-12-02 RX ADMIN — ENOXAPARIN SODIUM 40 MILLIGRAM(S): 100 INJECTION SUBCUTANEOUS at 12:02

## 2019-12-02 RX ADMIN — Medication 4: at 16:47

## 2019-12-02 RX ADMIN — Medication 37.5 MILLIGRAM(S): at 13:41

## 2019-12-02 RX ADMIN — AMLODIPINE BESYLATE 10 MILLIGRAM(S): 2.5 TABLET ORAL at 21:02

## 2019-12-02 RX ADMIN — Medication 37.5 MILLIGRAM(S): at 21:02

## 2019-12-02 RX ADMIN — Medication 37.5 MILLIGRAM(S): at 06:05

## 2019-12-02 RX ADMIN — Medication 3: at 12:00

## 2019-12-02 RX ADMIN — PANTOPRAZOLE SODIUM 40 MILLIGRAM(S): 20 TABLET, DELAYED RELEASE ORAL at 06:05

## 2019-12-02 NOTE — PROGRESS NOTE ADULT - ASSESSMENT
55 yo male s/p right sided cerebellar CVA with unsteady gait and functional deficits admitted to rehab [facility]    #CVA  -on PT/OT/SLP program, fall precautions. Lovenox for DVT ppx. Discharge planning in progress.   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   -ASA 81mg/Lipitor 80mg. Follow LFTs on high dose statin.     #HTN  -renal evaluation completed, BP regimen adjusted. Monitor BP closely.     #Dizziness w/ vertigo  -meclizine TID. Fall precautions. Mild dizziness.      #DM2  -Humalog scale, FS ACHS, diabetic education for A1C>9%. Consistent carbs/DASH diet. Evaluated by medicine c plan Lantus. Follow BG-Lantus increased.   -Start diabetes teaching     #Otitis Media:  - Right ear fullness today. Medicine evaluation requested     #YANETH  -elevated creatinine improved, Renal in.

## 2019-12-02 NOTE — PROGRESS NOTE ADULT - SUBJECTIVE AND OBJECTIVE BOX
Patient is a 54y old  Male who presents with a chief complaint of s/p right cerebellar CVA with deficits (02 Dec 2019 12:18)      INTERVAL History of Present Illness/OVERNIGHT EVENTS: feels right ear muffled for 2 weeks    MEDICATIONS  (STANDING):  amLODIPine   Tablet 10 milliGRAM(s) Oral at bedtime  aspirin  chewable 81 milliGRAM(s) Oral daily  atorvastatin 80 milliGRAM(s) Oral at bedtime  dextrose 5%. 1000 milliLiter(s) (50 mL/Hr) IV Continuous <Continuous>  dextrose 50% Injectable 12.5 Gram(s) IV Push once  dextrose 50% Injectable 25 Gram(s) IV Push once  dextrose 50% Injectable 25 Gram(s) IV Push once  enoxaparin Injectable 40 milliGRAM(s) SubCutaneous daily  insulin glargine Injectable (LANTUS) 22 Unit(s) SubCutaneous at bedtime  insulin lispro (HumaLOG) corrective regimen sliding scale   SubCutaneous three times a day before meals  insulin lispro (HumaLOG) corrective regimen sliding scale   SubCutaneous at bedtime  meclizine 37.5 milliGRAM(s) Oral every 8 hours  pantoprazole    Tablet 40 milliGRAM(s) Oral before breakfast    MEDICATIONS  (PRN):  dextrose 40% Gel 15 Gram(s) Oral once PRN Blood Glucose LESS THAN 70 milliGRAM(s)/deciliter  glucagon  Injectable 1 milliGRAM(s) IntraMuscular once PRN Glucose LESS THAN 70 milligrams/deciliter      Allergies    No Known Allergies    Intolerances        REVIEW OF SYSTEMS:  Negative unless otherwise specified above.    Vital Signs Last 24 Hrs  T(C): 36.8 (02 Dec 2019 07:24), Max: 36.9 (01 Dec 2019 19:47)  T(F): 98.3 (02 Dec 2019 07:24), Max: 98.5 (01 Dec 2019 19:47)  HR: 78 (02 Dec 2019 07:24) (78 - 83)  BP: 129/90 (02 Dec 2019 07:24) (127/85 - 129/90)  BP(mean): --  RR: 14 (02 Dec 2019 07:24) (14 - 14)  SpO2: 96% (02 Dec 2019 07:24) (94% - 96%)        PHYSICAL EXAM:  GENERAL: No apparent distress, appears stated age  HEAD:  Atraumatic, Normocephalic  EYES: Conjunctiva and sclera clear, no discharge  ENMT: Moist mucous membranes, no nasal discharge, no external ear erythema, pain, discharge  NECK: Supple, no JVD  CHEST/LUNG: Clear to auscultation bilaterally, no wheeze or rales  HEART: Regular rate and rhythm, no murmurs, rubs or gallops  ABDOMEN: Soft, Nontender, Nondistended; Bowel sounds present  EXTREMITIES:  No clubbing, cyanosis or edema  SKIN: No rash or new discoloration        LABS:                        16.6   7.36  )-----------( 245      ( 02 Dec 2019 05:20 )             48.4     02 Dec 2019 05:20    134    |  101    |  30     ----------------------------<  179    4.2     |  23     |  1.30     Ca    8.8        02 Dec 2019 05:20          CAPILLARY BLOOD GLUCOSE      POCT Blood Glucose.: 269 mg/dL (02 Dec 2019 11:58)  POCT Blood Glucose.: 143 mg/dL (02 Dec 2019 07:31)  POCT Blood Glucose.: 239 mg/dL (01 Dec 2019 21:24)  POCT Blood Glucose.: 200 mg/dL (01 Dec 2019 16:40)      RADIOLOGY & ADDITIONAL TESTS:    Images reviewed personally    Consultant Notes Reviewed and Care Discussed with relevant Consultants/Other Providers.

## 2019-12-02 NOTE — PROGRESS NOTE ADULT - SUBJECTIVE AND OBJECTIVE BOX
Rehab progress    CHIEF COMPLAINT: Ear fullness and pain.       HISTORY OF PRESENT ILLNESS  53 yo male with PMHx of HTN presents to [facility] ED with complaint of dizziness/vertigo and unsteady gait. As per pt report intermittent dizziness lasted for 2 days and progressed to constant dizziness/unable to stand without holding unto objects. Also reported two episodes of vomiting days prior, prompting urgent care evaluation. Patient was given Meclizine at the urgent care and was advised ED eval if symptoms persisted. CT head on admission shows no acute hemorrhage or mass effect. 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 Neurology evaluation requested. EKG NSR. ASA 325mg started.   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 TTE WNL. Symptoms continued to worsen in spite of permissive HTN and repeat CT was performed ruling out hemorrhagic conversion. HbA1c 9.5%. .   *Augmentin x7 days with Debrox gtts for Otitis Media. (21 Nov 2019 14:58)      PAST MEDICAL & SURGICAL HISTORY:  HTN (hypertension)  No significant past surgical history       REVIEW OF SYMPTOMS  [X] Constitutional WNL           [X] Resp WNL           [X] GI WNL                          [X]  WNL                   [X] Heme WNL                          [X] Skin WNL                 [X] MSK WNL                  VITALS  Vital Signs Last 24 Hrs  T(C): 36.8 (02 Dec 2019 07:24), Max: 36.9 (01 Dec 2019 19:47)  T(F): 98.3 (02 Dec 2019 07:24), Max: 98.5 (01 Dec 2019 19:47)  HR: 78 (02 Dec 2019 07:24) (78 - 83)  BP: 129/90 (02 Dec 2019 07:24) (127/85 - 129/90)  BP(mean): --  RR: 14 (02 Dec 2019 07:24) (14 - 14)  SpO2: 96% (02 Dec 2019 07:24) (94% - 96%)      PHYSICAL EXAM  Constitutional - Ear fullness.   HEENT - NCAT, EOMI  Neck - Supple, No limited ROM  Chest - CTA bilaterally, No wheeze, No rhonchi, No crackles  Cardiovascular - RRR, S1S2, No murmurs  Abdomen - BS+, Soft, NTND  Extremities - No C/C/E, No calf tenderness   Skin-no rash      Neurologic Exam -                   HIF  - Awake, Alert, AAO to self, place, date, year, situation      No aphasia, normal attention, normal concentration, no apraxia, agnosia     Cranial Nerves - CN 2-12 intact     Motor - No focal deficits                            Sensory - Intact to LT,PP,Temprature,JPS, vibration                      Cortical sensory modalities intact     Reflexes - DTR Intact     Toes are flexor     Coordination/dysmetria - FTN intact             Balance - WNL Static and dynamic     Psychiatric - Mood stable, Affect WNL         FUNCTIONAL PROGRESS  Gait -   ADLs -   Transfers -  Functional transfer -     RECENT LABS                        16.6   7.36  )-----------( 245      ( 02 Dec 2019 05:20 )             48.4     12-02    134<L>  |  101  |  30<H>  ----------------------------<  179<H>  4.2   |  23  |  1.30    Ca    8.8      02 Dec 2019 05:20            Direct LDL: 239 mg/dL (11-15-19 @ 08:31)    Hemoglobin A1C, Whole Blood: 9.5 % (11-15-19 @ 08:34)              RADIOLOGY/OTHER RESULTS      CURRENT MEDICATIONS  MEDICATIONS  (STANDING):  amLODIPine   Tablet 10 milliGRAM(s) Oral at bedtime  aspirin  chewable 81 milliGRAM(s) Oral daily  atorvastatin 80 milliGRAM(s) Oral at bedtime  dextrose 5%. 1000 milliLiter(s) (50 mL/Hr) IV Continuous <Continuous>  dextrose 50% Injectable 12.5 Gram(s) IV Push once  dextrose 50% Injectable 25 Gram(s) IV Push once  dextrose 50% Injectable 25 Gram(s) IV Push once  enoxaparin Injectable 40 milliGRAM(s) SubCutaneous daily  insulin glargine Injectable (LANTUS) 22 Unit(s) SubCutaneous at bedtime  insulin lispro (HumaLOG) corrective regimen sliding scale   SubCutaneous three times a day before meals  insulin lispro (HumaLOG) corrective regimen sliding scale   SubCutaneous at bedtime  meclizine 37.5 milliGRAM(s) Oral every 8 hours  pantoprazole    Tablet 40 milliGRAM(s) Oral before breakfast    MEDICATIONS  (PRN):  dextrose 40% Gel 15 Gram(s) Oral once PRN Blood Glucose LESS THAN 70 milliGRAM(s)/deciliter  glucagon  Injectable 1 milliGRAM(s) IntraMuscular once PRN Glucose LESS THAN 70 milligrams/deciliter      ASSESSMENT & PLAN          GI/Bowel Management - Dulcolax PRN, Fleet PRN   Management - Toilet Q2  Skin - Turn Q2  Pain - Tylenol PRN  DVT PPX - TEDs, SCDs  Diet -     Continue comprehensive acute rehab program consisting of 3hrs/day of OT/PT and SLP. Rehab progress    CHIEF COMPLAINT: Ear fullness and pain.       HISTORY OF PRESENT ILLNESS  55 yo male with PMHx of HTN presents to [facility] ED with complaint of dizziness/vertigo and unsteady gait. As per pt report intermittent dizziness lasted for 2 days and progressed to constant dizziness/unable to stand without holding unto objects. Also reported two episodes of vomiting days prior, prompting urgent care evaluation. Patient was given Meclizine at the urgent care and was advised ED eval if symptoms persisted. CT head on admission shows no acute hemorrhage or mass effect. 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 Neurology evaluation requested. EKG NSR. ASA 325mg started.   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 TTE WNL. Symptoms continued to worsen in spite of permissive HTN and repeat CT was performed ruling out hemorrhagic conversion. HbA1c 9.5%. .   *Augmentin x7 days with Debrox gtts for Otitis Media. (21 Nov 2019 14:58)      PAST MEDICAL & SURGICAL HISTORY:  HTN (hypertension)  No significant past surgical history       REVIEW OF SYMPTOMS  [X] Constitutional WNL           [X] Resp WNL           [X] GI WNL                          [X]  WNL                   [X] Heme WNL                          [X] Skin WNL                 [X] MSK WNL                  VITALS  Vital Signs Last 24 Hrs  T(C): 36.8 (02 Dec 2019 07:24), Max: 36.9 (01 Dec 2019 19:47)  T(F): 98.3 (02 Dec 2019 07:24), Max: 98.5 (01 Dec 2019 19:47)  HR: 78 (02 Dec 2019 07:24) (78 - 83)  BP: 129/90 (02 Dec 2019 07:24) (127/85 - 129/90)  BP(mean): --  RR: 14 (02 Dec 2019 07:24) (14 - 14)  SpO2: 96% (02 Dec 2019 07:24) (94% - 96%)      PHYSICAL EXAM  Constitutional - Right Ear fullness.   HEENT - NCAT, EOMI  Neck - Supple, No limited ROM  Chest - CTA bilaterally, No wheeze, No rhonchi, No crackles  Cardiovascular - RRR, S1S2, No murmurs  Abdomen - BS+, Soft, NTND  Extremities - No C/C/E, No calf tenderness   Skin-no rash      Neurologic Exam - at baseline, no new deficits.                      FUNCTIONAL PROGRESS  Gait - 120ft min/mod A  ADLs - supervision  Transfers -min A  Functional transfer - min A    RECENT LABS                        16.6   7.36  )-----------( 245      ( 02 Dec 2019 05:20 )             48.4     12-02    134<L>  |  101  |  30<H>  ----------------------------<  179<H>  4.2   |  23  |  1.30    Ca    8.8      02 Dec 2019 05:20            Direct LDL: 239 mg/dL (11-15-19 @ 08:31)    Hemoglobin A1C, Whole Blood: 9.5 % (11-15-19 @ 08:34)              RADIOLOGY/OTHER RESULTS      CURRENT MEDICATIONS  MEDICATIONS  (STANDING):  amLODIPine   Tablet 10 milliGRAM(s) Oral at bedtime  aspirin  chewable 81 milliGRAM(s) Oral daily  atorvastatin 80 milliGRAM(s) Oral at bedtime  dextrose 5%. 1000 milliLiter(s) (50 mL/Hr) IV Continuous <Continuous>  dextrose 50% Injectable 12.5 Gram(s) IV Push once  dextrose 50% Injectable 25 Gram(s) IV Push once  dextrose 50% Injectable 25 Gram(s) IV Push once  enoxaparin Injectable 40 milliGRAM(s) SubCutaneous daily  insulin glargine Injectable (LANTUS) 22 Unit(s) SubCutaneous at bedtime  insulin lispro (HumaLOG) corrective regimen sliding scale   SubCutaneous three times a day before meals  insulin lispro (HumaLOG) corrective regimen sliding scale   SubCutaneous at bedtime  meclizine 37.5 milliGRAM(s) Oral every 8 hours  pantoprazole    Tablet 40 milliGRAM(s) Oral before breakfast    MEDICATIONS  (PRN):  dextrose 40% Gel 15 Gram(s) Oral once PRN Blood Glucose LESS THAN 70 milliGRAM(s)/deciliter  glucagon  Injectable 1 milliGRAM(s) IntraMuscular once PRN Glucose LESS THAN 70 milligrams/deciliter      ASSESSMENT & PLAN          GI/Bowel Management - prn   Management - Toilet Q2  Skin - Turn Q2  Pain - Tylenol PRN  DVT PPX - Lovenox  Diet - consistent carbs    Continue comprehensive acute rehab program consisting of 3hrs/day of OT/PT and SLP.

## 2019-12-02 NOTE — PROGRESS NOTE ADULT - SUBJECTIVE AND OBJECTIVE BOX
No distress    Vital Signs Last 24 Hrs  T(C): 36.8 (12-02-19 @ 07:24), Max: 36.9 (12-01-19 @ 19:47)  T(F): 98.3 (12-02-19 @ 07:24), Max: 98.5 (12-01-19 @ 19:47)  HR: 78 (12-02-19 @ 07:24) (78 - 83)  BP: 129/90 (12-02-19 @ 07:24) (127/85 - 129/90)  RR: 14 (12-02-19 @ 07:24) (14 - 14)  SpO2: 96% (12-02-19 @ 07:24) (94% - 96%)    Card S1S2  Lungs b/l air entry  Abd soft, NT, ND  Extr no edema                                     16.6   7.36  )-----------( 245      ( 02 Dec 2019 05:20 )             48.4     02 Dec 2019 05:20    134    |  101    |  30     ----------------------------<  179    4.2     |  23     |  1.30     Ca    8.8        02 Dec 2019 05:20    amLODIPine   Tablet 10 milliGRAM(s) Oral at bedtime  aspirin  chewable 81 milliGRAM(s) Oral daily  atorvastatin 80 milliGRAM(s) Oral at bedtime  dextrose 40% Gel 15 Gram(s) Oral once PRN  dextrose 5%. 1000 milliLiter(s) IV Continuous <Continuous>  dextrose 50% Injectable 12.5 Gram(s) IV Push once  dextrose 50% Injectable 25 Gram(s) IV Push once  dextrose 50% Injectable 25 Gram(s) IV Push once  enoxaparin Injectable 40 milliGRAM(s) SubCutaneous daily  glucagon  Injectable 1 milliGRAM(s) IntraMuscular once PRN  insulin glargine Injectable (LANTUS) 22 Unit(s) SubCutaneous at bedtime  insulin lispro (HumaLOG) corrective regimen sliding scale   SubCutaneous three times a day before meals  insulin lispro (HumaLOG) corrective regimen sliding scale   SubCutaneous at bedtime  meclizine 37.5 milliGRAM(s) Oral every 8 hours  pantoprazole    Tablet 40 milliGRAM(s) Oral before breakfast    A/P:    Pontine CVA, dizziness  HTN  BP controlled on Norvasc 10mg QHS   Stable renal fx  Rehab

## 2019-12-02 NOTE — PROGRESS NOTE ADULT - ASSESSMENT
54M with PMH of HTN presented with dizziness/unsteady gait Found to have subacute infarction and evolving infarction in right brachium pontis is admitted here for rehab program.    #Right cerebellar CVA (Subacute/acute evolving infarction)  Cont with comprehensive rehab program  cont with aspirin/statin  Meclizine prn for dizziness    #HTN  Blood pressure meds with hold parameters     #Newly diagnosed Diabetes type 2  11-15 [id number] 9.5  adjust insulin dose daily as appropriate based on glucose levels.     Mild right ear hearing loss since CVA  -outpatient ENT f/up and audiology testing  -monitor clinically inpatient    #DVT PPX: s/c lovenox

## 2019-12-03 LAB
GLUCOSE BLDC GLUCOMTR-MCNC: 158 MG/DL — HIGH (ref 70–99)
GLUCOSE BLDC GLUCOMTR-MCNC: 172 MG/DL — HIGH (ref 70–99)
GLUCOSE BLDC GLUCOMTR-MCNC: 198 MG/DL — HIGH (ref 70–99)
GLUCOSE BLDC GLUCOMTR-MCNC: 244 MG/DL — HIGH (ref 70–99)

## 2019-12-03 PROCEDURE — 99232 SBSQ HOSP IP/OBS MODERATE 35: CPT

## 2019-12-03 RX ORDER — INSULIN LISPRO 100/ML
VIAL (ML) SUBCUTANEOUS AT BEDTIME
Refills: 0 | Status: DISCONTINUED | OUTPATIENT
Start: 2019-12-03 | End: 2019-12-10

## 2019-12-03 RX ORDER — INSULIN GLARGINE 100 [IU]/ML
26 INJECTION, SOLUTION SUBCUTANEOUS AT BEDTIME
Refills: 0 | Status: DISCONTINUED | OUTPATIENT
Start: 2019-12-03 | End: 2019-12-10

## 2019-12-03 RX ORDER — INSULIN LISPRO 100/ML
VIAL (ML) SUBCUTANEOUS
Refills: 0 | Status: DISCONTINUED | OUTPATIENT
Start: 2019-12-03 | End: 2019-12-10

## 2019-12-03 RX ORDER — INSULIN LISPRO 100/ML
4 VIAL (ML) SUBCUTANEOUS ONCE
Refills: 0 | Status: COMPLETED | OUTPATIENT
Start: 2019-12-03 | End: 2019-12-03

## 2019-12-03 RX ADMIN — Medication 4 UNIT(S): at 12:14

## 2019-12-03 RX ADMIN — Medication 37.5 MILLIGRAM(S): at 13:20

## 2019-12-03 RX ADMIN — ENOXAPARIN SODIUM 40 MILLIGRAM(S): 100 INJECTION SUBCUTANEOUS at 12:05

## 2019-12-03 RX ADMIN — ATORVASTATIN CALCIUM 80 MILLIGRAM(S): 80 TABLET, FILM COATED ORAL at 21:13

## 2019-12-03 RX ADMIN — PANTOPRAZOLE SODIUM 40 MILLIGRAM(S): 20 TABLET, DELAYED RELEASE ORAL at 05:30

## 2019-12-03 RX ADMIN — Medication 37.5 MILLIGRAM(S): at 05:30

## 2019-12-03 RX ADMIN — Medication 1: at 07:51

## 2019-12-03 RX ADMIN — INSULIN GLARGINE 26 UNIT(S): 100 INJECTION, SOLUTION SUBCUTANEOUS at 21:17

## 2019-12-03 RX ADMIN — Medication 0: at 21:17

## 2019-12-03 RX ADMIN — Medication 37.5 MILLIGRAM(S): at 21:17

## 2019-12-03 RX ADMIN — AMLODIPINE BESYLATE 10 MILLIGRAM(S): 2.5 TABLET ORAL at 21:13

## 2019-12-03 RX ADMIN — Medication 81 MILLIGRAM(S): at 12:05

## 2019-12-03 RX ADMIN — Medication 2: at 16:49

## 2019-12-03 NOTE — PROGRESS NOTE ADULT - ASSESSMENT
54M with PMH of HTN presented with dizziness/unsteady gait Found to have subacute infarction and evolving infarction in right brachium pontis is admitted here for rehab program.    #Right cerebellar CVA (Subacute/acute evolving infarction)  Cont with comprehensive rehab program  cont with aspirin/statin  Meclizine prn for dizziness    #HTN  Blood pressure meds with hold parameters     #Newly diagnosed Diabetes type 2  11-15 [id number] 9.5  adjust insulin dose daily as appropriate based on glucose levels.   Please consider Aspart 3 units with meals and moderate dose SSI    Mild right ear hearing loss since CVA  -outpatient ENT f/up and audiology testing  -monitor clinically inpatient    #DVT PPX: s/c lovenox    d/w rehab team

## 2019-12-03 NOTE — PROGRESS NOTE ADULT - ASSESSMENT
53 yo male s/p right sided cerebellar CVA with unsteady gait and functional deficits admitted to rehab [facility]    #CVA  -on PT/OT/SLP program, fall precautions. Lovenox for DVT ppx. Discharge planning in progress.   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   -ASA 81mg/Lipitor 80mg. Follow LFTs on high dose statin.     #HTN  -renal evaluation completed, BP regimen adjusted. Monitor BP closely.     #Dizziness w/ vertigo  -meclizine TID. Fall precautions. Mild dizziness.      #DM2  -Humalog scale to medium coverage, FS ACHS, diabetic education for A1C>9%. Consistent carbs/DASH diet. Evaluated by medicine- Lantus on D/c. Follow BG.   -Start diabetes teaching     #Otitis Media:  - Right ear fullness improved. Medicine evaluation requested.     #YANETH  -elevated creatinine improved, Renal in.

## 2019-12-03 NOTE — PROGRESS NOTE ADULT - SUBJECTIVE AND OBJECTIVE BOX
Patient is a 54y old  Male who presents with a chief complaint of s/p right cerebellar CVA with deficits (02 Dec 2019 18:25)      INTERVAL History of Present Illness/OVERNIGHT EVENTS: right ear feels the same - pt aware of outpt ENT f/up  labile glucose    MEDICATIONS  (STANDING):  amLODIPine   Tablet 10 milliGRAM(s) Oral at bedtime  aspirin  chewable 81 milliGRAM(s) Oral daily  atorvastatin 80 milliGRAM(s) Oral at bedtime  dextrose 5%. 1000 milliLiter(s) (50 mL/Hr) IV Continuous <Continuous>  dextrose 50% Injectable 12.5 Gram(s) IV Push once  dextrose 50% Injectable 25 Gram(s) IV Push once  dextrose 50% Injectable 25 Gram(s) IV Push once  enoxaparin Injectable 40 milliGRAM(s) SubCutaneous daily  insulin glargine Injectable (LANTUS) 22 Unit(s) SubCutaneous at bedtime  insulin lispro (HumaLOG) corrective regimen sliding scale   SubCutaneous three times a day before meals  insulin lispro (HumaLOG) corrective regimen sliding scale   SubCutaneous at bedtime  meclizine 37.5 milliGRAM(s) Oral every 8 hours  pantoprazole    Tablet 40 milliGRAM(s) Oral before breakfast    MEDICATIONS  (PRN):  dextrose 40% Gel 15 Gram(s) Oral once PRN Blood Glucose LESS THAN 70 milliGRAM(s)/deciliter  glucagon  Injectable 1 milliGRAM(s) IntraMuscular once PRN Glucose LESS THAN 70 milligrams/deciliter      Allergies    No Known Allergies    Intolerances        REVIEW OF SYSTEMS:  Negative unless otherwise specified above.    Vital Signs Last 24 Hrs  T(C): 36.7 (03 Dec 2019 07:55), Max: 36.9 (02 Dec 2019 20:51)  T(F): 98 (03 Dec 2019 07:55), Max: 98.5 (02 Dec 2019 20:51)  HR: 96 (03 Dec 2019 07:55) (88 - 96)  BP: 123/89 (03 Dec 2019 07:55) (123/89 - 130/90)  BP(mean): --  RR: 16 (03 Dec 2019 07:55) (14 - 16)  SpO2: 95% (03 Dec 2019 07:55) (95% - 95%)        PHYSICAL EXAM:  GENERAL: No apparent distress, appears stated age  HEAD:  Atraumatic, Normocephalic  EYES: Conjunctiva and sclera clear, no discharge  ENMT: Moist mucous membranes, no nasal discharge  NECK: Supple, no JVD  CHEST/LUNG: Clear to auscultation bilaterally, no wheeze or rales  HEART: Regular rate and rhythm, no murmurs, rubs or gallops  ABDOMEN: Soft, Nontender, Nondistended; Bowel sounds present  EXTREMITIES:  No clubbing, cyanosis or edema  SKIN: No rash or new discoloration        LABS:      Ca    8.8        02 Dec 2019 05:20          CAPILLARY BLOOD GLUCOSE      POCT Blood Glucose.: 158 mg/dL (03 Dec 2019 07:48)  POCT Blood Glucose.: 243 mg/dL (02 Dec 2019 20:59)  POCT Blood Glucose.: 343 mg/dL (02 Dec 2019 16:43)  POCT Blood Glucose.: 269 mg/dL (02 Dec 2019 11:58)      RADIOLOGY & ADDITIONAL TESTS:    Images reviewed personally    Consultant Notes Reviewed and Care Discussed with relevant Consultants/Other Providers.

## 2019-12-03 NOTE — PROGRESS NOTE ADULT - SUBJECTIVE AND OBJECTIVE BOX
Rehab progress    CHIEF COMPLAINT: Improving balance and gait.       HISTORY OF PRESENT ILLNESS  53 yo male with PMHx of HTN presents to [facility] ED with complaint of dizziness/vertigo and unsteady gait. As per pt report intermittent dizziness lasted for 2 days and progressed to constant dizziness/unable to stand without holding unto objects. Also reported two episodes of vomiting days prior, prompting urgent care evaluation. Patient was given Meclizine at the urgent care and was advised ED eval if symptoms persisted. CT head on admission shows no acute hemorrhage or mass effect. 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 Neurology evaluation requested. EKG NSR. ASA 325mg started.   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 TTE WNL. Symptoms continued to worsen in spite of permissive HTN and repeat CT was performed ruling out hemorrhagic conversion. HbA1c 9.5%. .   *Augmentin x7 days with Debrox gtts for Otitis Media. (21 Nov 2019 14:58)      PAST MEDICAL & SURGICAL HISTORY:  HTN (hypertension)  No significant past surgical history       REVIEW OF SYMPTOMS  [X] Constitutional WNL         [X] Resp WNL           [X] GI WNL                  [X]  WNL                   [X] Heme WNL                [X] Skin WNL                 [X] MSK WNL              [X] Psych WNL      VITALS  Vital Signs Last 24 Hrs  T(C): 36.7 (03 Dec 2019 07:55), Max: 36.9 (02 Dec 2019 20:51)  T(F): 98 (03 Dec 2019 07:55), Max: 98.5 (02 Dec 2019 20:51)  HR: 96 (03 Dec 2019 07:55) (88 - 96)  BP: 123/89 (03 Dec 2019 07:55) (123/89 - 130/90)  BP(mean): --  RR: 16 (03 Dec 2019 07:55) (14 - 16)  SpO2: 95% (03 Dec 2019 07:55) (95% - 95%)      PHYSICAL EXAM  Constitutional - Right Ear fullness.   HEENT - NCAT, EOMI  Neck - Supple, No limited ROM  Chest - CTA bilaterally, No wheeze, No rhonchi, No crackles  Cardiovascular - RRR, S1S2, No murmurs  Abdomen - BS+, Soft, NTND  Extremities - No C/C/E, No calf tenderness   Skin-no rash      Neurologic Exam - at baseline, no new deficits.                      FUNCTIONAL PROGRESS  Gait - 120ft min/mod A  ADLs - supervision  Transfers -min A  Functional transfer - min A      RECENT LABS                        16.6   7.36  )-----------( 245      ( 02 Dec 2019 05:20 )             48.4     12-02    134<L>  |  101  |  30<H>  ----------------------------<  179<H>  4.2   |  23  |  1.30    Ca    8.8      02 Dec 2019 05:20            Direct LDL: 239 mg/dL (11-15-19 @ 08:31)    Hemoglobin A1C, Whole Blood: 9.5 % (11-15-19 @ 08:34)              RADIOLOGY/OTHER RESULTS      CURRENT MEDICATIONS  MEDICATIONS  (STANDING):  amLODIPine   Tablet 10 milliGRAM(s) Oral at bedtime  aspirin  chewable 81 milliGRAM(s) Oral daily  atorvastatin 80 milliGRAM(s) Oral at bedtime  dextrose 5%. 1000 milliLiter(s) (50 mL/Hr) IV Continuous <Continuous>  dextrose 50% Injectable 12.5 Gram(s) IV Push once  dextrose 50% Injectable 25 Gram(s) IV Push once  dextrose 50% Injectable 25 Gram(s) IV Push once  enoxaparin Injectable 40 milliGRAM(s) SubCutaneous daily  insulin glargine Injectable (LANTUS) 22 Unit(s) SubCutaneous at bedtime  insulin lispro (HumaLOG) corrective regimen sliding scale   SubCutaneous three times a day before meals  insulin lispro (HumaLOG) corrective regimen sliding scale   SubCutaneous at bedtime  meclizine 37.5 milliGRAM(s) Oral every 8 hours  pantoprazole    Tablet 40 milliGRAM(s) Oral before breakfast    MEDICATIONS  (PRN):  dextrose 40% Gel 15 Gram(s) Oral once PRN Blood Glucose LESS THAN 70 milliGRAM(s)/deciliter  glucagon  Injectable 1 milliGRAM(s) IntraMuscular once PRN Glucose LESS THAN 70 milligrams/deciliter      ASSESSMENT & PLAN          GI/Bowel Management - prn   Management - Toilet Q2  Skin - Turn Q2  Pain - Tylenol PRN  DVT PPX - Lovenox  Diet - consistent carbs    Continue comprehensive acute rehab program consisting of 3hrs/day of OT/PT and SLP.

## 2019-12-04 LAB
ALBUMIN SERPL ELPH-MCNC: 3.1 G/DL — LOW (ref 3.3–5)
ALP SERPL-CCNC: 88 U/L — SIGNIFICANT CHANGE UP (ref 40–120)
ALT FLD-CCNC: 34 U/L — SIGNIFICANT CHANGE UP (ref 10–45)
ANION GAP SERPL CALC-SCNC: 10 MMOL/L — SIGNIFICANT CHANGE UP (ref 5–17)
AST SERPL-CCNC: 19 U/L — SIGNIFICANT CHANGE UP (ref 10–40)
BILIRUB SERPL-MCNC: 0.8 MG/DL — SIGNIFICANT CHANGE UP (ref 0.2–1.2)
BUN SERPL-MCNC: 24 MG/DL — HIGH (ref 7–23)
CALCIUM SERPL-MCNC: 9.1 MG/DL — SIGNIFICANT CHANGE UP (ref 8.4–10.5)
CHLORIDE SERPL-SCNC: 101 MMOL/L — SIGNIFICANT CHANGE UP (ref 96–108)
CO2 SERPL-SCNC: 26 MMOL/L — SIGNIFICANT CHANGE UP (ref 22–31)
CREAT SERPL-MCNC: 1.43 MG/DL — HIGH (ref 0.5–1.3)
GLUCOSE BLDC GLUCOMTR-MCNC: 127 MG/DL — HIGH (ref 70–99)
GLUCOSE BLDC GLUCOMTR-MCNC: 179 MG/DL — HIGH (ref 70–99)
GLUCOSE BLDC GLUCOMTR-MCNC: 187 MG/DL — HIGH (ref 70–99)
GLUCOSE BLDC GLUCOMTR-MCNC: 231 MG/DL — HIGH (ref 70–99)
GLUCOSE SERPL-MCNC: 214 MG/DL — HIGH (ref 70–99)
HCT VFR BLD CALC: 50.3 % — HIGH (ref 39–50)
HGB BLD-MCNC: 17.3 G/DL — HIGH (ref 13–17)
MCHC RBC-ENTMCNC: 32.3 PG — SIGNIFICANT CHANGE UP (ref 27–34)
MCHC RBC-ENTMCNC: 34.4 GM/DL — SIGNIFICANT CHANGE UP (ref 32–36)
MCV RBC AUTO: 94 FL — SIGNIFICANT CHANGE UP (ref 80–100)
NRBC # BLD: 0 /100 WBCS — SIGNIFICANT CHANGE UP (ref 0–0)
PLATELET # BLD AUTO: 255 K/UL — SIGNIFICANT CHANGE UP (ref 150–400)
POTASSIUM SERPL-MCNC: 4.3 MMOL/L — SIGNIFICANT CHANGE UP (ref 3.5–5.3)
POTASSIUM SERPL-SCNC: 4.3 MMOL/L — SIGNIFICANT CHANGE UP (ref 3.5–5.3)
PROT SERPL-MCNC: 8.1 G/DL — SIGNIFICANT CHANGE UP (ref 6–8.3)
RBC # BLD: 5.35 M/UL — SIGNIFICANT CHANGE UP (ref 4.2–5.8)
RBC # FLD: 11.1 % — SIGNIFICANT CHANGE UP (ref 10.3–14.5)
SODIUM SERPL-SCNC: 137 MMOL/L — SIGNIFICANT CHANGE UP (ref 135–145)
WBC # BLD: 7.66 K/UL — SIGNIFICANT CHANGE UP (ref 3.8–10.5)
WBC # FLD AUTO: 7.66 K/UL — SIGNIFICANT CHANGE UP (ref 3.8–10.5)

## 2019-12-04 PROCEDURE — 99232 SBSQ HOSP IP/OBS MODERATE 35: CPT

## 2019-12-04 RX ORDER — MECLIZINE HCL 12.5 MG
25 TABLET ORAL EVERY 8 HOURS
Refills: 0 | Status: DISCONTINUED | OUTPATIENT
Start: 2019-12-04 | End: 2019-12-05

## 2019-12-04 RX ADMIN — PANTOPRAZOLE SODIUM 40 MILLIGRAM(S): 20 TABLET, DELAYED RELEASE ORAL at 06:08

## 2019-12-04 RX ADMIN — Medication 81 MILLIGRAM(S): at 11:04

## 2019-12-04 RX ADMIN — ATORVASTATIN CALCIUM 80 MILLIGRAM(S): 80 TABLET, FILM COATED ORAL at 21:47

## 2019-12-04 RX ADMIN — Medication 2: at 16:40

## 2019-12-04 RX ADMIN — Medication 25 MILLIGRAM(S): at 21:47

## 2019-12-04 RX ADMIN — ENOXAPARIN SODIUM 40 MILLIGRAM(S): 100 INJECTION SUBCUTANEOUS at 11:04

## 2019-12-04 RX ADMIN — INSULIN GLARGINE 26 UNIT(S): 100 INJECTION, SOLUTION SUBCUTANEOUS at 21:47

## 2019-12-04 RX ADMIN — Medication 25 MILLIGRAM(S): at 16:38

## 2019-12-04 RX ADMIN — Medication 37.5 MILLIGRAM(S): at 06:08

## 2019-12-04 RX ADMIN — Medication 4: at 11:11

## 2019-12-04 RX ADMIN — AMLODIPINE BESYLATE 10 MILLIGRAM(S): 2.5 TABLET ORAL at 21:47

## 2019-12-04 NOTE — PROGRESS NOTE ADULT - SUBJECTIVE AND OBJECTIVE BOX
Rehab progress    CHIEF COMPLAINT: Dizziness/vertigo improving. Ear fullness.       HISTORY OF PRESENT ILLNESS  53 yo male with PMHx of HTN presents to [facility] ED with complaint of dizziness/vertigo and unsteady gait. As per pt report intermittent dizziness lasted for 2 days and progressed to constant dizziness/unable to stand without holding unto objects. Also reported two episodes of vomiting days prior, prompting urgent care evaluation. Patient was given Meclizine at the urgent care and was advised ED eval if symptoms persisted. CT head on admission shows no acute hemorrhage or mass effect. 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 Neurology evaluation requested. EKG NSR. ASA 325mg started.   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 TTE WNL. Symptoms continued to worsen in spite of permissive HTN and repeat CT was performed ruling out hemorrhagic conversion. HbA1c 9.5%. .   *Augmentin x7 days with Debrox gtts for Otitis Media. (21 Nov 2019 14:58)      PAST MEDICAL & SURGICAL HISTORY:  HTN (hypertension)  No significant past surgical history       REVIEW OF SYMPTOMS  [X] Constitutional WNL          [X] Resp WNL           [X] GI WNL             [X] Skin WNL                 [X] MSK WNL                  VITALS  Vital Signs Last 24 Hrs  T(C): 36.8 (04 Dec 2019 08:12), Max: 36.8 (03 Dec 2019 21:00)  T(F): 98.2 (04 Dec 2019 08:12), Max: 98.3 (03 Dec 2019 21:00)  HR: 84 (04 Dec 2019 08:12) (84 - 84)  BP: 121/89 (04 Dec 2019 08:12) (121/89 - 124/81)  BP(mean): --  RR: 18 (04 Dec 2019 08:12) (15 - 18)  SpO2: 97% (04 Dec 2019 08:12) (95% - 97%)      PHYSICAL EXAM  Constitutional - NAD  HEENT - NCAT, EOMI  Neck - Supple, No limited ROM  Chest - No wheeze, No rhonchi, No crackles  Cardiovascular - RRR, S1S2, No murmurs  Abdomen - BS+, Soft, NTND  Extremities - No C/C/E, No calf tenderness   Skin-no rash  Wounds-none      Neurologic Exam -Awake, Alert, AAO to self, place, date, year, situation, dysarthria, dysmetria, poor balance.      Psychiatric - Mood stable, Affect WNL         FUNCTIONAL PROGRESS  Gait - 120ft RW CG-min A  ADLs - CG  Transfers -CG  Functional transfer - CG    RECENT LABS                        17.3   7.66  )-----------( 255      ( 04 Dec 2019 09:28 )             50.3     12-04    137  |  101  |  24<H>  ----------------------------<  214<H>  4.3   |  26  |  1.43<H>    Ca    9.1      04 Dec 2019 09:28    TPro  8.1  /  Alb  3.1<L>  /  TBili  0.8  /  DBili  x   /  AST  19  /  ALT  34  /  AlkPhos  88  12-04      LIVER FUNCTIONS - ( 04 Dec 2019 09:28 )  Alb: 3.1 g/dL / Pro: 8.1 g/dL / ALK PHOS: 88 U/L / ALT: 34 U/L / AST: 19 U/L / GGT: x             Direct LDL: 239 mg/dL (11-15-19 @ 08:31)    Hemoglobin A1C, Whole Blood: 9.5 % (11-15-19 @ 08:34)              RADIOLOGY/OTHER RESULTS      CURRENT MEDICATIONS  MEDICATIONS  (STANDING):  amLODIPine   Tablet 10 milliGRAM(s) Oral at bedtime  aspirin  chewable 81 milliGRAM(s) Oral daily  atorvastatin 80 milliGRAM(s) Oral at bedtime  dextrose 5%. 1000 milliLiter(s) (50 mL/Hr) IV Continuous <Continuous>  dextrose 50% Injectable 12.5 Gram(s) IV Push once  dextrose 50% Injectable 25 Gram(s) IV Push once  dextrose 50% Injectable 25 Gram(s) IV Push once  enoxaparin Injectable 40 milliGRAM(s) SubCutaneous daily  insulin glargine Injectable (LANTUS) 26 Unit(s) SubCutaneous at bedtime  insulin lispro (HumaLOG) corrective regimen sliding scale   SubCutaneous three times a day before meals  insulin lispro (HumaLOG) corrective regimen sliding scale   SubCutaneous at bedtime  meclizine 25 milliGRAM(s) Oral every 8 hours  pantoprazole    Tablet 40 milliGRAM(s) Oral before breakfast    MEDICATIONS  (PRN):  dextrose 40% Gel 15 Gram(s) Oral once PRN Blood Glucose LESS THAN 70 milliGRAM(s)/deciliter  glucagon  Injectable 1 milliGRAM(s) IntraMuscular once PRN Glucose LESS THAN 70 milligrams/deciliter      ASSESSMENT & PLAN    GI/Bowel Management - prn   Management - Toilet Q2  Skin - Turn Q2  Pain - Tylenol PRN  DVT PPX - lantus  Diet - consistent carbs    Continue comprehensive acute rehab program consisting of 3hrs/day of OT/PT and SLP.

## 2019-12-04 NOTE — PROGRESS NOTE ADULT - ASSESSMENT
55 yo male s/p right sided cerebellar CVA with unsteady gait and functional deficits admitted to rehab [facility]    #CVA  -on PT/OT/SLP program, fall precautions. Lovenox for DVT ppx. Discharge planning in progress.   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   -ASA 81mg/Lipitor 80mg. Follow LFTs on high dose statin.     #HTN  -renal evaluation completed, BP regimen adjusted. Monitor BP closely.     #Dizziness w/ vertigo  - Meclizine taper to 25mg TID. Fall precautions. Mild dizziness.      #DM2  -Humalog scale to medium coverage, FS ACHS, diabetic education for A1C>9%. Consistent carbs/DASH diet. Evaluated by medicine- Lantus continues home. Follow BG.   -Start diabetes teaching     #Otitis Media:  - Right ear fullness improved. Medicine evaluation requested. Warm compress to right ear.     #YANETH  -elevated creatinine, encourage PO fluids. Renal in.

## 2019-12-05 LAB
GLUCOSE BLDC GLUCOMTR-MCNC: 128 MG/DL — HIGH (ref 70–99)
GLUCOSE BLDC GLUCOMTR-MCNC: 169 MG/DL — HIGH (ref 70–99)
GLUCOSE BLDC GLUCOMTR-MCNC: 197 MG/DL — HIGH (ref 70–99)
GLUCOSE BLDC GLUCOMTR-MCNC: 214 MG/DL — HIGH (ref 70–99)

## 2019-12-05 PROCEDURE — 99232 SBSQ HOSP IP/OBS MODERATE 35: CPT

## 2019-12-05 RX ORDER — ONDANSETRON 8 MG/1
4 TABLET, FILM COATED ORAL
Refills: 0 | Status: DISCONTINUED | OUTPATIENT
Start: 2019-12-05 | End: 2019-12-10

## 2019-12-05 RX ADMIN — Medication 2: at 16:54

## 2019-12-05 RX ADMIN — AMLODIPINE BESYLATE 10 MILLIGRAM(S): 2.5 TABLET ORAL at 21:29

## 2019-12-05 RX ADMIN — Medication 0: at 21:29

## 2019-12-05 RX ADMIN — Medication 4: at 11:56

## 2019-12-05 RX ADMIN — PANTOPRAZOLE SODIUM 40 MILLIGRAM(S): 20 TABLET, DELAYED RELEASE ORAL at 05:23

## 2019-12-05 RX ADMIN — Medication 25 MILLIGRAM(S): at 05:22

## 2019-12-05 RX ADMIN — ATORVASTATIN CALCIUM 80 MILLIGRAM(S): 80 TABLET, FILM COATED ORAL at 21:28

## 2019-12-05 RX ADMIN — Medication 81 MILLIGRAM(S): at 11:56

## 2019-12-05 RX ADMIN — INSULIN GLARGINE 26 UNIT(S): 100 INJECTION, SOLUTION SUBCUTANEOUS at 21:30

## 2019-12-05 RX ADMIN — ENOXAPARIN SODIUM 40 MILLIGRAM(S): 100 INJECTION SUBCUTANEOUS at 11:55

## 2019-12-05 NOTE — CHART NOTE - NSCHARTNOTEFT_GEN_A_CORE
Nutrition Follow Up Note  Hospital Course (Per Electronic Medical Record): 53 yo male s/p right sided cerebellar CVA with unsteady gait and functional deficits admitted to rehab [facility]  Source: Medical Record [X] Patient [X] Family [ ]         Diet: Consistent Carbohydrate, DASH/TLC- consuming 100% of meals, pt reports good appetite. Provided review of nutrition education on consistent carbohydrate diet, strategies to reduce elevated HbA1c.  Encouraged PO fluid intake.    Current Weight: 181 lbs (11/21) Recommend weekly weights per policy    Pertinent Medications: MEDICATIONS  (STANDING):  amLODIPine   Tablet 10 milliGRAM(s) Oral at bedtime  aspirin  chewable 81 milliGRAM(s) Oral daily  atorvastatin 80 milliGRAM(s) Oral at bedtime  dextrose 5%. 1000 milliLiter(s) (50 mL/Hr) IV Continuous <Continuous>  dextrose 50% Injectable 12.5 Gram(s) IV Push once  dextrose 50% Injectable 25 Gram(s) IV Push once  dextrose 50% Injectable 25 Gram(s) IV Push once  enoxaparin Injectable 40 milliGRAM(s) SubCutaneous daily  insulin glargine Injectable (LANTUS) 26 Unit(s) SubCutaneous at bedtime  insulin lispro (HumaLOG) corrective regimen sliding scale   SubCutaneous three times a day before meals  insulin lispro (HumaLOG) corrective regimen sliding scale   SubCutaneous at bedtime  pantoprazole    Tablet 40 milliGRAM(s) Oral before breakfast    MEDICATIONS  (PRN):  dextrose 40% Gel 15 Gram(s) Oral once PRN Blood Glucose LESS THAN 70 milliGRAM(s)/deciliter  glucagon  Injectable 1 milliGRAM(s) IntraMuscular once PRN Glucose LESS THAN 70 milligrams/deciliter  ondansetron    Tablet 4 milliGRAM(s) Oral two times a day PRN Nausea and/or Vomiting      Pertinent Labs:  12-04 Na137 mmol/L Glu 214 mg/dL<H> K+ 4.3 mmol/L Cr  1.43 mg/dL<H> BUN 24 mg/dL<H> 12-04 Alb 3.1 g/dL<L> 11-15 [id number] 9.5 %<H> 11-15 Chol 310 mg/dL<H>  mg/dL<H> HDL 49 mg/dL Trig 109 mg/dL        Skin: Intact of PU    Edema: None noted per flowsheets    Last BM: on 12/4, no c/o n/v/d/c    Estimated Needs:   [X] No Change since Previous Assessment  [ ] Recalculated:     Previous Nutrition Diagnosis:   Altered nutrition related lab values: ongoing diet education, consistent carbohydrate diet    Nutrition Diagnosis is [X] Ongoing    [ ] Resolved   [ ] Not Applicable      New Nutrition Diagnosis: [X] Not Applicable  [ ] Inadequate Protein Energy Intake   [ ] Inadequate Oral Intake   [ ] Excessive Energy Intake   [ ] Increased Nutrient Needs   [ ] Obesity   [ ] Altered GI Function   [ ] Unintended Weight Loss   [ ] Food & Nutrition Related Knowledge Deficit  [ ] Limited Adherence to nutrition related recommendations   [ ] Malnutrition      Interventions:   1. Recommend continue w/ current plan of care  2. Provided nutrition education on DM management, monitoring carbohydrates  3. Pt would benefit from seeing CDE d/t elevated HbA1c, newly diagnosed DM    Monitoring & Evaluation:   [X] Weights   [X] PO Intake   [X] Follow Up (Per Protocol)  [X] Tolerance to Diet Prescription   [X] Other: Labs & PCOT    RD Remains Available.  Mindi Bui RD

## 2019-12-05 NOTE — PROGRESS NOTE ADULT - SUBJECTIVE AND OBJECTIVE BOX
Rehab progress    CHIEF COMPLAINT: Improving balance and dizziness.      HISTORY OF PRESENT ILLNESS  55 yo male with PMHx of HTN presents to [facility] ED with complaint of dizziness/vertigo and unsteady gait. As per pt report intermittent dizziness lasted for 2 days and progressed to constant dizziness/unable to stand without holding unto objects. Also reported two episodes of vomiting days prior, prompting urgent care evaluation. Patient was given Meclizine at the urgent care and was advised ED eval if symptoms persisted. CT head on admission shows no acute hemorrhage or mass effect. 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 Neurology evaluation requested. EKG NSR. ASA 325mg started.   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 TTE WNL. Symptoms continued to worsen in spite of permissive HTN and repeat CT was performed ruling out hemorrhagic conversion. HbA1c 9.5%. .   *Augmentin x7 days with Debrox gtts for Otitis Media. (21 Nov 2019 14:58)      PAST MEDICAL & SURGICAL HISTORY:  HTN (hypertension)  No significant past surgical history       REVIEW OF SYMPTOMS  [X] Constitutional WNL         [X] Resp WNL           [X] GI WNL                         [X] Heme WNL                      [X] Skin WNL               [X] MSK WNL                [X] Psych WNL      VITALS  Vital Signs Last 24 Hrs  T(C): 36.7 (05 Dec 2019 07:21), Max: 37.1 (04 Dec 2019 20:31)  T(F): 98.1 (05 Dec 2019 07:21), Max: 98.8 (04 Dec 2019 20:31)  HR: 77 (05 Dec 2019 07:21) (77 - 80)  BP: 138/89 (05 Dec 2019 07:21) (116/76 - 138/89)  BP(mean): --  RR: 14 (05 Dec 2019 07:21) (14 - 14)  SpO2: 96% (05 Dec 2019 07:21) (96% - 96%)    PHYSICAL EXAM  Constitutional - NAD  HEENT - NCAT, EOMI  Neck - Supple, No limited ROM  Chest - No wheeze, No rhonchi, No crackles  Cardiovascular - RRR, S1S2, No murmurs  Abdomen - BS+, Soft, NTND  Extremities - No C/C/E, No calf tenderness   Skin-no rash  Wounds-none      Neurologic Exam -Awake, Alert, AAO to self, place, date, year, situation, dysarthria, dysmetria, poor balance.      Psychiatric - Mood stable, Affect WNL       FUNCTIONAL PROGRESS  Gait - 120ft RW CG-min A  ADLs - CG  Transfers -CG  Functional transfer - CG    RECENT LABS                        17.3   7.66  )-----------( 255      ( 04 Dec 2019 09:28 )             50.3     12-04    137  |  101  |  24<H>  ----------------------------<  214<H>  4.3   |  26  |  1.43<H>    Ca    9.1      04 Dec 2019 09:28    TPro  8.1  /  Alb  3.1<L>  /  TBili  0.8  /  DBili  x   /  AST  19  /  ALT  34  /  AlkPhos  88  12-04      LIVER FUNCTIONS - ( 04 Dec 2019 09:28 )  Alb: 3.1 g/dL / Pro: 8.1 g/dL / ALK PHOS: 88 U/L / ALT: 34 U/L / AST: 19 U/L / GGT: x             Direct LDL: 239 mg/dL (11-15-19 @ 08:31)    Hemoglobin A1C, Whole Blood: 9.5 % (11-15-19 @ 08:34)              RADIOLOGY/OTHER RESULTS      CURRENT MEDICATIONS  MEDICATIONS  (STANDING):  amLODIPine   Tablet 10 milliGRAM(s) Oral at bedtime  aspirin  chewable 81 milliGRAM(s) Oral daily  atorvastatin 80 milliGRAM(s) Oral at bedtime  dextrose 5%. 1000 milliLiter(s) (50 mL/Hr) IV Continuous <Continuous>  dextrose 50% Injectable 12.5 Gram(s) IV Push once  dextrose 50% Injectable 25 Gram(s) IV Push once  dextrose 50% Injectable 25 Gram(s) IV Push once  enoxaparin Injectable 40 milliGRAM(s) SubCutaneous daily  insulin glargine Injectable (LANTUS) 26 Unit(s) SubCutaneous at bedtime  insulin lispro (HumaLOG) corrective regimen sliding scale   SubCutaneous three times a day before meals  insulin lispro (HumaLOG) corrective regimen sliding scale   SubCutaneous at bedtime  pantoprazole    Tablet 40 milliGRAM(s) Oral before breakfast    MEDICATIONS  (PRN):  dextrose 40% Gel 15 Gram(s) Oral once PRN Blood Glucose LESS THAN 70 milliGRAM(s)/deciliter  glucagon  Injectable 1 milliGRAM(s) IntraMuscular once PRN Glucose LESS THAN 70 milligrams/deciliter  ondansetron    Tablet 4 milliGRAM(s) Oral two times a day PRN Nausea and/or Vomiting      ASSESSMENT & PLAN    GI/Bowel Management - prn   Management - Toilet Q2  Skin - Turn Q2  Pain - Tylenol PRN  DVT PPX - lantus  Diet - consistent carbs    Continue comprehensive acute rehab program consisting of 3hrs/day of OT/PT and SLP.

## 2019-12-05 NOTE — PROGRESS NOTE ADULT - ASSESSMENT
55 yo male s/p right sided cerebellar CVA with unsteady gait and functional deficits admitted to rehab [facility]    #CVA  -on PT/OT/SLP program, fall precautions. Lovenox for DVT ppx. Discharge planning in progress.   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   -ASA 81mg/Lipitor 80mg. Follow LFTs on high dose statin.     #HTN  -renal evaluation completed, BP regimen adjusted. Monitor BP closely.     #Dizziness w/ vertigo  - vertigo improving, no nausea. Meclizine stopped. Zofran prn. Fall precautions.       #DM2  -Humalog scale to medium coverage, FS ACHS, diabetic education for A1C>9%. Consistent carbs/DASH diet. Evaluated by medicine- Lantus continues home. Follow BG.   -Start diabetes teaching     #Otitis Media:  - Right ear fullness improved. Medicine evaluation requested. Warm compress to right ear.     #YANETH  -elevated creatinine, encourage PO fluids. Renal in.

## 2019-12-06 LAB
ANION GAP SERPL CALC-SCNC: 12 MMOL/L — SIGNIFICANT CHANGE UP (ref 5–17)
BUN SERPL-MCNC: 23 MG/DL — SIGNIFICANT CHANGE UP (ref 7–23)
CALCIUM SERPL-MCNC: 9.1 MG/DL — SIGNIFICANT CHANGE UP (ref 8.4–10.5)
CHLORIDE SERPL-SCNC: 103 MMOL/L — SIGNIFICANT CHANGE UP (ref 96–108)
CO2 SERPL-SCNC: 25 MMOL/L — SIGNIFICANT CHANGE UP (ref 22–31)
CREAT SERPL-MCNC: 1.29 MG/DL — SIGNIFICANT CHANGE UP (ref 0.5–1.3)
GLUCOSE BLDC GLUCOMTR-MCNC: 101 MG/DL — HIGH (ref 70–99)
GLUCOSE BLDC GLUCOMTR-MCNC: 143 MG/DL — HIGH (ref 70–99)
GLUCOSE BLDC GLUCOMTR-MCNC: 167 MG/DL — HIGH (ref 70–99)
GLUCOSE BLDC GLUCOMTR-MCNC: 178 MG/DL — HIGH (ref 70–99)
GLUCOSE SERPL-MCNC: 95 MG/DL — SIGNIFICANT CHANGE UP (ref 70–99)
HCT VFR BLD CALC: 45.3 % — SIGNIFICANT CHANGE UP (ref 39–50)
HGB BLD-MCNC: 15.9 G/DL — SIGNIFICANT CHANGE UP (ref 13–17)
MCHC RBC-ENTMCNC: 32.8 PG — SIGNIFICANT CHANGE UP (ref 27–34)
MCHC RBC-ENTMCNC: 35.1 GM/DL — SIGNIFICANT CHANGE UP (ref 32–36)
MCV RBC AUTO: 93.4 FL — SIGNIFICANT CHANGE UP (ref 80–100)
NRBC # BLD: 0 /100 WBCS — SIGNIFICANT CHANGE UP (ref 0–0)
PLATELET # BLD AUTO: 246 K/UL — SIGNIFICANT CHANGE UP (ref 150–400)
POTASSIUM SERPL-MCNC: 3.8 MMOL/L — SIGNIFICANT CHANGE UP (ref 3.5–5.3)
POTASSIUM SERPL-SCNC: 3.8 MMOL/L — SIGNIFICANT CHANGE UP (ref 3.5–5.3)
RBC # BLD: 4.85 M/UL — SIGNIFICANT CHANGE UP (ref 4.2–5.8)
RBC # FLD: 11.2 % — SIGNIFICANT CHANGE UP (ref 10.3–14.5)
SODIUM SERPL-SCNC: 140 MMOL/L — SIGNIFICANT CHANGE UP (ref 135–145)
WBC # BLD: 7.83 K/UL — SIGNIFICANT CHANGE UP (ref 3.8–10.5)
WBC # FLD AUTO: 7.83 K/UL — SIGNIFICANT CHANGE UP (ref 3.8–10.5)

## 2019-12-06 PROCEDURE — 99232 SBSQ HOSP IP/OBS MODERATE 35: CPT

## 2019-12-06 RX ADMIN — AMLODIPINE BESYLATE 10 MILLIGRAM(S): 2.5 TABLET ORAL at 22:24

## 2019-12-06 RX ADMIN — INSULIN GLARGINE 26 UNIT(S): 100 INJECTION, SOLUTION SUBCUTANEOUS at 22:23

## 2019-12-06 RX ADMIN — PANTOPRAZOLE SODIUM 40 MILLIGRAM(S): 20 TABLET, DELAYED RELEASE ORAL at 06:19

## 2019-12-06 RX ADMIN — ATORVASTATIN CALCIUM 80 MILLIGRAM(S): 80 TABLET, FILM COATED ORAL at 22:24

## 2019-12-06 RX ADMIN — Medication 81 MILLIGRAM(S): at 11:37

## 2019-12-06 RX ADMIN — Medication 2: at 16:54

## 2019-12-06 RX ADMIN — Medication 2: at 11:37

## 2019-12-06 RX ADMIN — ENOXAPARIN SODIUM 40 MILLIGRAM(S): 100 INJECTION SUBCUTANEOUS at 11:37

## 2019-12-06 NOTE — PROGRESS NOTE ADULT - SUBJECTIVE AND OBJECTIVE BOX
Progress note    CHIEF COMPLAINT: Dizziness when moving. Leaning to right.       HISTORY OF PRESENT ILLNESS  55 yo male with PMHx of HTN presents to [facility] ED with complaint of dizziness/vertigo and unsteady gait. As per pt report intermittent dizziness lasted for 2 days and progressed to constant dizziness/unable to stand without holding unto objects. Also reported two episodes of vomiting days prior, prompting urgent care evaluation. Patient was given Meclizine at the urgent care and was advised ED eval if symptoms persisted. CT head on admission shows no acute hemorrhage or mass effect. 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 Neurology evaluation requested. EKG NSR. ASA 325mg started.   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 TTE WNL. Symptoms continued to worsen in spite of permissive HTN and repeat CT was performed ruling out hemorrhagic conversion. HbA1c 9.5%. .   *Augmentin x7 days with Debrox gtts for Otitis Media. (21 Nov 2019 14:58)      PAST MEDICAL & SURGICAL HISTORY:  HTN (hypertension)  No significant past surgical history       REVIEW OF SYMPTOMS  [X] Constitutional WNL            [X] Resp WNL           [X] GI WNL                       [X]  WNL                [X] Skin WNL                 [X] MSK WNL                   [X] Psych WNL      VITALS  Vital Signs Last 24 Hrs  T(C): 37.1 (05 Dec 2019 21:10), Max: 37.1 (05 Dec 2019 21:10)  T(F): 98.8 (05 Dec 2019 21:10), Max: 98.8 (05 Dec 2019 21:10)  HR: 90 (05 Dec 2019 21:10) (90 - 90)  BP: 128/88 (05 Dec 2019 21:10) (128/88 - 128/88)  BP(mean): --  RR: 16 (05 Dec 2019 21:10) (16 - 16)  SpO2: 96% (05 Dec 2019 21:10) (96% - 96%)      PHYSICAL EXAM  Constitutional - NAD  HEENT - NCAT, EOMI  Neck - Supple, No limited ROM  Chest - No wheeze, No rhonchi, No crackles  Cardiovascular - RRR, S1S2, No murmurs  Abdomen - BS+, Soft, NTND  Extremities - No C/C/E, No calf tenderness   Skin-no rash  Wounds-none      Neurologic Exam -Awake, Alert, AAO to self, place, date, year, situation, dysarthria, dysmetria, poor balance.      Psychiatric - Mood stable, Affect WNL       FUNCTIONAL PROGRESS  Gait - 120ft RW CG-min A  ADLs - CG  Transfers -CG  Functional transfer - CG    RECENT LABS                        15.9   7.83  )-----------( 246      ( 06 Dec 2019 05:35 )             45.3     12-06    140  |  103  |  23  ----------------------------<  95  3.8   |  25  |  1.29    Ca    9.1      06 Dec 2019 05:35    TPro  8.1  /  Alb  3.1<L>  /  TBili  0.8  /  DBili  x   /  AST  19  /  ALT  34  /  AlkPhos  88  12-04      LIVER FUNCTIONS - ( 04 Dec 2019 09:28 )  Alb: 3.1 g/dL / Pro: 8.1 g/dL / ALK PHOS: 88 U/L / ALT: 34 U/L / AST: 19 U/L / GGT: x             Direct LDL: 239 mg/dL (11-15-19 @ 08:31)    Hemoglobin A1C, Whole Blood: 9.5 % (11-15-19 @ 08:34)              RADIOLOGY/OTHER RESULTS      CURRENT MEDICATIONS  MEDICATIONS  (STANDING):  amLODIPine   Tablet 10 milliGRAM(s) Oral at bedtime  aspirin  chewable 81 milliGRAM(s) Oral daily  atorvastatin 80 milliGRAM(s) Oral at bedtime  dextrose 5%. 1000 milliLiter(s) (50 mL/Hr) IV Continuous <Continuous>  dextrose 50% Injectable 12.5 Gram(s) IV Push once  dextrose 50% Injectable 25 Gram(s) IV Push once  dextrose 50% Injectable 25 Gram(s) IV Push once  enoxaparin Injectable 40 milliGRAM(s) SubCutaneous daily  insulin glargine Injectable (LANTUS) 26 Unit(s) SubCutaneous at bedtime  insulin lispro (HumaLOG) corrective regimen sliding scale   SubCutaneous three times a day before meals  insulin lispro (HumaLOG) corrective regimen sliding scale   SubCutaneous at bedtime  pantoprazole    Tablet 40 milliGRAM(s) Oral before breakfast    MEDICATIONS  (PRN):  dextrose 40% Gel 15 Gram(s) Oral once PRN Blood Glucose LESS THAN 70 milliGRAM(s)/deciliter  glucagon  Injectable 1 milliGRAM(s) IntraMuscular once PRN Glucose LESS THAN 70 milligrams/deciliter  ondansetron    Tablet 4 milliGRAM(s) Oral two times a day PRN Nausea and/or Vomiting      ASSESSMENT & PLAN      GI/Bowel Management - prn   Management - Toilet Q2  Skin - Turn Q2  Pain - Tylenol PRN  DVT PPX - lantus  Diet - consistent carbs    Continue comprehensive acute rehab program consisting of 3hrs/day of OT/PT and SLP.

## 2019-12-06 NOTE — PROGRESS NOTE ADULT - SUBJECTIVE AND OBJECTIVE BOX
No distress    Vital Signs Last 24 Hrs  T(C): 36.5 (12-06-19 @ 09:11), Max: 37.1 (12-05-19 @ 21:10)  T(F): 97.7 (12-06-19 @ 09:11), Max: 98.8 (12-05-19 @ 21:10)  HR: 73 (12-06-19 @ 09:11) (73 - 90)  BP: 139/88 (12-06-19 @ 09:11) (128/88 - 139/88)  RR: 16 (12-06-19 @ 09:11) (16 - 16)  SpO2: 97% (12-06-19 @ 09:11) (96% - 97%)    Card S1S2  Lungs b/l air entry  Abd soft, NT, ND  Extr no edema                                                      15.9   7.83  )-----------( 246      ( 06 Dec 2019 05:35 )             45.3     06 Dec 2019 05:35    140    |  103    |  23     ----------------------------<  95     3.8     |  25     |  1.29     Ca    9.1        06 Dec 2019 05:35    amLODIPine   Tablet 10 milliGRAM(s) Oral at bedtime  aspirin  chewable 81 milliGRAM(s) Oral daily  atorvastatin 80 milliGRAM(s) Oral at bedtime  dextrose 40% Gel 15 Gram(s) Oral once PRN  dextrose 5%. 1000 milliLiter(s) IV Continuous <Continuous>  dextrose 50% Injectable 12.5 Gram(s) IV Push once  dextrose 50% Injectable 25 Gram(s) IV Push once  dextrose 50% Injectable 25 Gram(s) IV Push once  enoxaparin Injectable 40 milliGRAM(s) SubCutaneous daily  glucagon  Injectable 1 milliGRAM(s) IntraMuscular once PRN  insulin glargine Injectable (LANTUS) 26 Unit(s) SubCutaneous at bedtime  insulin lispro (HumaLOG) corrective regimen sliding scale   SubCutaneous three times a day before meals  insulin lispro (HumaLOG) corrective regimen sliding scale   SubCutaneous at bedtime  ondansetron    Tablet 4 milliGRAM(s) Oral two times a day PRN  pantoprazole    Tablet 40 milliGRAM(s) Oral before breakfast    A/P:    Pontine CVA, dizziness resolving  HTN controlled on Norvasc 10mg QHS   Stable renal fx  Will follow prn

## 2019-12-06 NOTE — PROGRESS NOTE ADULT - ASSESSMENT
55 yo male s/p right sided cerebellar CVA with unsteady gait and functional deficits admitted to rehab [facility]    #CVA  -on PT/OT/SLP program, fall precautions. Lovenox for DVT ppx. Discharge planning in progress.   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   -ASA 81mg/Lipitor 80mg. Follow LFTs on high dose statin.     #HTN  -renal evaluation completed, BP regimen adjusted. Monitor BP closely.     #Dizziness w/ vertigo  - vertigo improving, no nausea. Meclizine stopped. Zofran prn. No nausea, no vomiting reported. Fall precautions.       #DM2  -Humalog scale to medium coverage, FS ACHS, diabetic education for A1C>9%. Consistent carbs/DASH diet. Evaluated by medicine- Lantus continues home. Follow BG.   -Start diabetes teaching     #Otitis Media:  - Right ear fullness improved. Medicine evaluation requested. Warm compress to right ear.     #YANETH  -creatinine levels fluctuating, encourage PO fluids. Renal in.

## 2019-12-07 LAB
GLUCOSE BLDC GLUCOMTR-MCNC: 162 MG/DL — HIGH (ref 70–99)
GLUCOSE BLDC GLUCOMTR-MCNC: 187 MG/DL — HIGH (ref 70–99)
GLUCOSE BLDC GLUCOMTR-MCNC: 208 MG/DL — HIGH (ref 70–99)

## 2019-12-07 PROCEDURE — 99232 SBSQ HOSP IP/OBS MODERATE 35: CPT

## 2019-12-07 RX ADMIN — PANTOPRAZOLE SODIUM 40 MILLIGRAM(S): 20 TABLET, DELAYED RELEASE ORAL at 05:57

## 2019-12-07 RX ADMIN — ATORVASTATIN CALCIUM 80 MILLIGRAM(S): 80 TABLET, FILM COATED ORAL at 21:35

## 2019-12-07 RX ADMIN — Medication 4: at 16:30

## 2019-12-07 RX ADMIN — Medication 2: at 11:31

## 2019-12-07 RX ADMIN — ENOXAPARIN SODIUM 40 MILLIGRAM(S): 100 INJECTION SUBCUTANEOUS at 11:31

## 2019-12-07 RX ADMIN — AMLODIPINE BESYLATE 10 MILLIGRAM(S): 2.5 TABLET ORAL at 21:35

## 2019-12-07 RX ADMIN — Medication 81 MILLIGRAM(S): at 11:31

## 2019-12-07 RX ADMIN — INSULIN GLARGINE 26 UNIT(S): 100 INJECTION, SOLUTION SUBCUTANEOUS at 21:36

## 2019-12-07 NOTE — PROGRESS NOTE ADULT - SUBJECTIVE AND OBJECTIVE BOX
HPI:  55 yo male with PMHx of HTN presents to [facility] ED with complaint of dizziness/vertigo and unsteady gait. As per pt report intermittent dizziness lasted for 2 days and progressed to constant dizziness/unable to stand without holding unto objects. Also reported two episodes of vomiting days prior, prompting urgent care evaluation. Patient was given Meclizine at the urgent care and was advised ED eval if symptoms persisted. CT head on admission shows no acute hemorrhage or mass effect. 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 Neurology evaluation requested. EKG NSR. ASA 325mg started.   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 TTE WNL. Symptoms continued to worsen in spite of permissive HTN and repeat CT was performed ruling out hemorrhagic conversion. HbA1c 9.5%. .   *Augmentin x7 days with Debrox gtts for Otitis Media. (21 Nov 2019 14:58)      Subjective  no new complaints        PAST MEDICAL & SURGICAL HISTORY:  HTN (hypertension)  No significant past surgical history      MedsMEDICATIONS  (STANDING):  amLODIPine   Tablet 10 milliGRAM(s) Oral at bedtime  aspirin  chewable 81 milliGRAM(s) Oral daily  atorvastatin 80 milliGRAM(s) Oral at bedtime  dextrose 5%. 1000 milliLiter(s) (50 mL/Hr) IV Continuous <Continuous>  dextrose 50% Injectable 12.5 Gram(s) IV Push once  dextrose 50% Injectable 25 Gram(s) IV Push once  dextrose 50% Injectable 25 Gram(s) IV Push once  enoxaparin Injectable 40 milliGRAM(s) SubCutaneous daily  insulin glargine Injectable (LANTUS) 26 Unit(s) SubCutaneous at bedtime  insulin lispro (HumaLOG) corrective regimen sliding scale   SubCutaneous three times a day before meals  insulin lispro (HumaLOG) corrective regimen sliding scale   SubCutaneous at bedtime  pantoprazole    Tablet 40 milliGRAM(s) Oral before breakfast    MEDICATIONS  (PRN):  dextrose 40% Gel 15 Gram(s) Oral once PRN Blood Glucose LESS THAN 70 milliGRAM(s)/deciliter  glucagon  Injectable 1 milliGRAM(s) IntraMuscular once PRN Glucose LESS THAN 70 milligrams/deciliter  ondansetron    Tablet 4 milliGRAM(s) Oral two times a day PRN Nausea and/or Vomiting      Vital Signs Last 24 Hrs  T(C): 36.3 (07 Dec 2019 07:55), Max: 36.7 (06 Dec 2019 19:43)  T(F): 97.3 (07 Dec 2019 07:55), Max: 98.1 (06 Dec 2019 19:43)  HR: 77 (07 Dec 2019 07:55) (77 - 78)  BP: 122/88 (07 Dec 2019 07:55) (122/88 - 142/87)  BP(mean): --  RR: 15 (07 Dec 2019 07:55) (15 - 16)  SpO2: 96% (07 Dec 2019 07:55) (95% - 96%)  I&O's Summary    06 Dec 2019 07:01  -  07 Dec 2019 07:00  --------------------------------------------------------  IN: 600 mL / OUT: 600 mL / NET: 0 mL    07 Dec 2019 07:01  -  07 Dec 2019 14:16  --------------------------------------------------------  IN: 780 mL / OUT: 0 mL / NET: 780 mL        PHYSICAL EXAM:  GENERAL: NAD  NECK: Supple  NERVOUS SYSTEM:  awake and alert  HEART: S1s2 NL , RRR  CHEST/LUNG: Clear to percussion bilaterally  ABDOMEN: Soft, Nontender, Nondistended; Bowel sounds present  EXTREMITIES:  No edema      LABS:  |12-06-19 @ 05:35            15.9  7.83>--------------<246            45.3      140  |  103  |  23  --------------------------<95  3.8  |  9.1  |  1.29    Mg -- / Phos--    PT -- / INR --    PTT --    Lipase --  12-06-19 @ 05:35    Imaging Personally Reviewed:  [ ] YES  [ ] NO    CAPILLARY BLOOD GLUCOSE      POCT Blood Glucose.: 187 mg/dL (07 Dec 2019 11:28)  POCT Blood Glucose.: 143 mg/dL (06 Dec 2019 22:21)  POCT Blood Glucose.: 178 mg/dL (06 Dec 2019 16:45)    HEALTH ISSUES - PROBLEM Dx:    CVA  ASA/STatin  PT/OT per rehab    HTN  Norvasc    DM2, A1C 9.5%  Lantus/Humalog    DVT ppx  Lovenox        Care Discussed with Consultants/Other Providers [ x] YES  [ ] NO

## 2019-12-07 NOTE — PROGRESS NOTE ADULT - SUBJECTIVE AND OBJECTIVE BOX
Progress note    Subjective  No new complaints at this time.  No acute events overnight.  Patient tolerating therapy well.  Denies chest pain, shortness of breath, palpitations, nausea, vomiting, or fevers.      PAST MEDICAL & SURGICAL HISTORY:  HTN (hypertension)  No significant past surgical history       REVIEW OF SYMPTOMS  [X] Constitutional WNL            [X] Resp WNL           [X] GI WNL                       [X]  WNL                [X] Skin WNL                 [X] MSK WNL                   [X] Psych WNL      VITALS  Vital Signs Last 24 Hrs  T(C): 37.1 (05 Dec 2019 21:10), Max: 37.1 (05 Dec 2019 21:10)  T(F): 98.8 (05 Dec 2019 21:10), Max: 98.8 (05 Dec 2019 21:10)  HR: 90 (05 Dec 2019 21:10) (90 - 90)  BP: 128/88 (05 Dec 2019 21:10) (128/88 - 128/88)  BP(mean): --  RR: 16 (05 Dec 2019 21:10) (16 - 16)  SpO2: 96% (05 Dec 2019 21:10) (96% - 96%)      PHYSICAL EXAM  Constitutional - NAD, sitting comfortably in bed  HEENT - NCAT, EOMI  Neck - Supple, No limited ROM  Chest - No wheeze, No rhonchi, No crackles  Cardiovascular - RRR, S1S2, No murmurs  Abdomen - BS+, Soft, NTND  Extremities - No C/C/E, No calf tenderness   Skin-no rash  Wounds-none      Neurologic Exam -Awake, Alert,      Psychiatric - Mood stable, Affect WNL       FUNCTIONAL PROGRESS  Gait - 120ft RW CG-min A  ADLs - CG  Transfers -CG  Functional transfer - CG    RECENT LABS                        15.9   7.83  )-----------( 246      ( 06 Dec 2019 05:35 )             45.3     12-06    140  |  103  |  23  ----------------------------<  95  3.8   |  25  |  1.29    Ca    9.1      06 Dec 2019 05:35    TPro  8.1  /  Alb  3.1<L>  /  TBili  0.8  /  DBili  x   /  AST  19  /  ALT  34  /  AlkPhos  88  12-04      LIVER FUNCTIONS - ( 04 Dec 2019 09:28 )  Alb: 3.1 g/dL / Pro: 8.1 g/dL / ALK PHOS: 88 U/L / ALT: 34 U/L / AST: 19 U/L / GGT: x             Direct LDL: 239 mg/dL (11-15-19 @ 08:31)    Hemoglobin A1C, Whole Blood: 9.5 % (11-15-19 @ 08:34)              RADIOLOGY/OTHER RESULTS      CURRENT MEDICATIONS  MEDICATIONS  (STANDING):  amLODIPine   Tablet 10 milliGRAM(s) Oral at bedtime  aspirin  chewable 81 milliGRAM(s) Oral daily  atorvastatin 80 milliGRAM(s) Oral at bedtime  dextrose 5%. 1000 milliLiter(s) (50 mL/Hr) IV Continuous <Continuous>  dextrose 50% Injectable 12.5 Gram(s) IV Push once  dextrose 50% Injectable 25 Gram(s) IV Push once  dextrose 50% Injectable 25 Gram(s) IV Push once  enoxaparin Injectable 40 milliGRAM(s) SubCutaneous daily  insulin glargine Injectable (LANTUS) 26 Unit(s) SubCutaneous at bedtime  insulin lispro (HumaLOG) corrective regimen sliding scale   SubCutaneous three times a day before meals  insulin lispro (HumaLOG) corrective regimen sliding scale   SubCutaneous at bedtime  pantoprazole    Tablet 40 milliGRAM(s) Oral before breakfast    MEDICATIONS  (PRN):  dextrose 40% Gel 15 Gram(s) Oral once PRN Blood Glucose LESS THAN 70 milliGRAM(s)/deciliter  glucagon  Injectable 1 milliGRAM(s) IntraMuscular once PRN Glucose LESS THAN 70 milligrams/deciliter  ondansetron    Tablet 4 milliGRAM(s) Oral two times a day PRN Nausea and/or Vomiting      ASSESSMENT & PLAN      GI/Bowel Management - prn   Management - Toilet Q2  Skin - Turn Q2  Pain - Tylenol PRN  DVT PPX - lantus  Diet - consistent carbs    Continue comprehensive acute rehab program consisting of 3hrs/day of OT/PT and SLP.

## 2019-12-07 NOTE — PROGRESS NOTE ADULT - ASSESSMENT
53 yo male s/p right sided cerebellar CVA with unsteady gait and functional deficits admitted to rehab [facility]    #CVA  -on PT/OT/SLP program, fall precautions. Lovenox for DVT ppx. Discharge planning in progress.   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   -ASA 81mg/Lipitor 80mg. Follow LFTs on high dose statin.     #HTN  -renal evaluation completed, BP regimen adjusted. Monitor BP closely.     #Dizziness w/ vertigo  - vertigo improving, no nausea. Meclizine stopped. Zofran prn. No nausea, no vomiting reported. Fall precautions.       #DM2  -Humalog scale to medium coverage, FS ACHS, diabetic education for A1C>9%. Consistent carbs/DASH diet. Evaluated by medicine- Lantus continues home. Follow BG.   -Cont. diabetes teaching     #Otitis Media:  - Right ear fullness improved. Medicine evaluation requested. Warm compress to right ear.     #YANETH  -creatinine levels fluctuating, encourage PO fluids. Renal in.

## 2019-12-08 LAB
GLUCOSE BLDC GLUCOMTR-MCNC: 140 MG/DL — HIGH (ref 70–99)
GLUCOSE BLDC GLUCOMTR-MCNC: 151 MG/DL — HIGH (ref 70–99)
GLUCOSE BLDC GLUCOMTR-MCNC: 177 MG/DL — HIGH (ref 70–99)
GLUCOSE BLDC GLUCOMTR-MCNC: 183 MG/DL — HIGH (ref 70–99)

## 2019-12-08 PROCEDURE — 99232 SBSQ HOSP IP/OBS MODERATE 35: CPT

## 2019-12-08 RX ADMIN — PANTOPRAZOLE SODIUM 40 MILLIGRAM(S): 20 TABLET, DELAYED RELEASE ORAL at 06:01

## 2019-12-08 RX ADMIN — INSULIN GLARGINE 26 UNIT(S): 100 INJECTION, SOLUTION SUBCUTANEOUS at 22:24

## 2019-12-08 RX ADMIN — AMLODIPINE BESYLATE 10 MILLIGRAM(S): 2.5 TABLET ORAL at 21:25

## 2019-12-08 RX ADMIN — ATORVASTATIN CALCIUM 80 MILLIGRAM(S): 80 TABLET, FILM COATED ORAL at 21:24

## 2019-12-08 RX ADMIN — Medication 81 MILLIGRAM(S): at 11:46

## 2019-12-08 RX ADMIN — Medication 2: at 07:42

## 2019-12-08 RX ADMIN — ENOXAPARIN SODIUM 40 MILLIGRAM(S): 100 INJECTION SUBCUTANEOUS at 11:46

## 2019-12-08 RX ADMIN — Medication 2: at 11:46

## 2019-12-08 RX ADMIN — Medication 0: at 21:25

## 2019-12-08 NOTE — PROGRESS NOTE ADULT - SUBJECTIVE AND OBJECTIVE BOX
HPI:  53 yo male with PMHx of HTN presents to [facility] ED with complaint of dizziness/vertigo and unsteady gait. As per pt report intermittent dizziness lasted for 2 days and progressed to constant dizziness/unable to stand without holding unto objects. Also reported two episodes of vomiting days prior, prompting urgent care evaluation. Patient was given Meclizine at the urgent care and was advised ED eval if symptoms persisted. CT head on admission shows no acute hemorrhage or mass effect. 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 Neurology evaluation requested. EKG NSR. ASA 325mg started.   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 TTE WNL. Symptoms continued to worsen in spite of permissive HTN and repeat CT was performed ruling out hemorrhagic conversion. HbA1c 9.5%. .   *Augmentin x7 days with Debrox gtts for Otitis Media. (21 Nov 2019 14:58)      Subjective  Doing well. No complaints.         PAST MEDICAL & SURGICAL HISTORY:  HTN (hypertension)  No significant past surgical history      MedsMEDICATIONS  (STANDING):  amLODIPine   Tablet 10 milliGRAM(s) Oral at bedtime  aspirin  chewable 81 milliGRAM(s) Oral daily  atorvastatin 80 milliGRAM(s) Oral at bedtime  dextrose 5%. 1000 milliLiter(s) (50 mL/Hr) IV Continuous <Continuous>  dextrose 50% Injectable 12.5 Gram(s) IV Push once  dextrose 50% Injectable 25 Gram(s) IV Push once  dextrose 50% Injectable 25 Gram(s) IV Push once  enoxaparin Injectable 40 milliGRAM(s) SubCutaneous daily  insulin glargine Injectable (LANTUS) 26 Unit(s) SubCutaneous at bedtime  insulin lispro (HumaLOG) corrective regimen sliding scale   SubCutaneous three times a day before meals  insulin lispro (HumaLOG) corrective regimen sliding scale   SubCutaneous at bedtime  pantoprazole    Tablet 40 milliGRAM(s) Oral before breakfast    MEDICATIONS  (PRN):  dextrose 40% Gel 15 Gram(s) Oral once PRN Blood Glucose LESS THAN 70 milliGRAM(s)/deciliter  glucagon  Injectable 1 milliGRAM(s) IntraMuscular once PRN Glucose LESS THAN 70 milligrams/deciliter  ondansetron    Tablet 4 milliGRAM(s) Oral two times a day PRN Nausea and/or Vomiting      Vital Signs Last 24 Hrs  T(C): 36.6 (08 Dec 2019 09:34), Max: 36.7 (07 Dec 2019 20:08)  T(F): 97.9 (08 Dec 2019 09:34), Max: 98 (07 Dec 2019 20:08)  HR: 70 (08 Dec 2019 09:34) (70 - 77)  BP: 125/87 (08 Dec 2019 09:34) (125/87 - 134/89)  BP(mean): --  RR: 16 (08 Dec 2019 09:34) (14 - 16)  SpO2: 95% (08 Dec 2019 09:34) (95% - 95%)  I&O's Summary    07 Dec 2019 07:01  -  08 Dec 2019 07:00  --------------------------------------------------------  IN: 780 mL / OUT: 0 mL / NET: 780 mL        PHYSICAL EXAM:  GENERAL: NAD  NECK: Supple  NERVOUS SYSTEM:  awake and alert  HEART: S1s2 NL , RRR  CHEST/LUNG: Clear to percussion bilaterally  ABDOMEN: Soft, Nontender, Nondistended; Bowel sounds present  EXTREMITIES:  No edema      LABS:  |12-06-19 @ 05:35            15.9  7.83>--------------<246            45.3      140  |  103  |  23  --------------------------<95  3.8  |  9.1  |  1.29    Mg -- / Phos--    PT -- / INR --    PTT --    Lipase --  12-06-19 @ 05:35    Imaging Personally Reviewed:  [ ] YES  [ ] NO  CAPILLARY BLOOD GLUCOSE      POCT Blood Glucose.: 151 mg/dL (08 Dec 2019 07:40)  POCT Blood Glucose.: 162 mg/dL (07 Dec 2019 21:35)  POCT Blood Glucose.: 208 mg/dL (07 Dec 2019 16:28)  POCT Blood Glucose.: 187 mg/dL (07 Dec 2019 11:28)      HEALTH ISSUES - PROBLEM Dx:  CVA  ASA/STatin  PT/OT per rehab    HTN  Norvasc    DM2, A1C 9.5%  Lantus/Humalog    DVT ppx  Lovenox        Care Discussed with Consultants/Other Providers [ x] YES  [ ] NO

## 2019-12-08 NOTE — PROGRESS NOTE ADULT - SUBJECTIVE AND OBJECTIVE BOX
Progress note    Subjective  No acute events overnight  Patient reports therapy is going well.  Denies chest pain, shortness of breath, palpitations, nausea, vomiting, or fevers.      PAST MEDICAL & SURGICAL HISTORY:  HTN (hypertension)  No significant past surgical history       REVIEW OF SYMPTOMS  [X] Constitutional WNL            [X] Resp WNL           [X] GI WNL                       [X]  WNL                [X] Skin WNL                 [X] MSK WNL                   [X] Psych WNL      VITALS  Vital Signs Last 24 Hrs  T(C): 37.1 (05 Dec 2019 21:10), Max: 37.1 (05 Dec 2019 21:10)  T(F): 98.8 (05 Dec 2019 21:10), Max: 98.8 (05 Dec 2019 21:10)  HR: 90 (05 Dec 2019 21:10) (90 - 90)  BP: 128/88 (05 Dec 2019 21:10) (128/88 - 128/88)  BP(mean): --  RR: 16 (05 Dec 2019 21:10) (16 - 16)  SpO2: 96% (05 Dec 2019 21:10) (96% - 96%)      PHYSICAL EXAM  Constitutional - NAD, sitting comfortably in bed  HEENT - NCAT, EOMI  Neck - Supple, No limited ROM  Chest - No wheeze, No rhonchi, No crackles  Cardiovascular - RRR, S1S2, No murmurs  Abdomen - BS+, Soft, NTND  Extremities - No C/C/E, No calf tenderness   Skin-no rash  Wounds-none      Neurologic Exam -Awake, Alert,      Psychiatric - Mood stable, Affect WNL, similar to previous exams       FUNCTIONAL PROGRESS  Gait - 120ft RW CG-min A  ADLs - CG  Transfers -CG  Functional transfer - CG    RECENT LABS                        15.9   7.83  )-----------( 246      ( 06 Dec 2019 05:35 )             45.3     12-06    140  |  103  |  23  ----------------------------<  95  3.8   |  25  |  1.29    Ca    9.1      06 Dec 2019 05:35    TPro  8.1  /  Alb  3.1<L>  /  TBili  0.8  /  DBili  x   /  AST  19  /  ALT  34  /  AlkPhos  88  12-04      LIVER FUNCTIONS - ( 04 Dec 2019 09:28 )  Alb: 3.1 g/dL / Pro: 8.1 g/dL / ALK PHOS: 88 U/L / ALT: 34 U/L / AST: 19 U/L / GGT: x             Direct LDL: 239 mg/dL (11-15-19 @ 08:31)    Hemoglobin A1C, Whole Blood: 9.5 % (11-15-19 @ 08:34)              RADIOLOGY/OTHER RESULTS      CURRENT MEDICATIONS  MEDICATIONS  (STANDING):  amLODIPine   Tablet 10 milliGRAM(s) Oral at bedtime  aspirin  chewable 81 milliGRAM(s) Oral daily  atorvastatin 80 milliGRAM(s) Oral at bedtime  dextrose 5%. 1000 milliLiter(s) (50 mL/Hr) IV Continuous <Continuous>  dextrose 50% Injectable 12.5 Gram(s) IV Push once  dextrose 50% Injectable 25 Gram(s) IV Push once  dextrose 50% Injectable 25 Gram(s) IV Push once  enoxaparin Injectable 40 milliGRAM(s) SubCutaneous daily  insulin glargine Injectable (LANTUS) 26 Unit(s) SubCutaneous at bedtime  insulin lispro (HumaLOG) corrective regimen sliding scale   SubCutaneous three times a day before meals  insulin lispro (HumaLOG) corrective regimen sliding scale   SubCutaneous at bedtime  pantoprazole    Tablet 40 milliGRAM(s) Oral before breakfast    MEDICATIONS  (PRN):  dextrose 40% Gel 15 Gram(s) Oral once PRN Blood Glucose LESS THAN 70 milliGRAM(s)/deciliter  glucagon  Injectable 1 milliGRAM(s) IntraMuscular once PRN Glucose LESS THAN 70 milligrams/deciliter  ondansetron    Tablet 4 milliGRAM(s) Oral two times a day PRN Nausea and/or Vomiting      ASSESSMENT & PLAN      GI/Bowel Management - prn   Management - Toilet Q2  Skin - Turn Q2  Pain - Tylenol PRN  DVT PPX - lantus  Diet - consistent carbs    Continue comprehensive acute rehab program consisting of 3hrs/day of OT/PT and SLP.

## 2019-12-09 LAB
ALBUMIN SERPL ELPH-MCNC: 3.1 G/DL — LOW (ref 3.3–5)
ALP SERPL-CCNC: 82 U/L — SIGNIFICANT CHANGE UP (ref 40–120)
ALT FLD-CCNC: 35 U/L — SIGNIFICANT CHANGE UP (ref 10–45)
ANION GAP SERPL CALC-SCNC: 11 MMOL/L — SIGNIFICANT CHANGE UP (ref 5–17)
AST SERPL-CCNC: 25 U/L — SIGNIFICANT CHANGE UP (ref 10–40)
BILIRUB SERPL-MCNC: 0.5 MG/DL — SIGNIFICANT CHANGE UP (ref 0.2–1.2)
BUN SERPL-MCNC: 28 MG/DL — HIGH (ref 7–23)
CALCIUM SERPL-MCNC: 9.3 MG/DL — SIGNIFICANT CHANGE UP (ref 8.4–10.5)
CHLORIDE SERPL-SCNC: 102 MMOL/L — SIGNIFICANT CHANGE UP (ref 96–108)
CO2 SERPL-SCNC: 26 MMOL/L — SIGNIFICANT CHANGE UP (ref 22–31)
CREAT SERPL-MCNC: 1.4 MG/DL — HIGH (ref 0.5–1.3)
GLUCOSE BLDC GLUCOMTR-MCNC: 115 MG/DL — HIGH (ref 70–99)
GLUCOSE BLDC GLUCOMTR-MCNC: 166 MG/DL — HIGH (ref 70–99)
GLUCOSE BLDC GLUCOMTR-MCNC: 182 MG/DL — HIGH (ref 70–99)
GLUCOSE BLDC GLUCOMTR-MCNC: 233 MG/DL — HIGH (ref 70–99)
GLUCOSE SERPL-MCNC: 113 MG/DL — HIGH (ref 70–99)
HCT VFR BLD CALC: 49.5 % — SIGNIFICANT CHANGE UP (ref 39–50)
HGB BLD-MCNC: 16.9 G/DL — SIGNIFICANT CHANGE UP (ref 13–17)
MCHC RBC-ENTMCNC: 32.5 PG — SIGNIFICANT CHANGE UP (ref 27–34)
MCHC RBC-ENTMCNC: 34.1 GM/DL — SIGNIFICANT CHANGE UP (ref 32–36)
MCV RBC AUTO: 95.2 FL — SIGNIFICANT CHANGE UP (ref 80–100)
NRBC # BLD: 0 /100 WBCS — SIGNIFICANT CHANGE UP (ref 0–0)
PLATELET # BLD AUTO: 256 K/UL — SIGNIFICANT CHANGE UP (ref 150–400)
POTASSIUM SERPL-MCNC: 3.8 MMOL/L — SIGNIFICANT CHANGE UP (ref 3.5–5.3)
POTASSIUM SERPL-SCNC: 3.8 MMOL/L — SIGNIFICANT CHANGE UP (ref 3.5–5.3)
PROT SERPL-MCNC: 8 G/DL — SIGNIFICANT CHANGE UP (ref 6–8.3)
RBC # BLD: 5.2 M/UL — SIGNIFICANT CHANGE UP (ref 4.2–5.8)
RBC # FLD: 11.4 % — SIGNIFICANT CHANGE UP (ref 10.3–14.5)
SODIUM SERPL-SCNC: 139 MMOL/L — SIGNIFICANT CHANGE UP (ref 135–145)
WBC # BLD: 7.34 K/UL — SIGNIFICANT CHANGE UP (ref 3.8–10.5)
WBC # FLD AUTO: 7.34 K/UL — SIGNIFICANT CHANGE UP (ref 3.8–10.5)

## 2019-12-09 PROCEDURE — 99233 SBSQ HOSP IP/OBS HIGH 50: CPT

## 2019-12-09 RX ADMIN — Medication 4: at 11:38

## 2019-12-09 RX ADMIN — Medication 2: at 16:47

## 2019-12-09 RX ADMIN — Medication 81 MILLIGRAM(S): at 11:37

## 2019-12-09 RX ADMIN — INSULIN GLARGINE 26 UNIT(S): 100 INJECTION, SOLUTION SUBCUTANEOUS at 21:35

## 2019-12-09 RX ADMIN — ENOXAPARIN SODIUM 40 MILLIGRAM(S): 100 INJECTION SUBCUTANEOUS at 11:37

## 2019-12-09 RX ADMIN — AMLODIPINE BESYLATE 10 MILLIGRAM(S): 2.5 TABLET ORAL at 21:35

## 2019-12-09 RX ADMIN — PANTOPRAZOLE SODIUM 40 MILLIGRAM(S): 20 TABLET, DELAYED RELEASE ORAL at 05:56

## 2019-12-09 RX ADMIN — ATORVASTATIN CALCIUM 80 MILLIGRAM(S): 80 TABLET, FILM COATED ORAL at 21:35

## 2019-12-09 NOTE — PROGRESS NOTE ADULT - ASSESSMENT
53 yo male s/p right sided cerebellar CVA with unsteady gait and functional deficits admitted to rehab [facility]    #CVA  -on PT/OT/SLP program, fall precautions. Lovenox for DVT ppx. Discharge planning in progress.   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   -ASA 81mg/Lipitor 80mg. Follow LFTs on high dose statin.     #HTN  -renal evaluation completed, BP regimen adjusted. Monitor BP closely.     #Dizziness w/ vertigo  - vertigo improving, no nausea. Meclizine stopped. Zofran prn. No nausea, no vomiting reported. Fall precautions.       #DM2  -Humalog scale to medium coverage, FS ACHS, diabetic education for A1C>9%. Consistent carbs/DASH diet. Evaluated by medicine- Lantus continues home. Follow BG.   -Start diabetes teaching     #Otitis Media:  - Right ear fullness improved. Medicine evaluation requested. Warm compress to right ear.     #YANETH  -creatinine levels fluctuating, encourage PO fluids. Renal in.

## 2019-12-09 NOTE — PROGRESS NOTE ADULT - SUBJECTIVE AND OBJECTIVE BOX
Rehab progress    CHIEF COMPLAINT: Improving balance and vertigo.     HISTORY OF PRESENT ILLNESS  53 yo male with PMHx of HTN presents to [facility] ED with complaint of dizziness/vertigo and unsteady gait. As per pt report intermittent dizziness lasted for 2 days and progressed to constant dizziness/unable to stand without holding unto objects. Also reported two episodes of vomiting days prior, prompting urgent care evaluation. Patient was given Meclizine at the urgent care and was advised ED eval if symptoms persisted. CT head on admission shows no acute hemorrhage or mass effect. 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 Neurology evaluation requested. EKG NSR. ASA 325mg started.   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 TTE WNL. Symptoms continued to worsen in spite of permissive HTN and repeat CT was performed ruling out hemorrhagic conversion. HbA1c 9.5%. .   *Augmentin x7 days with Debrox gtts for Otitis Media. (21 Nov 2019 14:58)      PAST MEDICAL & SURGICAL HISTORY:  HTN (hypertension)  No significant past surgical history       REVIEW OF SYMPTOMS  [X] Constitutional WNL          [X] Resp WNL                      [X]  WNL            [X] Endo WNL                     [X] Skin WNL                 [X] MSK WNL            [X] Psych WNL      VITALS  Vital Signs Last 24 Hrs  T(C): 36.8 (08 Dec 2019 21:13), Max: 36.8 (08 Dec 2019 21:13)  T(F): 98.2 (08 Dec 2019 21:13), Max: 98.2 (08 Dec 2019 21:13)  HR: 83 (08 Dec 2019 21:13) (70 - 83)  BP: 133/87 (08 Dec 2019 21:13) (125/87 - 133/87)  BP(mean): --  RR: 15 (08 Dec 2019 21:13) (15 - 16)  SpO2: 94% (08 Dec 2019 21:13) (94% - 95%)      PHYSICAL EXAM  Constitutional - NAD  HEENT - NCAT, EOMI  Neck - Supple, No limited ROM  Chest - CTA bilaterally, No wheeze, No rhonchi, No crackles  Cardiovascular - RRR, S1S2, No murmurs  Abdomen - BS+, Soft, NTND  Extremities - No C/C/E, No calf tenderness   Skin-no rash  Wounds-none      Neurologic Exam - Awake, Alert, improving balance/coordination. Right drift.      FUNCTIONAL PROGRESS  Gait - 120ft RW CG-min A  ADLs - CG  Transfers -CG  Functional transfer - CG      RECENT LABS                        16.9   7.34  )-----------( 256      ( 09 Dec 2019 05:35 )             49.5     12-09    139  |  102  |  28<H>  ----------------------------<  113<H>  3.8   |  26  |  1.40<H>    Ca    9.3      09 Dec 2019 05:35    TPro  8.0  /  Alb  3.1<L>  /  TBili  0.5  /  DBili  x   /  AST  25  /  ALT  35  /  AlkPhos  82  12-09      LIVER FUNCTIONS - ( 09 Dec 2019 05:35 )  Alb: 3.1 g/dL / Pro: 8.0 g/dL / ALK PHOS: 82 U/L / ALT: 35 U/L / AST: 25 U/L / GGT: x             Direct LDL: 239 mg/dL (11-15-19 @ 08:31)    Hemoglobin A1C, Whole Blood: 9.5 % (11-15-19 @ 08:34)      RADIOLOGY/OTHER RESULTS      CURRENT MEDICATIONS  MEDICATIONS  (STANDING):  amLODIPine   Tablet 10 milliGRAM(s) Oral at bedtime  aspirin  chewable 81 milliGRAM(s) Oral daily  atorvastatin 80 milliGRAM(s) Oral at bedtime  dextrose 5%. 1000 milliLiter(s) (50 mL/Hr) IV Continuous <Continuous>  dextrose 50% Injectable 12.5 Gram(s) IV Push once  dextrose 50% Injectable 25 Gram(s) IV Push once  dextrose 50% Injectable 25 Gram(s) IV Push once  enoxaparin Injectable 40 milliGRAM(s) SubCutaneous daily  insulin glargine Injectable (LANTUS) 26 Unit(s) SubCutaneous at bedtime  insulin lispro (HumaLOG) corrective regimen sliding scale   SubCutaneous three times a day before meals  insulin lispro (HumaLOG) corrective regimen sliding scale   SubCutaneous at bedtime  pantoprazole    Tablet 40 milliGRAM(s) Oral before breakfast    MEDICATIONS  (PRN):  dextrose 40% Gel 15 Gram(s) Oral once PRN Blood Glucose LESS THAN 70 milliGRAM(s)/deciliter  glucagon  Injectable 1 milliGRAM(s) IntraMuscular once PRN Glucose LESS THAN 70 milligrams/deciliter  ondansetron    Tablet 4 milliGRAM(s) Oral two times a day PRN Nausea and/or Vomiting      ASSESSMENT & PLAN          GI/Bowel Management - prn   Management - Toilet Q2  Skin - Turn Q2  Pain - Tylenol PRN  DVT PPX - Lovenox sq  Diet - consistent carbs.     Continue comprehensive acute rehab program consisting of 3hrs/day of OT/PT and SLP.

## 2019-12-10 VITALS
OXYGEN SATURATION: 96 % | HEART RATE: 83 BPM | TEMPERATURE: 98 F | RESPIRATION RATE: 16 BRPM | DIASTOLIC BLOOD PRESSURE: 86 MMHG | SYSTOLIC BLOOD PRESSURE: 123 MMHG

## 2019-12-10 LAB
GLUCOSE BLDC GLUCOMTR-MCNC: 132 MG/DL — HIGH (ref 70–99)
GLUCOSE BLDC GLUCOMTR-MCNC: 198 MG/DL — HIGH (ref 70–99)

## 2019-12-10 PROCEDURE — 92523 SPEECH SOUND LANG COMPREHEN: CPT

## 2019-12-10 PROCEDURE — 80048 BASIC METABOLIC PNL TOTAL CA: CPT

## 2019-12-10 PROCEDURE — 97535 SELF CARE MNGMENT TRAINING: CPT

## 2019-12-10 PROCEDURE — 97530 THERAPEUTIC ACTIVITIES: CPT

## 2019-12-10 PROCEDURE — 99239 HOSP IP/OBS DSCHRG MGMT >30: CPT

## 2019-12-10 PROCEDURE — 80053 COMPREHEN METABOLIC PANEL: CPT

## 2019-12-10 PROCEDURE — 92610 EVALUATE SWALLOWING FUNCTION: CPT

## 2019-12-10 PROCEDURE — 85027 COMPLETE CBC AUTOMATED: CPT

## 2019-12-10 PROCEDURE — 97116 GAIT TRAINING THERAPY: CPT

## 2019-12-10 PROCEDURE — 97167 OT EVAL HIGH COMPLEX 60 MIN: CPT

## 2019-12-10 PROCEDURE — 97163 PT EVAL HIGH COMPLEX 45 MIN: CPT

## 2019-12-10 PROCEDURE — 36415 COLL VENOUS BLD VENIPUNCTURE: CPT

## 2019-12-10 PROCEDURE — 82962 GLUCOSE BLOOD TEST: CPT

## 2019-12-10 PROCEDURE — 97112 NEUROMUSCULAR REEDUCATION: CPT

## 2019-12-10 PROCEDURE — 92526 ORAL FUNCTION THERAPY: CPT

## 2019-12-10 PROCEDURE — 97110 THERAPEUTIC EXERCISES: CPT

## 2019-12-10 PROCEDURE — 81001 URINALYSIS AUTO W/SCOPE: CPT

## 2019-12-10 PROCEDURE — 99232 SBSQ HOSP IP/OBS MODERATE 35: CPT

## 2019-12-10 PROCEDURE — 90686 IIV4 VACC NO PRSV 0.5 ML IM: CPT

## 2019-12-10 PROCEDURE — 92507 TX SP LANG VOICE COMM INDIV: CPT

## 2019-12-10 RX ORDER — ATORVASTATIN CALCIUM 80 MG/1
1 TABLET, FILM COATED ORAL
Qty: 0 | Refills: 0 | DISCHARGE
Start: 2019-12-10

## 2019-12-10 RX ORDER — INSULIN LISPRO 100/ML
3 VIAL (ML) SUBCUTANEOUS
Qty: 0 | Refills: 0 | DISCHARGE

## 2019-12-10 RX ORDER — INSULIN LISPRO 100/ML
3 VIAL (ML) SUBCUTANEOUS
Refills: 0 | Status: DISCONTINUED | OUTPATIENT
Start: 2019-12-10 | End: 2019-12-10

## 2019-12-10 RX ORDER — INFLUENZA VIRUS VACCINE 15; 15; 15; 15 UG/.5ML; UG/.5ML; UG/.5ML; UG/.5ML
0.5 SUSPENSION INTRAMUSCULAR ONCE
Refills: 0 | Status: COMPLETED | OUTPATIENT
Start: 2019-12-10 | End: 2019-12-10

## 2019-12-10 RX ORDER — INSULIN GLARGINE 100 [IU]/ML
22 INJECTION, SOLUTION SUBCUTANEOUS
Qty: 0 | Refills: 0 | DISCHARGE
Start: 2019-12-10

## 2019-12-10 RX ORDER — AMLODIPINE BESYLATE 2.5 MG/1
1 TABLET ORAL
Qty: 0 | Refills: 0 | DISCHARGE
Start: 2019-12-10

## 2019-12-10 RX ORDER — PANTOPRAZOLE SODIUM 20 MG/1
1 TABLET, DELAYED RELEASE ORAL
Qty: 0 | Refills: 0 | DISCHARGE
Start: 2019-12-10

## 2019-12-10 RX ORDER — ASPIRIN/CALCIUM CARB/MAGNESIUM 324 MG
1 TABLET ORAL
Qty: 0 | Refills: 0 | DISCHARGE
Start: 2019-12-10

## 2019-12-10 RX ADMIN — Medication 3 UNIT(S): at 11:15

## 2019-12-10 RX ADMIN — PANTOPRAZOLE SODIUM 40 MILLIGRAM(S): 20 TABLET, DELAYED RELEASE ORAL at 06:07

## 2019-12-10 RX ADMIN — Medication 2: at 11:14

## 2019-12-10 RX ADMIN — Medication 81 MILLIGRAM(S): at 11:15

## 2019-12-10 RX ADMIN — ENOXAPARIN SODIUM 40 MILLIGRAM(S): 100 INJECTION SUBCUTANEOUS at 11:15

## 2019-12-10 RX ADMIN — INFLUENZA VIRUS VACCINE 0.5 MILLILITER(S): 15; 15; 15; 15 SUSPENSION INTRAMUSCULAR at 15:23

## 2019-12-10 NOTE — DISCHARGE NOTE NURSING/CASE MANAGEMENT/SOCIAL WORK - NSDCPEPTSTRK_GEN_ALL_CORE
Call 911 for stroke/Stroke education booklet/Stroke support groups for patients, families, and friends/Stroke warning signs and symptoms/Signs and symptoms of stroke/Need for follow up after discharge/Risk factors for stroke/Prescribed medications

## 2019-12-10 NOTE — DISCHARGE NOTE PROVIDER - NSDCACTIVITY_GEN_ALL_CORE
Sex allowed/Do not make important decisions/Stairs allowed/Walking - Indoors allowed/Do not drive or operate machinery/Walking - Outdoors allowed/Showering allowed/No heavy lifting/straining

## 2019-12-10 NOTE — DISCHARGE NOTE PROVIDER - NSDCCPCAREPLAN_GEN_ALL_CORE_FT
PRINCIPAL DISCHARGE DIAGNOSIS  Diagnosis: CVA (cerebrovascular accident)  Assessment and Plan of Treatment: continue Lipitor and ASA, follow up Neurology in 1 week      SECONDARY DISCHARGE DIAGNOSES  Diagnosis: HTN (hypertension)  Assessment and Plan of Treatment: check BP daily and continue Norvasc, follow up PCP in 1-2 weeks.    Diagnosis: CKD (chronic kidney disease), stage III  Assessment and Plan of Treatment: Stay hydrated, follow up with renal in 1-2 weeks.    Diagnosis: DM2 (diabetes mellitus, type 2)  Assessment and Plan of Treatment: Continue insulin at bedtime and with meals, check glucose three times per day. Follow up PCP.

## 2019-12-10 NOTE — PROGRESS NOTE ADULT - ASSESSMENT
53 yo male s/p right sided cerebellar CVA with unsteady gait and functional deficits admitted to rehab [facility]    #CVA  -on PT/OT/SLP program, fall precautions. Lovenox for DVT ppx. Discharge planning to [facility].   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   -ASA 81mg/Lipitor 80mg. Follow LFTs on high dose statin.     #HTN  -renal evaluation completed, BP regimen adjusted. PCP follow up outpt.     #Dizziness w/ vertigo  - vertigo improving, no nausea. Meclizine stopped. Zofran prn. No nausea, no vomiting reported. Fall precautions.       #DM2  -Humalog scale to medium coverage, FS ACHS, diabetic education for A1C>9%. Consistent carbs/DASH diet.     #Otitis Media:  - Right ear fullness improved. Medicine evaluation requested. Warm compress to right ear.     #YANETH  -creatinine levels fluctuating, encourage PO fluids. Renal follow up outpt for CKDIII.

## 2019-12-10 NOTE — DISCHARGE NOTE PROVIDER - NSDCMRMEDTOKEN_GEN_ALL_CORE_FT
amLODIPine 10 mg oral tablet: 1 tab(s) orally once a day (at bedtime)  aspirin 81 mg oral tablet, chewable: 1 tab(s) orally once a day  atorvastatin 80 mg oral tablet: 1 tab(s) orally once a day (at bedtime)  insulin glargine: 22 unit(s) subcutaneous once a day (at bedtime)  insulin lispro 100 units/mL injectable solution: 3 unit(s) subcutaneous 3 times a day (with meals)  ocular lubricant ophthalmic solution: 1 drop(s) to each affected eye 3 times a day  pantoprazole 40 mg oral delayed release tablet: 1 tab(s) orally once a day (before a meal)

## 2019-12-10 NOTE — PROGRESS NOTE ADULT - SUBJECTIVE AND OBJECTIVE BOX
Patient is a 54y old  Male who presents with a chief complaint of s/p right cerebellar CVA with deficits (09 Dec 2019 08:59)      INTERVAL History of Present Illness/OVERNIGHT EVENTS: participating in PT/OT per protocol.     MEDICATIONS  (STANDING):  amLODIPine   Tablet 10 milliGRAM(s) Oral at bedtime  aspirin  chewable 81 milliGRAM(s) Oral daily  atorvastatin 80 milliGRAM(s) Oral at bedtime  dextrose 5%. 1000 milliLiter(s) (50 mL/Hr) IV Continuous <Continuous>  dextrose 50% Injectable 12.5 Gram(s) IV Push once  dextrose 50% Injectable 25 Gram(s) IV Push once  dextrose 50% Injectable 25 Gram(s) IV Push once  enoxaparin Injectable 40 milliGRAM(s) SubCutaneous daily  insulin glargine Injectable (LANTUS) 26 Unit(s) SubCutaneous at bedtime  insulin lispro (HumaLOG) corrective regimen sliding scale   SubCutaneous three times a day before meals  insulin lispro (HumaLOG) corrective regimen sliding scale   SubCutaneous at bedtime  pantoprazole    Tablet 40 milliGRAM(s) Oral before breakfast    MEDICATIONS  (PRN):  dextrose 40% Gel 15 Gram(s) Oral once PRN Blood Glucose LESS THAN 70 milliGRAM(s)/deciliter  glucagon  Injectable 1 milliGRAM(s) IntraMuscular once PRN Glucose LESS THAN 70 milligrams/deciliter  ondansetron    Tablet 4 milliGRAM(s) Oral two times a day PRN Nausea and/or Vomiting      Allergies    No Known Allergies    Intolerances        REVIEW OF SYSTEMS:  Negative unless otherwise specified above.    Vital Signs Last 24 Hrs  T(C): 36.6 (10 Dec 2019 07:26), Max: 36.8 (09 Dec 2019 19:51)  T(F): 97.8 (10 Dec 2019 07:26), Max: 98.3 (09 Dec 2019 19:51)  HR: 83 (10 Dec 2019 07:26) (74 - 83)  BP: 123/86 (10 Dec 2019 07:26) (117/77 - 123/86)  BP(mean): --  RR: 16 (10 Dec 2019 07:26) (16 - 16)  SpO2: 96% (10 Dec 2019 07:26) (96% - 96%)        PHYSICAL EXAM:  GENERAL: No apparent distress, appears stated age  HEAD:  Atraumatic, Normocephalic  EYES: Conjunctiva and sclera clear, no discharge  ENMT: Moist mucous membranes, no nasal discharge  NECK: Supple, no JVD  CHEST/LUNG: Clear to auscultation bilaterally, no wheeze or rales  HEART: Regular rate and rhythm, no murmurs, rubs or gallops  ABDOMEN: Soft, Nontender, Nondistended; Bowel sounds present  EXTREMITIES:  No clubbing, cyanosis or edema  SKIN: No rash or new discoloration  NERVOUS SYSTEM:  Alert & Oriented; Bilateral Lower extremity mobile, sensation to light touch intact      LABS:      Ca    9.3        09 Dec 2019 05:35          CAPILLARY BLOOD GLUCOSE      POCT Blood Glucose.: 132 mg/dL (10 Dec 2019 07:25)  POCT Blood Glucose.: 182 mg/dL (09 Dec 2019 21:32)  POCT Blood Glucose.: 166 mg/dL (09 Dec 2019 16:45)  POCT Blood Glucose.: 233 mg/dL (09 Dec 2019 11:36)      RADIOLOGY & ADDITIONAL TESTS:    Images reviewed personally    Consultant Notes Reviewed and Care Discussed with relevant Consultants/Other Providers.

## 2019-12-10 NOTE — PROGRESS NOTE ADULT - SUBJECTIVE AND OBJECTIVE BOX
Rehab progress    CHIEF COMPLAINT: Poor balance, impaired gait.       HISTORY OF PRESENT ILLNESS  53 yo male with PMHx of HTN presents to [facility] ED with complaint of dizziness/vertigo and unsteady gait. As per pt report intermittent dizziness lasted for 2 days and progressed to constant dizziness/unable to stand without holding unto objects. Also reported two episodes of vomiting days prior, prompting urgent care evaluation. Patient was given Meclizine at the urgent care and was advised ED eval if symptoms persisted. CT head on admission shows no acute hemorrhage or mass effect. 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 Neurology evaluation requested. EKG NSR. ASA 325mg started.   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 TTE WNL. Symptoms continued to worsen in spite of permissive HTN and repeat CT was performed ruling out hemorrhagic conversion. HbA1c 9.5%. .   *Augmentin x7 days with Debrox gtts for Otitis Media. (21 Nov 2019 14:58)      PAST MEDICAL & SURGICAL HISTORY:  HTN (hypertension)  No significant past surgical history       REVIEW OF SYMPTOMS  [X] Constitutional WNL         [X] Resp WNL           [X] GI WNL                          [X]  WNL                   [X] Heme WNL                 [X] Skin WNL                 [X] MSK WNL                  VITALS  Vital Signs Last 24 Hrs  T(C): 36.6 (10 Dec 2019 07:26), Max: 36.8 (09 Dec 2019 19:51)  T(F): 97.8 (10 Dec 2019 07:26), Max: 98.3 (09 Dec 2019 19:51)  HR: 83 (10 Dec 2019 07:26) (74 - 83)  BP: 123/86 (10 Dec 2019 07:26) (117/77 - 123/86)  BP(mean): --  RR: 16 (10 Dec 2019 07:26) (16 - 16)  SpO2: 96% (10 Dec 2019 07:26) (96% - 96%)      PHYSICAL EXAM  Constitutional - NAD, Comfortable  HEENT - NCAT, EOMI  Neck - Supple, No limited ROM  Chest - CTA bilaterally, No wheeze, No rhonchi, No crackles  Cardiovascular - RRR, S1S2, No murmurs  Abdomen - BS+, Soft, NTND  Extremities - No C/C/E, No calf tenderness   Skin-no rash  Woumds-      Neurologic Exam -                   HIF  - Awake, Alert, AAO to self, place, date, year, situation      No aphasia, normal attention, normal concentration, no apraxia, agnosia     Cranial Nerves - CN 2-12 intact     Motor - No focal deficits                            Sensory - Intact to LT,PP,Temprature,JPS, vibration                      Cortical sensory modalities intact     Reflexes - DTR Intact     Toes are flexor     Coordination/dysmetria - FTN intact             Balance - WNL Static and dynamic     Psychiatric - Mood stable, Affect WNL         FUNCTIONAL PROGRESS  Gait -   ADLs -   Transfers -  Functional transfer -     RECENT LABS                        16.9   7.34  )-----------( 256      ( 09 Dec 2019 05:35 )             49.5     12-09    139  |  102  |  28<H>  ----------------------------<  113<H>  3.8   |  26  |  1.40<H>    Ca    9.3      09 Dec 2019 05:35    TPro  8.0  /  Alb  3.1<L>  /  TBili  0.5  /  DBili  x   /  AST  25  /  ALT  35  /  AlkPhos  82  12-09      LIVER FUNCTIONS - ( 09 Dec 2019 05:35 )  Alb: 3.1 g/dL / Pro: 8.0 g/dL / ALK PHOS: 82 U/L / ALT: 35 U/L / AST: 25 U/L / GGT: x             Direct LDL: 239 mg/dL (11-15-19 @ 08:31)    Hemoglobin A1C, Whole Blood: 9.5 % (11-15-19 @ 08:34)              RADIOLOGY/OTHER RESULTS      CURRENT MEDICATIONS  MEDICATIONS  (STANDING):  amLODIPine   Tablet 10 milliGRAM(s) Oral at bedtime  aspirin  chewable 81 milliGRAM(s) Oral daily  atorvastatin 80 milliGRAM(s) Oral at bedtime  dextrose 5%. 1000 milliLiter(s) (50 mL/Hr) IV Continuous <Continuous>  dextrose 50% Injectable 12.5 Gram(s) IV Push once  dextrose 50% Injectable 25 Gram(s) IV Push once  dextrose 50% Injectable 25 Gram(s) IV Push once  enoxaparin Injectable 40 milliGRAM(s) SubCutaneous daily  insulin glargine Injectable (LANTUS) 26 Unit(s) SubCutaneous at bedtime  insulin lispro (HumaLOG) corrective regimen sliding scale   SubCutaneous three times a day before meals  insulin lispro (HumaLOG) corrective regimen sliding scale   SubCutaneous at bedtime  pantoprazole    Tablet 40 milliGRAM(s) Oral before breakfast    MEDICATIONS  (PRN):  dextrose 40% Gel 15 Gram(s) Oral once PRN Blood Glucose LESS THAN 70 milliGRAM(s)/deciliter  glucagon  Injectable 1 milliGRAM(s) IntraMuscular once PRN Glucose LESS THAN 70 milligrams/deciliter  ondansetron    Tablet 4 milliGRAM(s) Oral two times a day PRN Nausea and/or Vomiting      ASSESSMENT & PLAN          GI/Bowel Management - Dulcolax PRN, Fleet PRN   Management - Toilet Q2  Skin - Turn Q2  Pain - Tylenol PRN  DVT PPX - TEDs, SCDs  Diet -     Continue comprehensive acute rehab program consisting of 3hrs/day of OT/PT and SLP. Rehab progress    CHIEF COMPLAINT: Poor balance, impaired gait.       HISTORY OF PRESENT ILLNESS  55 yo male with PMHx of HTN presents to [facility] ED with complaint of dizziness/vertigo and unsteady gait. As per pt report intermittent dizziness lasted for 2 days and progressed to constant dizziness/unable to stand without holding unto objects. Also reported two episodes of vomiting days prior, prompting urgent care evaluation. Patient was given Meclizine at the urgent care and was advised ED eval if symptoms persisted. CT head on admission shows no acute hemorrhage or mass effect. 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 Neurology evaluation requested. EKG NSR. ASA 325mg started.   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 TTE WNL. Symptoms continued to worsen in spite of permissive HTN and repeat CT was performed ruling out hemorrhagic conversion. HbA1c 9.5%. .   *Augmentin x7 days with Debrox gtts for Otitis Media. (21 Nov 2019 14:58)      PAST MEDICAL & SURGICAL HISTORY:  HTN (hypertension)  No significant past surgical history       REVIEW OF SYMPTOMS  [X] Constitutional WNL         [X] Resp WNL           [X] GI WNL                          [X]  WNL                   [X] Heme WNL                 [X] Skin WNL                 [X] MSK WNL                PHYSICAL EXAM  Constitutional - NAD  HEENT - NCAT, EOMI  Neck - Supple, No limited ROM  Chest - CTA bilaterally, No wheeze, No rhonchi, No crackles  Cardiovascular - RRR, S1S2, No murmurs  Abdomen - BS+, Soft, NTND  Extremities - No C/C/E, No calf tenderness   Skin-no rash  Wounds-none      Neurologic Exam - Awake, Alert, improving balance/coordination. Right drift.      FUNCTIONAL PROGRESS  Gait - 120ft RW CG-min A  ADLs - CG  Transfers -CG  Functional transfer - CG    VITALS  Vital Signs Last 24 Hrs  T(C): 36.6 (10 Dec 2019 07:26), Max: 36.8 (09 Dec 2019 19:51)  T(F): 97.8 (10 Dec 2019 07:26), Max: 98.3 (09 Dec 2019 19:51)  HR: 83 (10 Dec 2019 07:26) (74 - 83)  BP: 123/86 (10 Dec 2019 07:26) (117/77 - 123/86)  BP(mean): --  RR: 16 (10 Dec 2019 07:26) (16 - 16)  SpO2: 96% (10 Dec 2019 07:26) (96% - 96%)       RECENT LABS                        16.9   7.34  )-----------( 256      ( 09 Dec 2019 05:35 )             49.5     12-09    139  |  102  |  28<H>  ----------------------------<  113<H>  3.8   |  26  |  1.40<H>    Ca    9.3      09 Dec 2019 05:35    TPro  8.0  /  Alb  3.1<L>  /  TBili  0.5  /  DBili  x   /  AST  25  /  ALT  35  /  AlkPhos  82  12-09      LIVER FUNCTIONS - ( 09 Dec 2019 05:35 )  Alb: 3.1 g/dL / Pro: 8.0 g/dL / ALK PHOS: 82 U/L / ALT: 35 U/L / AST: 25 U/L / GGT: x             Direct LDL: 239 mg/dL (11-15-19 @ 08:31)    Hemoglobin A1C, Whole Blood: 9.5 % (11-15-19 @ 08:34)              RADIOLOGY/OTHER RESULTS      CURRENT MEDICATIONS  MEDICATIONS  (STANDING):  amLODIPine   Tablet 10 milliGRAM(s) Oral at bedtime  aspirin  chewable 81 milliGRAM(s) Oral daily  atorvastatin 80 milliGRAM(s) Oral at bedtime  dextrose 5%. 1000 milliLiter(s) (50 mL/Hr) IV Continuous <Continuous>  dextrose 50% Injectable 12.5 Gram(s) IV Push once  dextrose 50% Injectable 25 Gram(s) IV Push once  dextrose 50% Injectable 25 Gram(s) IV Push once  enoxaparin Injectable 40 milliGRAM(s) SubCutaneous daily  insulin glargine Injectable (LANTUS) 26 Unit(s) SubCutaneous at bedtime  insulin lispro (HumaLOG) corrective regimen sliding scale   SubCutaneous three times a day before meals  insulin lispro (HumaLOG) corrective regimen sliding scale   SubCutaneous at bedtime  pantoprazole    Tablet 40 milliGRAM(s) Oral before breakfast    MEDICATIONS  (PRN):  dextrose 40% Gel 15 Gram(s) Oral once PRN Blood Glucose LESS THAN 70 milliGRAM(s)/deciliter  glucagon  Injectable 1 milliGRAM(s) IntraMuscular once PRN Glucose LESS THAN 70 milligrams/deciliter  ondansetron    Tablet 4 milliGRAM(s) Oral two times a day PRN Nausea and/or Vomiting      ASSESSMENT & PLAN        GI/Bowel Management - prn   Management - Toilet Q2  Skin - Turn Q2  Pain - Tylenol PRN  DVT PPX - Lovenox sq  Diet - consistent carbs.     Continue comprehensive acute rehab program consisting of 3hrs/day of OT/PT and SLP.

## 2019-12-10 NOTE — DISCHARGE NOTE NURSING/CASE MANAGEMENT/SOCIAL WORK - PATIENT PORTAL LINK FT
You can access the FollowMyHealth Patient Portal offered by [facility] by registering at the following website: [email or web address]. By joining [facility] [employer]’s FollowMyHealth portal, you will also be able to view your health information using other applications (apps) compatible with our system.

## 2019-12-10 NOTE — PROGRESS NOTE ADULT - ATTENDING COMMENTS
No new complaints, stable neurological  exam.  No acute events overnight  Full program  Discharge plan is in progress
Patient medically ane neurologically  stable. Making progress towards rehab goals.   Continue therapy
Patient medically ane neurologically  stable. Making progress towards rehab goals.   Nephrology evaluated patient without changes to management.  Continue therapy
Adjusting well , no acute issues overnight  Stable exam  Admit labs reviewed  Full program
No acute events overnight  Stable neurological exam  Increase oral fluids and monitor renal function  Discharge plan is in progress.
No complaints offered  Stable neurologically and medically  Full program  Labs in the morning
No new complaints  Stable and improving exam  Full program  Labs in the morning   Discharge plan discussed with team
No new complaints, stable exam  Multidisciplinary team meeting today:  patient's functional goals and needs, functional and clinical  progress were discussed, barriers to discharge were identified. Anticipate  [facility] facility placement.   ELOS 7 days
No new complaints, stable neurological  exam.  Full program  Discharge plan is in progress
Patient medically ane neurologically  stable. Making progress towards rehab goals.   Continue rehab program.
No new complaints  Tolerates therapy well  Stable neurological exam  Nephrology input appreciated  Full program
Patient is being discharged to [facility] today.  Discharge instructions were discussed with patient , all current medications were reviewed. Patient and family were educated on importance of medication compliance,  continued  care with PMD and follow-up care with the specialists in the community. Safety and fall risk precautions  were discussed in detail, counseled on healthy life style modifications.  All questions were answered to their satisfaction.
Patient medically and neurologically stable. Making progress towards rehab goals.     Multidisciplinary team meeting today:  patient's functional goals and needs, functional and clinical  progress were discussed, barriers to discharge were identified. Anticipate [facility] facility placement.   ELOS 2 weeks

## 2019-12-10 NOTE — PROGRESS NOTE ADULT - REASON FOR ADMISSION
s/p right cerebellar CVA with deficits

## 2019-12-10 NOTE — PROGRESS NOTE ADULT - ASSESSMENT
54M with PMH of HTN presented with dizziness/unsteady gait Found to have subacute infarction and evolving infarction in right brachium pontis is admitted here for rehab program.    #Right cerebellar CVA (Subacute/acute evolving infarction)  Cont with comprehensive rehab program  cont with aspirin/statin  Meclizine prn for dizziness    #HTN  Blood pressure meds with hold parameters     #Newly diagnosed Diabetes type 2  11-15 [id number] 9.5  adjust insulin dose daily as appropriate based on glucose levels.   Would benefit from mealtime insulin dosing in addition to SSI; primary team prefers simplified regimen given poor social support and compliance concerns with insulin.  Outpatient PO options: Metformin (as long as creatinine 1.5 or below), januvia, glipizide.    Mild right ear hearing loss since CVA  -outpatient ENT f/up and audiology testing  -monitor clinically inpatient    CKD III: avoid nephrotoxics. monitor intake and output.     #DVT PPX: s/c lovenox    d/w rehab team

## 2020-02-12 NOTE — DISCHARGE NOTE PROVIDER - CARE PROVIDER_API CALL
Teddy Gant)  Cardiology  [address], [address]  [place] [address]  Phone: [phone]  Fax: [phone]  Follow Up Time:     Caron Pang)  Neurology  [address] [place] [address]  Phone: [phone]  Fax: [phone]  Follow Up Time: clear

## 2020-10-23 NOTE — PROGRESS NOTE ADULT - SUBJECTIVE AND OBJECTIVE BOX
neuro cons dict.  presented with dizziness for 4 day;  likely peripheral; r/o cva  cta of neck reported V4 OCCLUSION OF RIGHT VA.  BRAIN MRI.  MRA OF NECK W.  ANTIVERT 25 MG TIS,  CONTINUE AP/STATIN PERIPHERAL EDEMA

## 2020-12-01 NOTE — ED PROVIDER NOTE - CADM POA CENTRAL LINE
[facility] Medical Fitness Daily Note    Visit Number: 21  Initial consultation date:6/25/2018  Diagnosis: obesity  Prescription expiration date: 07/1/2020  Last monthly update completed 11/3/2020  Referred by: Dr. Silke Faith NP  Precautions:  None    Subjective   Clients current reports: feeling well today  Concerns from last session: none  Update on pain/injury: no pain    Therapeutic Exercise     Step-Ups (bar) 2-3 x 12-15 x ____15____  Lat Row (DB) 2-3 x 12 x ___12_____  Bicep Curl: incline (DB) 2-3 x 12 x ___8_____  Bicycle Crunch 2-3 x 20/20  Leg Curls (cable) 2-3 x 12-15    Bicep Curls: neutral (bar) 2-3 x 12 x ____15____  High Plank 2-3 x 30-60 seconds  :30      Comments: felt good today; warmed up on cybex for 15 minutes prior to strength training.  She had good form with each exercise.  Struggled with breathing during the step up drill (due to masking and being deconditioned.)    Plan for next session     Total body strength    Billing:    Session total time 30, number of units 1   No

## 2021-08-26 NOTE — H&P ADULT - NSHPPOAURINARYCATHETER_GEN_ALL_CORE
Pt crying wanting to take stickers from EKG monitor off, taken off by writer to make pt more comfortable. Pt became more consolable after. Will continue POC and monitor vitals frequently    no

## 2022-01-14 NOTE — PROGRESS NOTE ADULT - SUBJECTIVE AND OBJECTIVE BOX
He should still have a months worth left.  Follow-up with Mine.   Neurology Follow up note    ERASTO [id number]    HPI:  54M with PMH of HTN presents with dizziness for 4 days. States he feels like the room is spinning and has an unsteady gait. Reports intermittent dizziness for a 2 days but progressed to constant dizziness, he was unable to stand without holding unto objects near by. Patient walks to the [facility] for work daily and couldn't walk without reaching and leaning on trees. States similar brief episode of dizziness last week but was able to go to work and symptoms resolved after a minute. Patient works as a  in [employer], skins and cuts fish, also does heavy loading. States diet consists of "eat on the go take out," including 7/11, bagels, soda, ice tea, deli food and chinese food. Admits to two episodes of non-bloody, non-bilious emesis and was unable to keep food down today, prompting urgent care evaluation. Patient was given Meclizine at the urgent care and was advised ED eval if symptoms persisted. Denies chest pain, palpitations, SOB, FRENCH, abd pain, c/d. Denies ear pain, sore throat. States his form of exercise is walking, as patient does not have a car.     In the ED: Temp 98.1, HR 88, /102, RR 16, SpO2 100% RA.   H/H: 18.4/51.6, glucose 267.   CT head: No acute hemorrhage or mass effect.  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  NIH Stroke Scale: 0, EKG: NSR 94  Received ASA 325mg, Augmentin x1, 1L NS bolus, Valium 5mg x1, Meclizine 25mg x1, Solu-Medrol 125mg IV x1. (14 Nov 2019 22:53)      Interval History - mild HA.    Patient is seen, chart was reviewed and case was discussed with the treatment team.  Pt is not in any distress.   Lying on bed comfortably.   No events reported overnight.   No clinical seizure was reported.  Sitting on chair bed comfortably.    is at bedside.    Vital Signs Last 24 Hrs  T(C): 36.9 (20 Nov 2019 08:14), Max: 37.1 (19 Nov 2019 19:58)  T(F): 98.4 (20 Nov 2019 08:14), Max: 98.7 (19 Nov 2019 19:58)  HR: 86 (20 Nov 2019 08:14) (72 - 86)  BP: 134/95 (20 Nov 2019 08:14) (134/92 - 156/90)  BP(mean): --  RR: 22 (20 Nov 2019 08:14) (16 - 22)  SpO2: 94% (20 Nov 2019 08:14) (94% - 97%)        REVIEW OF SYSTEMS:    Constitutional: No fever, weight loss or fatigue  Eyes: No eye pain, visual disturbances, or discharge  ENT:  No sinus or throat pain  Neck: No pain or stiffness  Respiratory: No cough, wheezing, chills or hemoptysis  Cardiovascular: No chest pain, palpitations,   Gastrointestinal: No abdominal or epigastric pain.   Genitourinary: No dysuria, frequency, hematuria or incontinence  Neurological: No headaches, memory loss,   Psychiatric: No depression,   Musculoskeletal: No joint pain or swelling;   Skin: No itching, burning, rashes or lesions   Lymph Nodes: No enlarged glands  Endocrine: No heat or cold intolerance;      On Neurological Examination:    Mental Status - Pt is alert, awake, oriented X3.   Follows commands well and able to answer questions appropriately.  Mood and affect  normal    Speech - dysarthria.    Cranial Nerves - Pupils 3 mm equal and reactive to light, upbeating nystagmus ,more pronounced in left lateral gaze ?ophthalmoplegia,  Pt has no  facial asymmetry. Facial sensation is diminished to LT/PP on right.  Tongue - is in midline.    Muscle tone - is normal all over. Moves all extremities equally. No asymmetry is seen.      Motor Exam - 5/5 all over, No drift.     Sensory Exam - Pin prick, temperature, joint position and vibration are intact on either side. Pt withdraws all extremities equally on stimulation. No asymmetry seen.     Gait - severe right hemiataxia; falling to right despite  using walker,      coordination:    Finger to nose: dysmetria/past pointing on right.    Heel to shin:  right sided dysmetria.    Deep tendon Reflexes - 2 plus all over.    Neck Supple -  Yes.     MEDICATIONS    amoxicillin  875 milliGRAM(s)/clavulanate 1 Tablet(s) Oral two times a day  artificial tears (preservative free) Ophthalmic Solution 1 Drop(s) Left EYE three times a day  aspirin 325 milliGRAM(s) Oral daily  atorvastatin 80 milliGRAM(s) Oral at bedtime  carbamide peroxide Otic Solution 1 Drop(s) Both Ears two times a day  dextrose 40% Gel 15 Gram(s) Oral once PRN  dextrose 5%. 1000 milliLiter(s) IV Continuous <Continuous>  dextrose 50% Injectable 12.5 Gram(s) IV Push once  dextrose 50% Injectable 25 Gram(s) IV Push once  dextrose 50% Injectable 25 Gram(s) IV Push once  enoxaparin Injectable 40 milliGRAM(s) SubCutaneous daily  glucagon  Injectable 1 milliGRAM(s) IntraMuscular once PRN  insulin lispro (HumaLOG) corrective regimen sliding scale   SubCutaneous three times a day before meals  insulin lispro (HumaLOG) corrective regimen sliding scale   SubCutaneous at bedtime  insulin lispro Injectable (HumaLOG) 4 Unit(s) SubCutaneous three times a day with meals  meclizine 37.5 milliGRAM(s) Oral every 8 hours  sodium chloride 0.9%. 1000 milliLiter(s) IV Continuous <Continuous>  sodium chloride 0.9%. 1000 milliLiter(s) IV Continuous <Continuous>      Allergies    No Known Allergies    Intolerances        LABS:  CBC Full  -  ( 20 Nov 2019 06:33 )  WBC Count : 12.23 K/uL  RBC Count : 5.34 M/uL  Hemoglobin : 17.4 g/dL  Hematocrit : 49.5 %  Platelet Count - Automated : 212 K/uL  Mean Cell Volume : 92.7 fl  Mean Cell Hemoglobin : 32.6 pg  Mean Cell Hemoglobin Concentration : 35.2 gm/dL  Auto Neutrophil # : 9.80 K/uL        11-20    140  |  110<H>  |  30<H>  ----------------------------<  158<H>  3.6   |  21<L>  |  1.20    Ca    7.7<L>      20 Nov 2019 06:33      Hemoglobin A1C:     Vitamin B12     RADIOLOGY  < from: MR Head No Cont (11.18.19 @ 13:07) >    EXAM:  MR BRAIN                            PROCEDURE DATE:  11/18/2019          INTERPRETATION:  CLINICAL INDICATION: Dizziness, unsteady gait. Evaluate   for CVA.    TECHNIQUE: Multi-planar multi-sequential MR imaging of the brain was   performed without intravenous contrast.    COMPARISON: Brain MRI, MR angiography head and neck 11/15/2019.    FINDINGS:    There is hyperintensity on diffusion-weighted sequence with associated   signal dropout on corresponding ADC maps in the right brachium pontis   (series 4, images 8-10), findings compatible with evolving acute infarct.   There is no associated hemorrhage.    There are scattered T2/FLAIR hyperintense changes in the periventricular   and subcortical white matter which are nonspecific finding, but most   likely represent sequelae of mild to moderate chronic microvascular   ischemic disease. There is mild diffuse cortical sulcal prominence   related to underlying brain parenchymal volume loss.    There is no intracranial mass. The ventricles are normal without evidence   of hydrocephalus. There are no extra-axial fluid collections. The skull   base flow voids are present.  There is an enlarged, partially empty sella turcica.      The visualized intraorbital contents are normal. There are mild mucosal   inflammatory changes of the ethmoid air cells. The mastoid air cells are   clear. The visualized soft tissues and osseous structures appear   unremarkable.    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 Noassociated   hemorrhage.    These findings were discussed with Dr. Pang of the patient's clinical   team on 11/18/2019 at approximately 2:00 PM.        ARPAN SALDANA M.D., ATTENDING RADIOLOGIST  This document has been electronically signed. Nov 18 2019  2:11PM             ASSESSMENT AND PLAN:      sub acute right brachium pontis infarct; presented with vertigo ,ataxia, decreased hearing.  worsening of right sided ataxia ; stable over last 24 hr,  c/o mild HA    CONTINUE IVF  ns bolus 500 ml X one  seen by ICU yesterday.  would repeat ct head today,  Hopefully transfer to rehab tomorrow.  dw house staff and dr Abel.  Physical therapy evaluation.  OOB to chair/ambulation with assistance only.  Pain is accessed and addressed.  Would continue to follow.

## 2022-03-09 NOTE — PROGRESS NOTE ADULT - PROBLEM SELECTOR PLAN 1
Patient with persistent dizziness for 4days- Antivert   -ruled out cva  and tia .   -IVF started 75cc/hr x 12 hours. H/H mildly elevated likely due to hemoconcentration as patient reports vomiting and minimal intake today  -Start ASA 325mg daily  -MRA head and neck shows vertebral artery calcifications and near complete stenosis  -MRI showing no acute disease, near complete occlusion likely congenital or chronic  -Hemoglobin A1C- 9.5  -TSH- 0.64  -Cholesterol- 310, LDL- 239, HDL- 49  -Speech and swallow evaluation- recommended regular solids with thin liquids Detail Level: Zone Benzoyl Peroxide Pregnancy And Lactation Text: This medication is Pregnancy Category C. It is unknown if benzoyl peroxide is excreted in breast milk. Winlevi Counseling:  I discussed with the patient the risks of topical clascoterone including but not limited to erythema, scaling, itching, and stinging. Patient voiced their understanding. Minocycline Counseling: Patient advised regarding possible photosensitivity and discoloration of the teeth, skin, lips, tongue and gums.  Patient instructed to avoid sunlight, if possible.  When exposed to sunlight, patients should wear protective clothing, sunglasses, and sunscreen.  The patient was instructed to call the office immediately if the following severe adverse effects occur:  hearing changes, easy bruising/bleeding, severe headache, or vision changes.  The patient verbalized understanding of the proper use and possible adverse effects of minocycline.  All of the patient's questions and concerns were addressed. Topical Clindamycin Counseling: Patient counseled that this medication may cause skin irritation or allergic reactions.  In the event of skin irritation, the patient was advised to reduce the amount of the drug applied or use it less frequently.   The patient verbalized understanding of the proper use and possible adverse effects of clindamycin.  All of the patient's questions and concerns were addressed. Spironolactone Counseling: Patient advised regarding risks of diarrhea, abdominal pain, hyperkalemia, birth defects (for female patients), liver toxicity and renal toxicity. The patient may need blood work to monitor liver and kidney function and potassium levels while on therapy. The patient verbalized understanding of the proper use and possible adverse effects of spironolactone.  All of the patient's questions and concerns were addressed. Tazorac Pregnancy And Lactation Text: This medication is not safe during pregnancy. It is unknown if this medication is excreted in breast milk. Bactrim Counseling:  I discussed with the patient the risks of sulfa antibiotics including but not limited to GI upset, allergic reaction, drug rash, diarrhea, dizziness, photosensitivity, and yeast infections.  Rarely, more serious reactions can occur including but not limited to aplastic anemia, agranulocytosis, methemoglobinemia, blood dyscrasias, liver or kidney failure, lung infiltrates or desquamative/blistering drug rashes. Erythromycin Pregnancy And Lactation Text: This medication is Pregnancy Category B and is considered safe during pregnancy. It is also excreted in breast milk. Topical Clindamycin Pregnancy And Lactation Text: This medication is Pregnancy Category B and is considered safe during pregnancy. It is unknown if it is excreted in breast milk. Minocycline Pregnancy And Lactation Text: This medication is Pregnancy Category D and not consider safe during pregnancy. It is also excreted in breast milk. Topical Retinoid counseling:  Patient advised to apply a pea-sized amount only at bedtime and wait 30 minutes after washing their face before applying.  If too drying, patient may add a non-comedogenic moisturizer. The patient verbalized understanding of the proper use and possible adverse effects of retinoids.  All of the patient's questions and concerns were addressed. Doxycycline Counseling:  Patient counseled regarding possible photosensitivity and increased risk for sunburn.  Patient instructed to avoid sunlight, if possible.  When exposed to sunlight, patients should wear protective clothing, sunglasses, and sunscreen.  The patient was instructed to call the office immediately if the following severe adverse effects occur:  hearing changes, easy bruising/bleeding, severe headache, or vision changes.  The patient verbalized understanding of the proper use and possible adverse effects of doxycycline.  All of the patient's questions and concerns were addressed. Tetracycline Counseling: Patient counseled regarding possible photosensitivity and increased risk for sunburn.  Patient instructed to avoid sunlight, if possible.  When exposed to sunlight, patients should wear protective clothing, sunglasses, and sunscreen.  The patient was instructed to call the office immediately if the following severe adverse effects occur:  hearing changes, easy bruising/bleeding, severe headache, or vision changes.  The patient verbalized understanding of the proper use and possible adverse effects of tetracycline.  All of the patient's questions and concerns were addressed. Patient understands to avoid pregnancy while on therapy due to potential birth defects. Isotretinoin Counseling: Patient should get monthly blood tests, not donate blood, not drive at night if vision affected, not share medication, and not undergo elective surgery for 6 months after tx completed. Side effects reviewed, pt to contact office should one occur. Dapsone Pregnancy And Lactation Text: This medication is Pregnancy Category C and is not considered safe during pregnancy or breast feeding. Spironolactone Pregnancy And Lactation Text: This medication can cause feminization of the male fetus and should be avoided during pregnancy. The active metabolite is also found in breast milk. Winlevi Pregnancy And Lactation Text: This medication is considered safe during pregnancy and breastfeeding. Bactrim Pregnancy And Lactation Text: This medication is Pregnancy Category D and is known to cause fetal risk.  It is also excreted in breast milk. Azithromycin Counseling:  I discussed with the patient the risks of azithromycin including but not limited to GI upset, allergic reaction, drug rash, diarrhea, and yeast infections. Doxycycline Pregnancy And Lactation Text: This medication is Pregnancy Category D and not consider safe during pregnancy. It is also excreted in breast milk but is considered safe for shorter treatment courses. Sarecycline Counseling: Patient advised regarding possible photosensitivity and discoloration of the teeth, skin, lips, tongue and gums.  Patient instructed to avoid sunlight, if possible.  When exposed to sunlight, patients should wear protective clothing, sunglasses, and sunscreen.  The patient was instructed to call the office immediately if the following severe adverse effects occur:  hearing changes, easy bruising/bleeding, severe headache, or vision changes.  The patient verbalized understanding of the proper use and possible adverse effects of sarecycline.  All of the patient's questions and concerns were addressed. Topical Sulfur Applications Counseling: Topical Sulfur Counseling: Patient counseled that this medication may cause skin irritation or allergic reactions.  In the event of skin irritation, the patient was advised to reduce the amount of the drug applied or use it less frequently.   The patient verbalized understanding of the proper use and possible adverse effects of topical sulfur application.  All of the patient's questions and concerns were addressed. Birth Control Pills Pregnancy And Lactation Text: This medication should be avoided if pregnant and for the first 30 days post-partum. High Dose Vitamin A Counseling: Side effects reviewed, pt to contact office should one occur. Birth Control Pills Counseling: Birth Control Pill Counseling: I discussed with the patient the potential side effects of OCPs including but not limited to increased risk of stroke, heart attack, thrombophlebitis, deep venous thrombosis, hepatic adenomas, breast changes, GI upset, headaches, and depression.  The patient verbalized understanding of the proper use and possible adverse effects of OCPs. All of the patient's questions and concerns were addressed. Isotretinoin Pregnancy And Lactation Text: This medication is Pregnancy Category X and is considered extremely dangerous during pregnancy. It is unknown if it is excreted in breast milk. Topical Retinoid Pregnancy And Lactation Text: This medication is Pregnancy Category C. It is unknown if this medication is excreted in breast milk. High Dose Vitamin A Pregnancy And Lactation Text: High dose vitamin A therapy is contraindicated during pregnancy and breast feeding. Include Pregnancy/Lactation Warning?: No Azithromycin Pregnancy And Lactation Text: This medication is considered safe during pregnancy and is also secreted in breast milk. Erythromycin Counseling:  I discussed with the patient the risks of erythromycin including but not limited to GI upset, allergic reaction, drug rash, diarrhea, increase in liver enzymes, and yeast infections. Benzoyl Peroxide Counseling: Patient counseled that medicine may cause skin irritation and bleach clothing.  In the event of skin irritation, the patient was advised to reduce the amount of the drug applied or use it less frequently.   The patient verbalized understanding of the proper use and possible adverse effects of benzoyl peroxide.  All of the patient's questions and concerns were addressed. Dapsone Counseling: I discussed with the patient the risks of dapsone including but not limited to hemolytic anemia, agranulocytosis, rashes, methemoglobinemia, kidney failure, peripheral neuropathy, headaches, GI upset, and liver toxicity.  Patients who start dapsone require monitoring including baseline LFTs and weekly CBCs for the first month, then every month thereafter.  The patient verbalized understanding of the proper use and possible adverse effects of dapsone.  All of the patient's questions and concerns were addressed. Tazorac Counseling:  Patient advised that medication is irritating and drying.  Patient may need to apply sparingly and wash off after an hour before eventually leaving it on overnight.  The patient verbalized understanding of the proper use and possible adverse effects of tazorac.  All of the patient's questions and concerns were addressed. Topical Sulfur Applications Pregnancy And Lactation Text: This medication is Pregnancy Category C and has an unknown safety profile during pregnancy. It is unknown if this topical medication is excreted in breast milk. Azelaic Acid Pregnancy And Lactation Text: This medication is considered safe during pregnancy and breast feeding. Azelaic Acid Counseling: Patient counseled that medicine may cause skin irritation and to avoid applying near the eyes.  In the event of skin irritation, the patient was advised to reduce the amount of the drug applied or use it less frequently.   The patient verbalized understanding of the proper use and possible adverse effects of azelaic acid.  All of the patient's questions and concerns were addressed.

## 2023-10-19 NOTE — PROGRESS NOTE ADULT - PROVIDER SPECIALTY LIST ADULT
Neurology Patient called and stated that he called up to the hospital and he wanted to get a EKG done but they told him that he needs to get a order from who he saw for this problem. So he would just like a order put in for a EKG so he can get it done.

## 2024-10-08 NOTE — CONSULT NOTE ADULT - CONSULT REQUESTED DATE/TIME
1) Increase fluids and rest  2) Try Flonase nasal spray. You can additionally use saline nasal spray.   3) Take Tylenol as needed for pain relief.   4) Follow up with ENT.   5) I will call with your strep test results.      19-Nov-2019 17:18

## 2024-11-11 NOTE — DISCHARGE NOTE PROVIDER - PROVIDER RX CONTACT NUMBER
Spoke to patient and apologized for any confusion.  Advised patient to keep 11/20 but can cancel 11/15 lab and rebook on 12/13.    Patient agreeable, no further questions at this time. Lab rescheduled for 12/13/24.   [phone]

## 2025-05-21 NOTE — DIETITIAN INITIAL EVALUATION ADULT. - +GENDER
Problem: Respiratory - Adult  Goal: Clear lung sounds  5/21/2025 0831 by Avelina Agosto RCP  Outcome: Progressing      Statement Selected